# Patient Record
Sex: MALE | Race: WHITE | NOT HISPANIC OR LATINO | URBAN - METROPOLITAN AREA
[De-identification: names, ages, dates, MRNs, and addresses within clinical notes are randomized per-mention and may not be internally consistent; named-entity substitution may affect disease eponyms.]

---

## 2017-10-17 ENCOUNTER — OUTPATIENT (OUTPATIENT)
Dept: OUTPATIENT SERVICES | Facility: HOSPITAL | Age: 57
LOS: 1 days | Discharge: HOME | End: 2017-10-17

## 2017-10-17 DIAGNOSIS — R56.9 UNSPECIFIED CONVULSIONS: ICD-10-CM

## 2017-10-17 DIAGNOSIS — Z00.8 ENCOUNTER FOR OTHER GENERAL EXAMINATION: ICD-10-CM

## 2017-10-17 DIAGNOSIS — R40.4 TRANSIENT ALTERATION OF AWARENESS: ICD-10-CM

## 2017-10-18 ENCOUNTER — OUTPATIENT (OUTPATIENT)
Dept: OUTPATIENT SERVICES | Facility: HOSPITAL | Age: 57
LOS: 1 days | Discharge: HOME | End: 2017-10-18

## 2017-10-18 DIAGNOSIS — R40.4 TRANSIENT ALTERATION OF AWARENESS: ICD-10-CM

## 2017-10-18 DIAGNOSIS — R56.9 UNSPECIFIED CONVULSIONS: ICD-10-CM

## 2017-10-18 DIAGNOSIS — I49.9 CARDIAC ARRHYTHMIA, UNSPECIFIED: ICD-10-CM

## 2017-12-29 ENCOUNTER — OUTPATIENT (OUTPATIENT)
Dept: OUTPATIENT SERVICES | Facility: HOSPITAL | Age: 57
LOS: 1 days | Discharge: HOME | End: 2017-12-29

## 2017-12-29 DIAGNOSIS — S06.890S OTHER SPECIFIED INTRACRANIAL INJURY WITHOUT LOSS OF CONSCIOUSNESS, SEQUELA: ICD-10-CM

## 2018-01-30 DIAGNOSIS — R40.4 TRANSIENT ALTERATION OF AWARENESS: ICD-10-CM

## 2018-01-30 DIAGNOSIS — R56.9 UNSPECIFIED CONVULSIONS: ICD-10-CM

## 2018-07-05 ENCOUNTER — EMERGENCY (EMERGENCY)
Facility: HOSPITAL | Age: 58
LOS: 0 days | Discharge: LEFT BEFORE DISCHARGE | End: 2018-07-05
Admitting: EMERGENCY MEDICINE

## 2018-07-05 VITALS
DIASTOLIC BLOOD PRESSURE: 70 MMHG | TEMPERATURE: 98 F | SYSTOLIC BLOOD PRESSURE: 125 MMHG | RESPIRATION RATE: 20 BRPM | HEART RATE: 60 BPM | OXYGEN SATURATION: 99 %

## 2018-07-05 DIAGNOSIS — R41.0 DISORIENTATION, UNSPECIFIED: ICD-10-CM

## 2018-07-05 DIAGNOSIS — F91.8 OTHER CONDUCT DISORDERS: ICD-10-CM

## 2018-07-05 NOTE — ED ADULT TRIAGE NOTE - CHIEF COMPLAINT QUOTE
patient mechanical fall today while visiting mother as per witnesses no loc  patient confused at base line accompanied by pd. patient not under arrest aggressive on scene

## 2018-07-05 NOTE — ED ADULT NURSE NOTE - OBJECTIVE STATEMENT
pt presents to ED with c/o fall at mothers NH, no LOC. Pt awake and alert, ambulating with steady gait. Denies any pain or distress.

## 2018-07-05 NOTE — ED ADULT NURSE REASSESSMENT NOTE - NS ED NURSE REASSESS COMMENT FT1
Pt transported to CT at this time          Joleen Moctezuma, SHARATH  10/07/17 1600 pt alert and orientated x3, ambulating with steady gait. GCS 15. Pt not in assigned area, charge RN aware.

## 2018-07-05 NOTE — ED PROVIDER NOTE - PROGRESS NOTE DETAILS
searched for pt multiple times in ED for >1 hr. pt still not found in ED. pt not evaluated by me or attending. will continue to search. RN also does not know where pt is. called pts telephone without answer. left message.

## 2018-10-19 ENCOUNTER — OUTPATIENT (OUTPATIENT)
Dept: OUTPATIENT SERVICES | Facility: HOSPITAL | Age: 58
LOS: 1 days | Discharge: HOME | End: 2018-10-19

## 2018-10-19 DIAGNOSIS — Z00.8 ENCOUNTER FOR OTHER GENERAL EXAMINATION: ICD-10-CM

## 2018-10-19 LAB
ALBUMIN SERPL ELPH-MCNC: 4.6 G/DL — SIGNIFICANT CHANGE UP (ref 3.5–5.2)
ALP SERPL-CCNC: 67 U/L — SIGNIFICANT CHANGE UP (ref 30–115)
ALT FLD-CCNC: 34 U/L — SIGNIFICANT CHANGE UP (ref 0–41)
ANION GAP SERPL CALC-SCNC: 15 MMOL/L — HIGH (ref 7–14)
APPEARANCE UR: CLEAR — SIGNIFICANT CHANGE UP
AST SERPL-CCNC: 23 U/L — SIGNIFICANT CHANGE UP (ref 0–41)
BILIRUB DIRECT SERPL-MCNC: <0.2 MG/DL — SIGNIFICANT CHANGE UP (ref 0–0.2)
BILIRUB INDIRECT FLD-MCNC: >0.1 MG/DL — LOW (ref 0.2–1.2)
BILIRUB SERPL-MCNC: 0.3 MG/DL — SIGNIFICANT CHANGE UP (ref 0.2–1.2)
BILIRUB UR-MCNC: NEGATIVE — SIGNIFICANT CHANGE UP
BUN SERPL-MCNC: 13 MG/DL — SIGNIFICANT CHANGE UP (ref 10–20)
CALCIUM SERPL-MCNC: 9.4 MG/DL — SIGNIFICANT CHANGE UP (ref 8.5–10.1)
CHLORIDE SERPL-SCNC: 102 MMOL/L — SIGNIFICANT CHANGE UP (ref 98–110)
CHOLEST SERPL-MCNC: 268 MG/DL — HIGH (ref 100–200)
CO2 SERPL-SCNC: 25 MMOL/L — SIGNIFICANT CHANGE UP (ref 17–32)
COLOR SPEC: YELLOW — SIGNIFICANT CHANGE UP
CREAT SERPL-MCNC: 0.7 MG/DL — SIGNIFICANT CHANGE UP (ref 0.7–1.5)
DIFF PNL FLD: NEGATIVE — SIGNIFICANT CHANGE UP
ESTIMATED AVERAGE GLUCOSE: 148 MG/DL — HIGH (ref 68–114)
GLUCOSE SERPL-MCNC: 150 MG/DL — HIGH (ref 70–99)
GLUCOSE UR QL: NEGATIVE MG/DL — SIGNIFICANT CHANGE UP
HAV IGM SER-ACNC: SIGNIFICANT CHANGE UP
HBA1C BLD-MCNC: 6.8 % — HIGH (ref 4–5.6)
HBV CORE IGM SER-ACNC: SIGNIFICANT CHANGE UP
HBV SURFACE AG SER-ACNC: SIGNIFICANT CHANGE UP
HCT VFR BLD CALC: 41.7 % — LOW (ref 42–52)
HCV AB S/CO SERPL IA: 13.11 S/CO — SIGNIFICANT CHANGE UP
HCV AB SERPL-IMP: REACTIVE
HDLC SERPL-MCNC: 58 MG/DL — SIGNIFICANT CHANGE UP
HGB BLD-MCNC: 13.7 G/DL — LOW (ref 14–18)
KETONES UR-MCNC: NEGATIVE — SIGNIFICANT CHANGE UP
LEUKOCYTE ESTERASE UR-ACNC: NEGATIVE — SIGNIFICANT CHANGE UP
LIPID PNL WITH DIRECT LDL SERPL: 205 MG/DL — HIGH (ref 4–129)
MAGNESIUM SERPL-MCNC: 2 MG/DL — SIGNIFICANT CHANGE UP (ref 1.8–2.4)
MCHC RBC-ENTMCNC: 29.1 PG — SIGNIFICANT CHANGE UP (ref 27–31)
MCHC RBC-ENTMCNC: 32.9 G/DL — SIGNIFICANT CHANGE UP (ref 32–37)
MCV RBC AUTO: 88.5 FL — SIGNIFICANT CHANGE UP (ref 80–94)
NITRITE UR-MCNC: NEGATIVE — SIGNIFICANT CHANGE UP
NRBC # BLD: 0 /100 WBCS — SIGNIFICANT CHANGE UP (ref 0–0)
PH UR: 6 — SIGNIFICANT CHANGE UP (ref 5–8)
PLATELET # BLD AUTO: 155 K/UL — SIGNIFICANT CHANGE UP (ref 130–400)
POTASSIUM SERPL-MCNC: 4.3 MMOL/L — SIGNIFICANT CHANGE UP (ref 3.5–5)
POTASSIUM SERPL-SCNC: 4.3 MMOL/L — SIGNIFICANT CHANGE UP (ref 3.5–5)
PROT SERPL-MCNC: 7.4 G/DL — SIGNIFICANT CHANGE UP (ref 6–8)
PROT UR-MCNC: NEGATIVE MG/DL — SIGNIFICANT CHANGE UP
RBC # BLD: 4.71 M/UL — SIGNIFICANT CHANGE UP (ref 4.7–6.1)
RBC # FLD: 12.8 % — SIGNIFICANT CHANGE UP (ref 11.5–14.5)
SODIUM SERPL-SCNC: 142 MMOL/L — SIGNIFICANT CHANGE UP (ref 135–146)
SP GR SPEC: 1.01 — SIGNIFICANT CHANGE UP (ref 1.01–1.03)
TOTAL CHOLESTEROL/HDL RATIO MEASUREMENT: 4.6 RATIO — SIGNIFICANT CHANGE UP (ref 4–5.5)
TRIGL SERPL-MCNC: 93 MG/DL — SIGNIFICANT CHANGE UP (ref 10–149)
UROBILINOGEN FLD QL: 0.2 MG/DL — SIGNIFICANT CHANGE UP (ref 0.2–0.2)
WBC # BLD: 4.57 K/UL — LOW (ref 4.8–10.8)
WBC # FLD AUTO: 4.57 K/UL — LOW (ref 4.8–10.8)

## 2018-10-20 LAB
HCV RNA FLD QL NAA+PROBE: SIGNIFICANT CHANGE UP
HCV RNA SPEC QL PROBE+SIG AMP: DETECTED

## 2018-10-23 ENCOUNTER — OUTPATIENT (OUTPATIENT)
Dept: OUTPATIENT SERVICES | Facility: HOSPITAL | Age: 58
LOS: 1 days | Discharge: HOME | End: 2018-10-23

## 2018-10-23 DIAGNOSIS — I49.9 CARDIAC ARRHYTHMIA, UNSPECIFIED: ICD-10-CM

## 2018-10-23 LAB
RPR SERPL-ACNC: SIGNIFICANT CHANGE UP
T PALLIDUM AB TITR SER: POSITIVE
T PALLIDUM IGG SER QL IF: REACTIVE

## 2018-12-28 ENCOUNTER — OUTPATIENT (OUTPATIENT)
Dept: OUTPATIENT SERVICES | Facility: HOSPITAL | Age: 58
LOS: 1 days | Discharge: HOME | End: 2018-12-28

## 2018-12-28 DIAGNOSIS — S06.890S OTHER SPECIFIED INTRACRANIAL INJURY WITHOUT LOSS OF CONSCIOUSNESS, SEQUELA: ICD-10-CM

## 2019-03-19 ENCOUNTER — EMERGENCY (EMERGENCY)
Facility: HOSPITAL | Age: 59
LOS: 0 days | Discharge: HOME | End: 2019-03-20
Attending: EMERGENCY MEDICINE | Admitting: EMERGENCY MEDICINE

## 2019-03-19 VITALS — HEART RATE: 57 BPM | OXYGEN SATURATION: 98 % | TEMPERATURE: 100 F | RESPIRATION RATE: 18 BRPM

## 2019-03-19 VITALS
RESPIRATION RATE: 17 BRPM | DIASTOLIC BLOOD PRESSURE: 65 MMHG | OXYGEN SATURATION: 92 % | WEIGHT: 175.05 LBS | HEART RATE: 84 BPM | TEMPERATURE: 100 F | SYSTOLIC BLOOD PRESSURE: 113 MMHG
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
## 2019-03-19 DIAGNOSIS — G40.909 EPILEPSY, UNSPECIFIED, NOT INTRACTABLE, WITHOUT STATUS EPILEPTICUS: ICD-10-CM

## 2019-03-19 DIAGNOSIS — R56.9 UNSPECIFIED CONVULSIONS: ICD-10-CM

## 2019-03-19 DIAGNOSIS — L53.9 ERYTHEMATOUS CONDITION, UNSPECIFIED: ICD-10-CM
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
## 2019-03-19 DIAGNOSIS — R22.41 LOCALIZED SWELLING, MASS AND LUMP, RIGHT LOWER LIMB: ICD-10-CM
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
## 2019-03-19 DIAGNOSIS — F17.200 NICOTINE DEPENDENCE, UNSPECIFIED, UNCOMPLICATED: ICD-10-CM
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
## 2019-03-19 LAB
ALBUMIN SERPL ELPH-MCNC: 4.2 G/DL — SIGNIFICANT CHANGE UP (ref 3.5–5.2)
ALP SERPL-CCNC: 69 U/L — SIGNIFICANT CHANGE UP (ref 30–115)
ALT FLD-CCNC: 34 U/L — SIGNIFICANT CHANGE UP (ref 0–41)
ANION GAP SERPL CALC-SCNC: 10 MMOL/L — SIGNIFICANT CHANGE UP (ref 7–14)
APPEARANCE UR: ABNORMAL
AST SERPL-CCNC: 25 U/L — SIGNIFICANT CHANGE UP (ref 0–41)
BACTERIA # UR AUTO: ABNORMAL /HPF
BASOPHILS # BLD AUTO: 0.01 K/UL — SIGNIFICANT CHANGE UP (ref 0–0.2)
BASOPHILS NFR BLD AUTO: 0.2 % — SIGNIFICANT CHANGE UP (ref 0–1)
BILIRUB SERPL-MCNC: 0.2 MG/DL — SIGNIFICANT CHANGE UP (ref 0.2–1.2)
BILIRUB UR-MCNC: NEGATIVE — SIGNIFICANT CHANGE UP
BUN SERPL-MCNC: 14 MG/DL — SIGNIFICANT CHANGE UP (ref 10–20)
CALCIUM SERPL-MCNC: 8.9 MG/DL — SIGNIFICANT CHANGE UP (ref 8.5–10.1)
CHLORIDE SERPL-SCNC: 97 MMOL/L — LOW (ref 98–110)
CO2 SERPL-SCNC: 27 MMOL/L — SIGNIFICANT CHANGE UP (ref 17–32)
COLOR SPEC: YELLOW — SIGNIFICANT CHANGE UP
CREAT SERPL-MCNC: 0.8 MG/DL — SIGNIFICANT CHANGE UP (ref 0.7–1.5)
DIFF PNL FLD: NEGATIVE — SIGNIFICANT CHANGE UP
EOSINOPHIL # BLD AUTO: 0 K/UL — SIGNIFICANT CHANGE UP (ref 0–0.7)
EOSINOPHIL NFR BLD AUTO: 0 % — SIGNIFICANT CHANGE UP (ref 0–8)
EPI CELLS # UR: ABNORMAL /HPF
GLUCOSE SERPL-MCNC: 228 MG/DL — HIGH (ref 70–99)
GLUCOSE UR QL: 100 MG/DL
HCT VFR BLD CALC: 35.7 % — LOW (ref 42–52)
HGB BLD-MCNC: 11.9 G/DL — LOW (ref 14–18)
IMM GRANULOCYTES NFR BLD AUTO: 0.4 % — HIGH (ref 0.1–0.3)
KETONES UR-MCNC: NEGATIVE — SIGNIFICANT CHANGE UP
LEUKOCYTE ESTERASE UR-ACNC: NEGATIVE — SIGNIFICANT CHANGE UP
LYMPHOCYTES # BLD AUTO: 0.74 K/UL — LOW (ref 1.2–3.4)
LYMPHOCYTES # BLD AUTO: 14.4 % — LOW (ref 20.5–51.1)
MAGNESIUM SERPL-MCNC: 2 MG/DL — SIGNIFICANT CHANGE UP (ref 1.8–2.4)
MCHC RBC-ENTMCNC: 29 PG — SIGNIFICANT CHANGE UP (ref 27–31)
MCHC RBC-ENTMCNC: 33.3 G/DL — SIGNIFICANT CHANGE UP (ref 32–37)
MCV RBC AUTO: 87.1 FL — SIGNIFICANT CHANGE UP (ref 80–94)
MONOCYTES # BLD AUTO: 0.27 K/UL — SIGNIFICANT CHANGE UP (ref 0.1–0.6)
MONOCYTES NFR BLD AUTO: 5.3 % — SIGNIFICANT CHANGE UP (ref 1.7–9.3)
NEUTROPHILS # BLD AUTO: 4.1 K/UL — SIGNIFICANT CHANGE UP (ref 1.4–6.5)
NEUTROPHILS NFR BLD AUTO: 79.7 % — HIGH (ref 42.2–75.2)
NITRITE UR-MCNC: NEGATIVE — SIGNIFICANT CHANGE UP
NRBC # BLD: 0 /100 WBCS — SIGNIFICANT CHANGE UP (ref 0–0)
PH UR: 7 — SIGNIFICANT CHANGE UP (ref 5–8)
PHOSPHATE SERPL-MCNC: 2.4 MG/DL — SIGNIFICANT CHANGE UP (ref 2.1–4.9)
PLATELET # BLD AUTO: 127 K/UL — LOW (ref 130–400)
POTASSIUM SERPL-MCNC: 4.5 MMOL/L — SIGNIFICANT CHANGE UP (ref 3.5–5)
POTASSIUM SERPL-SCNC: 4.5 MMOL/L — SIGNIFICANT CHANGE UP (ref 3.5–5)
PROT SERPL-MCNC: 6.7 G/DL — SIGNIFICANT CHANGE UP (ref 6–8)
PROT UR-MCNC: ABNORMAL MG/DL
RBC # BLD: 4.1 M/UL — LOW (ref 4.7–6.1)
RBC # FLD: SIGNIFICANT CHANGE UP % (ref 11.5–14.5)
SODIUM SERPL-SCNC: 134 MMOL/L — LOW (ref 135–146)
SP GR SPEC: 1.01 — SIGNIFICANT CHANGE UP (ref 1.01–1.03)
TROPONIN T SERPL-MCNC: <0.01 NG/ML — SIGNIFICANT CHANGE UP
UROBILINOGEN FLD QL: 0.2 MG/DL — SIGNIFICANT CHANGE UP (ref 0.2–0.2)
WBC # BLD: 5.14 K/UL — SIGNIFICANT CHANGE UP (ref 4.8–10.8)
WBC # FLD AUTO: 5.14 K/UL — SIGNIFICANT CHANGE UP (ref 4.8–10.8)

## 2019-03-19 RX ORDER — SODIUM CHLORIDE 9 MG/ML
1000 INJECTION INTRAMUSCULAR; INTRAVENOUS; SUBCUTANEOUS ONCE
Qty: 0 | Refills: 0 | Status: COMPLETED | OUTPATIENT
Start: 2019-03-19 | End: 2019-03-19

## 2019-03-19 RX ADMIN — SODIUM CHLORIDE 1000 MILLILITER(S): 9 INJECTION INTRAMUSCULAR; INTRAVENOUS; SUBCUTANEOUS at 21:36

## 2019-03-19 RX ADMIN — SODIUM CHLORIDE 2000 MILLILITER(S): 9 INJECTION INTRAMUSCULAR; INTRAVENOUS; SUBCUTANEOUS at 19:56

## 2019-03-19 NOTE — ED ADULT NURSE NOTE - OBJECTIVE STATEMENT
Pt BIBA c/o x3 episodes of seizure activity at nursing home while visiting mother. As per EMS, 2 witnessed by the mother and 1 by the EMS. Pt administered Versed 10 mg IM in field, pt was agitated when EMS arrived, was refusing to go to the hospital. A&O, hesitant on answering questions. Pt w/ multiple belongings, given to security. Pt noted w/ b/l knee abrasions, no s/s of head trauma. Denies n/v/d, denies chills. Pt calm now in stretcher, denies SI/HI.

## 2019-03-19 NOTE — ED PROVIDER NOTE - NSFOLLOWUPINSTRUCTIONS_ED_ALL_ED_FT
Return to the ER for worsening or concerning symptoms.    See printed discharge information sheets for further instructions on care for your condition.     Seizure, Adult  ImageA seizure is a sudden burst of abnormal electrical activity in the brain. The abnormal activity temporarily interrupts normal brain function, causing a person to experience any of the following:    Involuntary movements.  Changes in awareness or consciousness.  Uncontrollable shaking (convulsions).    Seizures usually last from 30 seconds to 2 minutes. They usually do not cause permanent brain damage unless they are prolonged.    What can cause a seizure to happen?     Seizures can happen for many reasons including:    A fever.  Low blood sugar.  A medicine.  An illnesses.  A brain injury.    Some people who have a seizure never have another one. People who have repeated seizures have a condition called epilepsy.    What are the symptoms of a seizure?     Symptoms of a seizure vary greatly from person to person. They include:    Convulsions.  Stiffening of the body.  Involuntary movements of the arms or legs.  Loss of consciousness.  Breathing problems.  Falling suddenly.  Confusion.  Head nodding.  Eye blinking or fluttering.  Lip smacking.  Drooling.  Rapid eye movements.  Grunting.  Loss of bladder control and bowel control.  Staring.  Unresponsiveness.    Some people have symptoms right before a seizure happens (aura) and right after a seizure happens. Symptoms of an aura include:    Fear or anxiety.  Nausea.  Feeling like the room is spinning (vertigo).  A feeling of having seen or heard something before (vonda vu).  Odd tastes or smells.  Changes in vision, such as seeing flashing lights or spots.    Symptoms that may follow a seizure include:    Confusion.  Sleepiness.  Headache.  Weakness of one side of the body.    Follow these instructions at home:  Medicines     Image   Take over-the-counter and prescription medicines only as told by your health care provider.  Avoid any substances that may prevent your medicine from working properly, such as alcohol.  Activity     Do not drive, swim, or do any other activities that would be dangerous if you had another seizure. Wait until your health care provider approves.  If you live in the [place] check with your local [place]department of [place] to find out about the local driving laws. Each state has specific rules about when you can legally return to driving.  Get enough rest. Lack of sleep can make seizures more likely to occur.  Educating others     Teach friends and family what to do if you have a seizure. They should:    Lay you on the ground to prevent a fall.  Cushion your head and body.  Loosen any tight clothing around your neck.  Turn you on your side. If vomiting occurs, this helps keep your airway clear.  Stay with you until you recover.  Not hold you down. Holding you down will not stop the seizure.  Not put anything in your mouth.  Know whether or not you need emergency care.    General instructions     Contact your health care provider each time you have a seizure.  Avoid anything that has ever triggered a seizure for you.  Keep a seizure diary. Record what you remember about each seizure, especially anything that might have triggered the seizure.  Keep all follow-up visits as told by your health care provider. This is important.  Contact a health care provider if:  You have another seizure.  You have seizures more often.  Your seizure symptoms change.  You continue to have seizures with treatment.  You have symptoms of an infection or illness. They might increase your risk of having a seizure.  Get help right away if:  You have a seizure:    That lasts longer than 5 minutes.  That is different than previous seizures.  That leaves you unable to speak or use a part of your body.  That makes it harder to breathe.  After a head injury.    You have:    Multiple seizures in a row.  Confusion or a severe headache right after a seizure.    You are having seizures more often.  You do not wake up immediately after a seizure.  You injure yourself during a seizure.  These symptoms may represent a serious problem that is an emergency. Do not wait to see if the symptoms will go away. Get medical help right away. Call your local emergency services (911 in the [place] Do not drive yourself to the hospital.     This information is not intended to replace advice given to you by your health care provider. Make sure you discuss any questions you have with your health care provider.    Epilepsy  Epilepsy is a condition in which a person has repeated seizures over time. A seizure is a sudden burst of abnormal electrical and chemical activity in the brain. Seizures can cause a change in attention, behavior, or the ability to remain awake and alert (altered mental status).    Epilepsy increases a person's risk of falls, accidents, and injury. It can also lead to complications, including:    Depression.  Poor memory.  Sudden unexplained death in epilepsy (SUDEP). This complication is rare, and its cause is not known.    Most people with epilepsy lead normal lives.    What are the causes?  This condition may be caused by:    A head injury.  An injury that happens at birth.  A high fever during childhood.  A stroke.  Bleeding that goes into or around the brain.  Certain medicines and drugs.  Having too little oxygen for a long period of time.  Abnormal brain development.  Certain infections, such as meningitis and encephalitis.  Brain tumors.  Conditions that are passed along from parent to child (are hereditary).    What are the signs or symptoms?  Symptoms of a seizure vary greatly from person to person. They include:    Convulsions.  Stiffening of the body.  Involuntary movements of the arms or legs.  Loss of consciousness.  Breathing problems.  Falling suddenly.  Confusion.  Head nodding.  Eye blinking or fluttering.  Lip smacking.  Drooling.  Rapid eye movements.  Grunting.  Loss of bladder control and bowel control.  Staring.  Unresponsiveness.    Some people have symptoms right before a seizure happens (aura) and right after a seizure happens. Symptoms of an aura include:    Fear or anxiety.  Nausea.  Feeling like the room is spinning (vertigo).  A feeling of having seen or heard something before (vonda vu).  Odd tastes or smells.  Changes in vision, such as seeing flashing lights or spots.    Symptoms that follow a seizure include:    Confusion.  Sleepiness.  Headache.    How is this diagnosed?  This condition is diagnosed based on:    Your symptoms.  Your medical history.  A physical exam.  A neurological exam. A neurological exam is similar to a physical exam. It involves checking your strength, reflexes, coordination, and sensations.  Tests, such as:    An electroencephalogram (EEG). This is a painless test that creates a diagram of your brain waves.  An MRI of the brain.  A CT scan of the brain.  A lumbar puncture, also called a spinal tap.  Blood tests to check for signs of infection or abnormal blood chemistry.      How is this treated?  There is no cure for this condition, but treatment can help control seizures. Treatment may involve:    Taking medicines to control seizures. These include medicines to prevent seizures and medicines to stop seizures as they occur.  Having a device called a vagus nerve stimulator implanted in the chest. The device sends electrical impulses to the vagus nerve and to the brain to prevent seizures. This treatment may be recommended if medicines do not help.  Brain surgery. There are several kinds of surgeries that may be done to stop seizures from happening or to reduce how often seizures happen.  Having regular blood tests. You may need to have blood tests regularly to check that you are getting the right amount of medicine.    Once this condition has been diagnosed, it is important to begin treatment as soon as possible. For some people, epilepsy eventually goes away.    Follow these instructions at home:  Medicines     Image   Take over-the-counter and prescription medicines only as told by your health care provider.  Avoid any substances that may prevent your medicine from working properly, such as alcohol.  Activity     Get enough rest. Lack of sleep can make seizures more likely to occur.  Follow instructions from your health care provider about driving, swimming, and doing any other activities that would be dangerous if you had a seizure.  Educating others     Teach friends and family what to do if you have a seizure. They should:    Lay you on the ground to prevent a fall.  Cushion your head and body.  Loosen any tight clothing around your neck.  Turn you on your side. If vomiting occurs, this helps keep your airway clear.  Stay with you until you recover.  Not hold you down. Holding you down will not stop the seizure.  Not put anything in your mouth.  Know whether or not you need emergency care.    General instructions     Avoid anything that has ever triggered a seizure for you.  Keep a seizure diary. Record what you remember about each seizure, especially anything that might have triggered the seizure.  Keep all follow-up visits as told by your health care provider. This is important.  Contact a health care provider if:  Your seizure pattern changes.  You have symptoms of infection or another illness. This might increase your risk of having a seizure.  Get help right away if:  You have a seizure that does not stop after 5 minutes.  You have several seizures in a row without a complete recovery in between seizures.  You have a seizure that makes it harder to breathe.  You have a seizure that is different from previous seizures.  You have a seizure that leaves you unable to speak or use a part of your body.  You did not wake up immediately after a seizure.  This information is not intended to replace advice given to you by your health care provider. Make sure you discuss any questions you have with your health care provider.    Contusion  ImageA contusion is a deep bruise. Contusions are the result of a blunt injury to tissues and muscle fibers under the skin. The injury causes bleeding under the skin. The skin overlying the contusion may turn blue, purple, or yellow. Minor injuries will give you a painless contusion, but more severe contusions may stay painful and swollen for a few weeks.    What are the causes?  This condition is usually caused by a blow, trauma, or direct force to an area of the body.    What are the signs or symptoms?  Symptoms of this condition include:    Swelling of the injured area.  Pain and tenderness in the injured area.  Discoloration. The area may have redness and then turn blue, purple, or yellow.    How is this diagnosed?  This condition is diagnosed based on a physical exam and medical history. An X-ray, CT scan, or MRI may be needed to determine if there are any associated injuries, such as broken bones (fractures).    How is this treated?  Specific treatment for this condition depends on what area of the body was injured. In general, the best treatment for a contusion is resting, icing, applying pressure to (compression), and elevating the injured area. This is often called the RICE strategy. Over-the-counter anti-inflammatory medicines may also be recommended for pain control.    Follow these instructions at home:  Rest the injured area.  If directed, apply ice to the injured area:    Put ice in a plastic bag.  Place a towel between your skin and the bag.  Leave the ice on for 20 minutes, 2–3 times per day.    If directed, apply light compression to the injured area using an elastic bandage. Make sure the bandage is not wrapped too tightly. Remove and reapply the bandage as directed by your health care provider.  If possible, raise (elevate) the injured area above the level of your heart while you are sitting or lying down.  Take over-the-counter and prescription medicines only as told by your health care provider.  Contact a health care provider if:  Your symptoms do not improve after several days of treatment.  Your symptoms get worse.  You have difficulty moving the injured area.  Get help right away if:  You have severe pain.  You have numbness in a hand or foot.  Your hand or foot turns pale or cold.  This information is not intended to replace advice given to you by your health care provider. Make sure you discuss any questions you have with your health care provider.

## 2019-03-19 NOTE — CONSULT NOTE ADULT - SUBJECTIVE AND OBJECTIVE BOX
Neurology Consult    Patient is a 59y old  Male who presents with a chief complaint of SIEZURES.    HPI:· 58 yo hx of seizure BIBA 2/2 seizure. as per EMS, patient had a seizure while visiting his mother at [facility]. Patient had a seizure and woke up confused. as per EMS, patient's mother told them patient usually got aggressive and agitated after seizure and had prolong post-ictal. Patient had another seizure and witnessed by EMS and EMS gave him 10mg IM versed.    	      PAST MEDICAL & SURGICAL HISTORY:  Seizure  No significant past surgical history      FAMILY HISTORY:      Social History: (-) x 3    Allergies    No Known Allergies    Intolerances        MEDICATIONS  (STANDING):    MEDICATIONS  (PRN):      Review of systems:    Constitutional: No fever, weight loss or fatigue    Eyes: No eye pain or discharge  ENMT:  No difficulty hearing; No sinus or throat pain  Neck: No pain or stiffness  Respiratory: No cough, wheezing, chills or hemoptysis  Cardiovascular: No chest pain, palpitations, shortness of breath, dyspnea on exertion  Gastrointestinal: No abdominal pain, nausea, vomiting or hematemesis; No diarrhea or constipation.   Genitourinary: No dysuria, frequency, hematuria or incontinence  Neurological: As per HPI  Skin: No rashes or lesions   Endocrine: No heat or cold intolerance; No hair loss  Musculoskeletal: No joint pain or swelling  Psychiatric: No depression, anxiety, mood swings  Heme/Lymph: No easy bruising or bleeding gums    Vital Signs Last 24 Hrs  T(C): 37.8 (19 Mar 2019 20:15), Max: 37.9 (19 Mar 2019 19:15)  T(F): 100 (19 Mar 2019 20:15), Max: 100.2 (19 Mar 2019 19:15)  HR: 57 (19 Mar 2019 20:15) (57 - 84)  BP: 113/65 (19 Mar 2019 19:15) (113/65 - 113/65)  BP(mean): --  RR: 18 (19 Mar 2019 20:15) (17 - 18)  SpO2: 98% (19 Mar 2019 20:15) (92% - 98%)    Neurologic Examination:                    Labs:   CBC Full  -  ( 19 Mar 2019 19:40 )  WBC Count : 5.14 K/uL  Hemoglobin : 11.9 g/dL  Hematocrit : 35.7 %  Platelet Count - Automated : 127 K/uL  Mean Cell Volume : 87.1 fL  Mean Cell Hemoglobin : 29.0 pg  Mean Cell Hemoglobin Concentration : 33.3 g/dL  Auto Neutrophil # : 4.10 K/uL  Auto Lymphocyte # : 0.74 K/uL  Auto Monocyte # : 0.27 K/uL  Auto Eosinophil # : 0.00 K/uL  Auto Basophil # : 0.01 K/uL  Auto Neutrophil % : 79.7 %  Auto Lymphocyte % : 14.4 %  Auto Monocyte % : 5.3 %  Auto Eosinophil % : 0.0 %  Auto Basophil % : 0.2 %        134<L>  |  97<L>  |  14  ----------------------------<  228<H>  4.5   |  27  |  0.8    Ca    8.9      19 Mar 2019 19:40  Phos  2.4       Mg     2.0         TPro  6.7  /  Alb  4.2  /  TBili  0.2  /  DBili  x   /  AST  25  /  ALT  34  /  AlkPhos  69      LIVER FUNCTIONS - ( 19 Mar 2019 19:40 )  Alb: 4.2 g/dL / Pro: 6.7 g/dL / ALK PHOS: 69 U/L / ALT: 34 U/L / AST: 25 U/L / GGT: x             Urinalysis Basic - ( 19 Mar 2019 22:30 )    Color: Yellow / Appearance: Cloudy / S.015 / pH: x  Gluc: x / Ketone: Negative  / Bili: Negative / Urobili: 0.2 mg/dL   Blood: x / Protein: Trace mg/dL / Nitrite: Negative   Leuk Esterase: Negative / RBC: x / WBC x   Sq Epi: x / Non Sq Epi: Few /HPF / Bacteria: Few /HPF          Neuroimaging:  NCHCT: CT Head No Cont:   EXAM:  CT BRAIN            PROCEDURE DATE:  2019            INTERPRETATION:  Clinical History / Reason for exam: Seizure, fall    Technique: Multiple contiguous axial CT images of the head were obtained   from the base of the skull to the vertex without administration of   intravenous contrast.  Coronal and sagittal images were submitted.    Comparison: Noncontrast CT head dated 2017      Findings: The ventricles, basal cisterns and sulcal pattern are unchanged   when compared to previous study and again noted to be prominent   consistent with parenchymal volume loss predominantly in the frontal   lobes.     There are scattered patchy and punctate foci of hypodensities in the   bilateral frontal and parietal periventricular and subcortical white   matter which are nonspecific and without mass effect most likely   consistent with chronic small vessel ischemic changes in a patient of   this stated age.     Linear area of hypodensity is again noted in the left subinsular region   which may represent an area of encephalomalacia/gliosis from either an   old infarct or resolved hemorrhage. Left temporal encephalomalacia is   unchanged as well.     There is no acute mass effect, midline shift or hemorrhage. No   extra-axial fluid collections are identified.    Vascular calcifications are again noted in the bilateral carotid arteries   consistent with atherosclerotic changes.    The bones of the calvarium are intact. The paranasal sinuses, bilateral   mastoid complexes and globes and orbits are grossly unremarkable.    Beam hardening artifact is noted overlying the brain stem and posterior   fossa which is inherent to CT in this location.      Impression:       When compared to previous study dated 2017 there is no significant   change.     No evidence of acute intracranial hemorrhage or midline shift.                  AMY ROCHA M.D., ATTENDING RADIOLOGIST  This document has been electronically signed. Mar 19 2019 10:10PM             (19 @ 21:56)    CT Angiography/Perfusion:  MRI Brain NC:  MRA Head/Neck:  EE-19-19 @ 23:35 Neurology Consult    Patient is a 59y old  Male who presents with a chief complaint of SIEZURES.    HPI:· 58 yo hx of seizure BIBA 2/2 seizure. as per EMS, patient had a seizure while visiting his mother at [facility]. Patient had a seizure and woke up confused. as per EMS, patient's mother told them patient usually got aggressive and agitated after seizure and had prolong post-ictal. Patient had another seizure and witnessed by EMS and EMS gave him 10mg IM versed. Pt has been sleeping ever since.    Interval Hx: upon my arrival. Pt is now awake but still sleepy. He states he has had siezures in the past. He is non compliant with meds; ans pt can not recall name of his AED. Pt denies headache , nauseea/Vomiting.  	      PAST MEDICAL & SURGICAL HISTORY:  Seizure  No significant past surgical history      FAMILY HISTORY:      Social History: (-) x 3    Allergies    No Known Allergies    Intolerances        MEDICATIONS  (STANDING):    MEDICATIONS  (PRN):      Review of systems:    Constitutional: No fever, weight loss or fatigue    Eyes: No eye pain or discharge  ENMT:  No difficulty hearing; No sinus or throat pain  Neck: No pain or stiffness  Respiratory: No cough, wheezing, chills or hemoptysis  Cardiovascular: No chest pain, palpitations, shortness of breath, dyspnea on exertion  Gastrointestinal: No abdominal pain, nausea, vomiting or hematemesis; No diarrhea or constipation.   Genitourinary: No dysuria, frequency, hematuria or incontinence  Neurological: As per HPI  Skin: No rashes or lesions   Endocrine: No heat or cold intolerance; No hair loss  Musculoskeletal: No joint pain or swelling  Psychiatric: No depression, anxiety, mood swings  Heme/Lymph: No easy bruising or bleeding gums    Vital Signs Last 24 Hrs  T(C): 37.8 (19 Mar 2019 20:15), Max: 37.9 (19 Mar 2019 19:15)  T(F): 100 (19 Mar 2019 20:15), Max: 100.2 (19 Mar 2019 19:15)  HR: 57 (19 Mar 2019 20:15) (57 - 84)  BP: 113/65 (19 Mar 2019 19:15) (113/65 - 113/65)  BP(mean): --  RR: 18 (19 Mar 2019 20:15) (17 - 18)  SpO2: 98% (19 Mar 2019 20:15) (92% - 98%)    Neurologic Examination:   Drowsy but awakens to voice Or[place] to self, place, and date  PEERLA EOMI Speech intact  Now Following Commands  CUEVAS X 4 5/5 No drift  Sensory; Grossly intact to light touch                    Labs:   CBC Full  -  ( 19 Mar 2019 19:40 )  WBC Count : 5.14 K/uL  Hemoglobin : 11.9 g/dL  Hematocrit : 35.7 %  Platelet Count - Automated : 127 K/uL  Mean Cell Volume : 87.1 fL  Mean Cell Hemoglobin : 29.0 pg  Mean Cell Hemoglobin Concentration : 33.3 g/dL  Auto Neutrophil # : 4.10 K/uL  Auto Lymphocyte # : 0.74 K/uL  Auto Monocyte # : 0.27 K/uL  Auto Eosinophil # : 0.00 K/uL  Auto Basophil # : 0.01 K/uL  Auto Neutrophil % : 79.7 %  Auto Lymphocyte % : 14.4 %  Auto Monocyte % : 5.3 %  Auto Eosinophil % : 0.0 %  Auto Basophil % : 0.2 %        134<L>  |  97<L>  |  14  ----------------------------<  228<H>  4.5   |  27  |  0.8    Ca    8.9      19 Mar 2019 19:40  Phos  2.4       Mg     2.0         TPro  6.7  /  Alb  4.2  /  TBili  0.2  /  DBili  x   /  AST  25  /  ALT  34  /  AlkPhos  69      LIVER FUNCTIONS - ( 19 Mar 2019 19:40 )  Alb: 4.2 g/dL / Pro: 6.7 g/dL / ALK PHOS: 69 U/L / ALT: 34 U/L / AST: 25 U/L / GGT: x             Urinalysis Basic - ( 19 Mar 2019 22:30 )    Color: Yellow / Appearance: Cloudy / S.015 / pH: x  Gluc: x / Ketone: Negative  / Bili: Negative / Urobili: 0.2 mg/dL   Blood: x / Protein: Trace mg/dL / Nitrite: Negative   Leuk Esterase: Negative / RBC: x / WBC x   Sq Epi: x / Non Sq Epi: Few /HPF / Bacteria: Few /HPF          Neuroimaging:  NCHCT: CT Head No Cont:   EXAM:  CT BRAIN            PROCEDURE DATE:  2019            INTERPRETATION:  Clinical History / Reason for exam: Seizure, fall    Technique: Multiple contiguous axial CT images of the head were obtained   from the base of the skull to the vertex without administration of   intravenous contrast.  Coronal and sagittal images were submitted.    Comparison: Noncontrast CT head dated 2017      Findings: The ventricles, basal cisterns and sulcal pattern are unchanged   when compared to previous study and again noted to be prominent   consistent with parenchymal volume loss predominantly in the frontal   lobes.     There are scattered patchy and punctate foci of hypodensities in the   bilateral frontal and parietal periventricular and subcortical white   matter which are nonspecific and without mass effect most likely   consistent with chronic small vessel ischemic changes in a patient of   this stated age.     Linear area of hypodensity is again noted in the left subinsular region   which may represent an area of encephalomalacia/gliosis from either an   old infarct or resolved hemorrhage. Left temporal encephalomalacia is   unchanged as well.     There is no acute mass effect, midline shift or hemorrhage. No   extra-axial fluid collections are identified.    Vascular calcifications are again noted in the bilateral carotid arteries   consistent with atherosclerotic changes.    The bones of the calvarium are intact. The paranasal sinuses, bilateral   mastoid complexes and globes and orbits are grossly unremarkable.    Beam hardening artifact is noted overlying the brain stem and posterior   fossa which is inherent to CT in this location.      Impression:       When compared to previous study dated 2017 there is no significant   change.     No evidence of acute intracranial hemorrhage or midline shift.                  AMY ROCHA M.D., ATTENDING RADIOLOGIST  This document has been electronically signed. Mar 19 2019 10:10PM             (19 @ 21:56)    CT Angiography/Perfusion:  MRI Brain NC:  MRA Head/Neck:  EE-19-19 @ 23:35

## 2019-03-19 NOTE — ED PROVIDER NOTE - ATTENDING CONTRIBUTION TO CARE
I am unable to obtain a comprehensive history, review of systems, past medical history, and/or physical exam due to constraints imposed by the urgency of the patient's clinical condition and/or mental status.     59[facility] after having witnessed seizure while visiting family in [facility]. After seizure patient was acting agitated (which family reported was usual for his post-ictal). Pt had second seizure in front of EMS and received 10mg midazolam IM to break seizure. Pt unable to provide any hx due to sedation/post-ictal state.    Review of Systems  Unable to assess due to AMS/sedation/post-ictal    Physical Exam  General: Sedated but arouseable, NAD, WDWN, non-toxic appearing, NCAT, no skull/facial tender, no step-offs, no raccoon/henning, non-toxic appearing  Eyes: PERRL 2mm, EOMI, no icterus, lids and conjunctivae are normal  ENT: External inspection normal, pink/moist membranes, pharynx normal  CV: S1S2, regular rate and rhythm, no murmur/gallops/rubs, no JVD, 2+ pulses b/l, no edema/cords/homans, warm/well-perfused  Respiratory: Normal respiratory rate/effort, no respiratory distress, lungs clear to auscultation b/l, no wheezing/rales/rhonchi, no retractions, no stridor  Abdomen: Soft abdomen, no tender/distended/guarding/rebound, no CVA tender  Musculoskeletal: FROM all 4 extremities, N/V intact, pelvis stable, no TLS spinal tender/deform/step-offs, no kalpesh tender/deform  Neck: FROM neck, supple, no meningismus, trachea midline, no JVD, no cspine tender/step-offs  Integumentary: Color normal for race, warm and dry, no rash. R dorsal hand shallow abrasion (no bleeding or evidence of infection), R knee ecchymosis.  Neuro: Sedated but arouseable, CN 2-12 grossly intact, moving all 4 ext in response to command/touch, no tremors, no clonus/rigidity/hyper-reflexia.  Psych: Unable to assess    59m w seizures x2 today, now sedated/post-ictal after receiving midazolam by EMS, mild hypoxia, no resp distress. +bruising to R-hand/knee. --Labs, EKG, CT head/cspine r/o traumatic injury, observe/re-assess, d/w Neuro as needed.

## 2019-03-19 NOTE — CONSULT NOTE ADULT - ASSESSMENT
Assessment:  This is a 59y Male with h/o     Plan:   - Assessment:  This is a 59y Male with h/o epilepsy p/ w 2 siezures     Plan: No evidence of any structural abnormalities on HCT   please load with keppra 1500 mg now then 1000 q 12H        May transfer to EMU at [facility]  -

## 2019-03-19 NOTE — ED ADULT NURSE NOTE - NSIMPLEMENTINTERV_GEN_ALL_ED
Implemented All Fall Risk Interventions:  [place] to call system. Call bell, personal items and telephone within reach. Instruct patient to call for assistance. Room bathroom lighting operational. Non-slip footwear when patient is off stretcher. Physically safe environment: no spills, clutter or unnecessary equipment. Stretcher in lowest position, wheels locked, appropriate side rails in place. Provide visual cue, wrist band, yellow gown, etc. Monitor gait and stability. Monitor for mental status changes and reorient to person, place, and time. Review medications for side effects contributing to fall risk. Reinforce activity limits and safety measures with patient and family.

## 2019-03-19 NOTE — ED ADULT TRIAGE NOTE - CHIEF COMPLAINT QUOTE
Pt came c/o three episodes of seizures at nursing home while he was visiting his mother, 2 witnessed by the mother and 1 by the EMS who administered Versed 10 mg IM, pt was agitated when EMS arrived, was refusing to go to the hospital.

## 2019-03-19 NOTE — ED PROVIDER NOTE - PHYSICAL EXAMINATION
CONSTITUTIONAL: Well-appearing; well-nourished; in no apparent distress.   EYES: PERRL; EOM intact. pupils 1mm b/l  ENT: normal nose; no rhinorrhea; normal pharynx with no tonsillar hypertrophy.   CARDIOVASCULAR: Normal S1, S2; no murmurs, rubs, or gallops.   RESPIRATORY: Normal chest excursion with respiration; breath sounds clear and equal bilaterally; no wheezes, rhonchi, or rales.  GI/: Normal bowel sounds; non-distended; non-tender; no palpable organomegaly.   MS: + mild erythema to right knee with mild swelling. no deformity noted extremities. no step off the spin  SKIN: Normal for age and race; warm; dry; good turgor; no apparent lesions or exudate.   NEURO/PSYCH: alert, poor responsive. responds to painful and voice stimuli

## 2019-03-19 NOTE — ED PROVIDER NOTE - OBJECTIVE STATEMENT
60 yo hx of seizure BIBA 2/2 seizure. as per EMS, patient had a seizure while visiting his mother at [facility]. Patient had a seizure and woke up confused. as per EMS, patient's mother told them patient usually got aggressive and agitated after seizure and had prolong post-ictal. patient had another seizure and witnessed by EMS and EMS gave him 10mg IM versed.

## 2019-03-19 NOTE — ED PROVIDER NOTE - CARE PLAN
Principal Discharge DX:	Seizure  Secondary Diagnosis:	Epilepsy Principal Discharge DX:	Seizure  Secondary Diagnosis:	Epilepsy  Secondary Diagnosis:	Fall, initial encounter

## 2019-03-19 NOTE — ED PROVIDER NOTE - NSFOLLOWUPCLINICS_GEN_ALL_ED_FT
A Neurologist  Neurology  .  [place]   Phone:   Fax:   Follow Up Time: 1-3 Days    [facility]  Neurology  [address] [place] [address], [facility] [address]  [place] [address]  Phone: [phone]  Fax:   Follow Up Time: 1-3 Days

## 2019-03-19 NOTE — ED PROVIDER NOTE - CLINICAL SUMMARY MEDICAL DECISION MAKING FREE TEXT BOX
59m w seizures x2 today, now sedated/post-ictal after receiving midazolam by EMS, mild hypoxia, no resp distress. +bruising to R-hand/knee. Labs, EKG, & imaging reviewed. Pt observed in ED w frequent reassessments while on monitor. After repositioning, no further episodes of hypoxia. Pt w prolonged sedation and Neuro consulted. During Neuro consultation patient returned to baseline mental status and shortly after wishes to go home. Pt able to ambulate in ED and nonfocal neuro. Pt advised regarding symptomatic/supportive care, importance of Neuro/PMD f/u, importance of anticonvulsant compliance, and symptoms to prompt ED return. Copy of results given to patient.

## 2019-03-26 PROBLEM — R56.9 UNSPECIFIED CONVULSIONS: Chronic | Status: ACTIVE | Noted: 2019-03-19

## 2019-10-22 ENCOUNTER — OUTPATIENT (OUTPATIENT)
Dept: OUTPATIENT SERVICES | Facility: HOSPITAL | Age: 59
LOS: 1 days | Discharge: HOME | End: 2019-10-22

## 2019-10-22 DIAGNOSIS — Z00.8 ENCOUNTER FOR OTHER GENERAL EXAMINATION: ICD-10-CM

## 2019-10-22 LAB
ALBUMIN SERPL ELPH-MCNC: 4.6 G/DL — SIGNIFICANT CHANGE UP (ref 3.5–5.2)
ALP SERPL-CCNC: 125 U/L — HIGH (ref 30–115)
ALT FLD-CCNC: 25 U/L — SIGNIFICANT CHANGE UP (ref 0–41)
ANION GAP SERPL CALC-SCNC: 13 MMOL/L — SIGNIFICANT CHANGE UP (ref 7–14)
APPEARANCE UR: CLEAR — SIGNIFICANT CHANGE UP
AST SERPL-CCNC: 21 U/L — SIGNIFICANT CHANGE UP (ref 0–41)
BACTERIA # UR AUTO: ABNORMAL
BILIRUB SERPL-MCNC: 0.4 MG/DL — SIGNIFICANT CHANGE UP (ref 0.2–1.2)
BILIRUB UR-MCNC: NEGATIVE — SIGNIFICANT CHANGE UP
BUN SERPL-MCNC: 18 MG/DL — SIGNIFICANT CHANGE UP (ref 10–20)
CALCIUM SERPL-MCNC: 9.4 MG/DL — SIGNIFICANT CHANGE UP (ref 8.5–10.1)
CHLORIDE SERPL-SCNC: 103 MMOL/L — SIGNIFICANT CHANGE UP (ref 98–110)
CHOLEST SERPL-MCNC: 224 MG/DL — HIGH (ref 100–200)
CO2 SERPL-SCNC: 26 MMOL/L — SIGNIFICANT CHANGE UP (ref 17–32)
COLOR SPEC: YELLOW — SIGNIFICANT CHANGE UP
CREAT SERPL-MCNC: 0.8 MG/DL — SIGNIFICANT CHANGE UP (ref 0.7–1.5)
DIFF PNL FLD: NEGATIVE — SIGNIFICANT CHANGE UP
EPI CELLS # UR: ABNORMAL /HPF
ESTIMATED AVERAGE GLUCOSE: 128 MG/DL — HIGH (ref 68–114)
GLUCOSE SERPL-MCNC: 103 MG/DL — HIGH (ref 70–99)
GLUCOSE UR QL: NEGATIVE MG/DL — SIGNIFICANT CHANGE UP
HBA1C BLD-MCNC: 6.1 % — HIGH (ref 4–5.6)
HCT VFR BLD CALC: 42.1 % — SIGNIFICANT CHANGE UP (ref 42–52)
HDLC SERPL-MCNC: 65 MG/DL — SIGNIFICANT CHANGE UP
HGB BLD-MCNC: 13.4 G/DL — LOW (ref 14–18)
KETONES UR-MCNC: NEGATIVE — SIGNIFICANT CHANGE UP
LEUKOCYTE ESTERASE UR-ACNC: NEGATIVE — SIGNIFICANT CHANGE UP
LIPID PNL WITH DIRECT LDL SERPL: 167 MG/DL — HIGH (ref 4–129)
MAGNESIUM SERPL-MCNC: 2 MG/DL — SIGNIFICANT CHANGE UP (ref 1.8–2.4)
MCHC RBC-ENTMCNC: 28.7 PG — SIGNIFICANT CHANGE UP (ref 27–31)
MCHC RBC-ENTMCNC: 31.8 G/DL — LOW (ref 32–37)
MCV RBC AUTO: 90.1 FL — SIGNIFICANT CHANGE UP (ref 80–94)
NITRITE UR-MCNC: NEGATIVE — SIGNIFICANT CHANGE UP
NRBC # BLD: 0 /100 WBCS — SIGNIFICANT CHANGE UP (ref 0–0)
PH UR: 5.5 — SIGNIFICANT CHANGE UP (ref 5–8)
PLATELET # BLD AUTO: 187 K/UL — SIGNIFICANT CHANGE UP (ref 130–400)
POTASSIUM SERPL-MCNC: 4.9 MMOL/L — SIGNIFICANT CHANGE UP (ref 3.5–5)
POTASSIUM SERPL-SCNC: 4.9 MMOL/L — SIGNIFICANT CHANGE UP (ref 3.5–5)
PROT SERPL-MCNC: 7.7 G/DL — SIGNIFICANT CHANGE UP (ref 6–8)
PROT UR-MCNC: ABNORMAL MG/DL
RBC # BLD: 4.67 M/UL — LOW (ref 4.7–6.1)
RBC # FLD: 13 % — SIGNIFICANT CHANGE UP (ref 11.5–14.5)
SODIUM SERPL-SCNC: 142 MMOL/L — SIGNIFICANT CHANGE UP (ref 135–146)
SP GR SPEC: >=1.03 (ref 1.01–1.03)
TOTAL CHOLESTEROL/HDL RATIO MEASUREMENT: 3.4 RATIO — LOW (ref 4–5.5)
TRIGL SERPL-MCNC: 55 MG/DL — SIGNIFICANT CHANGE UP (ref 10–149)
UROBILINOGEN FLD QL: 0.2 MG/DL — SIGNIFICANT CHANGE UP (ref 0.2–0.2)
WBC # BLD: 7.18 K/UL — SIGNIFICANT CHANGE UP (ref 4.8–10.8)
WBC # FLD AUTO: 7.18 K/UL — SIGNIFICANT CHANGE UP (ref 4.8–10.8)
WBC UR QL: SIGNIFICANT CHANGE UP /HPF

## 2019-10-23 LAB
HAV IGM SER-ACNC: SIGNIFICANT CHANGE UP
HBV CORE IGM SER-ACNC: SIGNIFICANT CHANGE UP
HBV SURFACE AG SER-ACNC: SIGNIFICANT CHANGE UP
HCV AB S/CO SERPL IA: 15.32 S/CO — HIGH (ref 0–0.99)
HCV AB SERPL-IMP: REACTIVE
HCV RNA FLD QL NAA+PROBE: SIGNIFICANT CHANGE UP
HCV RNA SPEC QL PROBE+SIG AMP: DETECTED

## 2019-10-28 LAB
RPR SER-TITR: SIGNIFICANT CHANGE UP
RPR SERPL-ACNC: SIGNIFICANT CHANGE UP
T PALLIDUM AB TITR SER: POSITIVE
T PALLIDUM IGG SER QL IF: REACTIVE

## 2019-11-04 ENCOUNTER — OUTPATIENT (OUTPATIENT)
Dept: OUTPATIENT SERVICES | Facility: HOSPITAL | Age: 59
LOS: 1 days | Discharge: HOME | End: 2019-11-04

## 2019-11-04 DIAGNOSIS — I49.9 CARDIAC ARRHYTHMIA, UNSPECIFIED: ICD-10-CM

## 2019-11-13 ENCOUNTER — OUTPATIENT (OUTPATIENT)
Dept: OUTPATIENT SERVICES | Facility: HOSPITAL | Age: 59
LOS: 1 days | Discharge: HOME | End: 2019-11-13

## 2019-11-13 LAB
ALBUMIN SERPL ELPH-MCNC: 4.2 G/DL — SIGNIFICANT CHANGE UP (ref 3.5–5.2)
ALP SERPL-CCNC: 120 U/L — HIGH (ref 30–115)
ALT FLD-CCNC: 29 U/L — SIGNIFICANT CHANGE UP (ref 0–41)
ANION GAP SERPL CALC-SCNC: 11 MMOL/L — SIGNIFICANT CHANGE UP (ref 7–14)
APTT BLD: 36.1 SEC — SIGNIFICANT CHANGE UP (ref 27–39.2)
AST SERPL-CCNC: 24 U/L — SIGNIFICANT CHANGE UP (ref 0–41)
BASOPHILS # BLD AUTO: 0.02 K/UL — SIGNIFICANT CHANGE UP (ref 0–0.2)
BASOPHILS NFR BLD AUTO: 0.4 % — SIGNIFICANT CHANGE UP (ref 0–1)
BILIRUB SERPL-MCNC: 0.3 MG/DL — SIGNIFICANT CHANGE UP (ref 0.2–1.2)
BUN SERPL-MCNC: 15 MG/DL — SIGNIFICANT CHANGE UP (ref 10–20)
CALCIUM SERPL-MCNC: 9.1 MG/DL — SIGNIFICANT CHANGE UP (ref 8.5–10.1)
CHLORIDE SERPL-SCNC: 101 MMOL/L — SIGNIFICANT CHANGE UP (ref 98–110)
CO2 SERPL-SCNC: 28 MMOL/L — SIGNIFICANT CHANGE UP (ref 17–32)
CREAT SERPL-MCNC: 0.7 MG/DL — SIGNIFICANT CHANGE UP (ref 0.7–1.5)
EOSINOPHIL # BLD AUTO: 0.08 K/UL — SIGNIFICANT CHANGE UP (ref 0–0.7)
EOSINOPHIL NFR BLD AUTO: 1.5 % — SIGNIFICANT CHANGE UP (ref 0–8)
GGT SERPL-CCNC: 14 U/L — SIGNIFICANT CHANGE UP (ref 1–40)
GLUCOSE SERPL-MCNC: 128 MG/DL — HIGH (ref 70–99)
HAV IGG SER QL IA: REACTIVE
HBV SURFACE AB SER-ACNC: REACTIVE
HCT VFR BLD CALC: 40.7 % — LOW (ref 42–52)
HGB BLD-MCNC: 13.2 G/DL — LOW (ref 14–18)
HIV 1+2 AB+HIV1 P24 AG SERPL QL IA: SIGNIFICANT CHANGE UP
IMM GRANULOCYTES NFR BLD AUTO: 0.2 % — SIGNIFICANT CHANGE UP (ref 0.1–0.3)
INR BLD: 1.09 RATIO — SIGNIFICANT CHANGE UP (ref 0.65–1.3)
LDH SERPL L TO P-CCNC: 186 U/L — SIGNIFICANT CHANGE UP (ref 50–242)
LYMPHOCYTES # BLD AUTO: 1.87 K/UL — SIGNIFICANT CHANGE UP (ref 1.2–3.4)
LYMPHOCYTES # BLD AUTO: 35.6 % — SIGNIFICANT CHANGE UP (ref 20.5–51.1)
MCHC RBC-ENTMCNC: 28.9 PG — SIGNIFICANT CHANGE UP (ref 27–31)
MCHC RBC-ENTMCNC: 32.4 G/DL — SIGNIFICANT CHANGE UP (ref 32–37)
MCV RBC AUTO: 89.1 FL — SIGNIFICANT CHANGE UP (ref 80–94)
MONOCYTES # BLD AUTO: 0.36 K/UL — SIGNIFICANT CHANGE UP (ref 0.1–0.6)
MONOCYTES NFR BLD AUTO: 6.9 % — SIGNIFICANT CHANGE UP (ref 1.7–9.3)
NEUTROPHILS # BLD AUTO: 2.91 K/UL — SIGNIFICANT CHANGE UP (ref 1.4–6.5)
NEUTROPHILS NFR BLD AUTO: 55.4 % — SIGNIFICANT CHANGE UP (ref 42.2–75.2)
NRBC # BLD: 0 /100 WBCS — SIGNIFICANT CHANGE UP (ref 0–0)
PLATELET # BLD AUTO: 178 K/UL — SIGNIFICANT CHANGE UP (ref 130–400)
POTASSIUM SERPL-MCNC: 4.3 MMOL/L — SIGNIFICANT CHANGE UP (ref 3.5–5)
POTASSIUM SERPL-SCNC: 4.3 MMOL/L — SIGNIFICANT CHANGE UP (ref 3.5–5)
PROT SERPL-MCNC: 7.1 G/DL — SIGNIFICANT CHANGE UP (ref 6–8)
PROTHROM AB SERPL-ACNC: 12.5 SEC — SIGNIFICANT CHANGE UP (ref 9.95–12.87)
RBC # BLD: 4.57 M/UL — LOW (ref 4.7–6.1)
RBC # FLD: 12.9 % — SIGNIFICANT CHANGE UP (ref 11.5–14.5)
SODIUM SERPL-SCNC: 140 MMOL/L — SIGNIFICANT CHANGE UP (ref 135–146)
WBC # BLD: 5.25 K/UL — SIGNIFICANT CHANGE UP (ref 4.8–10.8)
WBC # FLD AUTO: 5.25 K/UL — SIGNIFICANT CHANGE UP (ref 4.8–10.8)

## 2019-11-14 LAB
AMPHET UR-MCNC: NEGATIVE — SIGNIFICANT CHANGE UP
BARBITURATES UR SCN-MCNC: NEGATIVE — SIGNIFICANT CHANGE UP
BENZODIAZ UR-MCNC: NEGATIVE — SIGNIFICANT CHANGE UP
COCAINE METAB.OTHER UR-MCNC: NEGATIVE — SIGNIFICANT CHANGE UP
DRUG SCREEN 1, URINE RESULT: SIGNIFICANT CHANGE UP
HCV RNA SERPL NAA DL=5-ACNC: SIGNIFICANT CHANGE UP IU/ML
HCV RNA SPEC NAA+PROBE-LOG IU: 4.88 LOGIU/ML — SIGNIFICANT CHANGE UP
METHADONE UR-MCNC: POSITIVE
OPIATES UR-MCNC: NEGATIVE — SIGNIFICANT CHANGE UP
PCP UR-MCNC: NEGATIVE — SIGNIFICANT CHANGE UP
PROPOXYPHENE QUALITATIVE URINE RESULT: NEGATIVE — SIGNIFICANT CHANGE UP
THC UR QL: NEGATIVE — SIGNIFICANT CHANGE UP

## 2019-11-15 LAB
EDDP UR QL CFM: SIGNIFICANT CHANGE UP NG/ML
EDDP, UR RESULT: SIGNIFICANT CHANGE UP NG/ML
HCV GENTYP BLD NAA+PROBE: SIGNIFICANT CHANGE UP
METHADONE IN-HOUSE INTERPRETATION: POSITIVE
METHADONE UR CFM-MCNC: POSITIVE

## 2019-11-16 LAB
A2 MACROGLOB SERPL-MCNC: 348 MG/DL — HIGH (ref 110–276)
ALT SERPL W P-5'-P-CCNC: 34 IU/L — SIGNIFICANT CHANGE UP (ref 0–55)
APO A-I SERPL-MCNC: 126 MG/DL — SIGNIFICANT CHANGE UP (ref 101–178)
BILIRUB SERPL-MCNC: 0.2 MG/DL — SIGNIFICANT CHANGE UP (ref 0–1.2)
COMMENT 2:: SIGNIFICANT CHANGE UP
FIBROSIS SCORE: 0.27 — HIGH (ref 0–0.21)
FIBROSIS SCORING:: SIGNIFICANT CHANGE UP
FIBROSIS STAGE: SIGNIFICANT CHANGE UP
GGT SERPL-CCNC: 11 IU/L — SIGNIFICANT CHANGE UP (ref 0–65)
HAPTOGLOB SERPL-MCNC: 165 MG/DL — SIGNIFICANT CHANGE UP (ref 34–200)
INTERPRETATIONS:: SIGNIFICANT CHANGE UP
LIMITATIONS:: SIGNIFICANT CHANGE UP
NECROINFLAMM ACTIVITY SCORING:: SIGNIFICANT CHANGE UP
NECROINFLAMMAT ACTIVITY GRADE: SIGNIFICANT CHANGE UP
NECROINFLAMMAT ACTIVITY SCORE: 0.17 — SIGNIFICANT CHANGE UP (ref 0–0.17)

## 2019-12-20 ENCOUNTER — OUTPATIENT (OUTPATIENT)
Dept: OUTPATIENT SERVICES | Facility: HOSPITAL | Age: 59
LOS: 1 days | Discharge: HOME | End: 2019-12-20

## 2019-12-20 DIAGNOSIS — S06.890S OTHER SPECIFIED INTRACRANIAL INJURY WITHOUT LOSS OF CONSCIOUSNESS, SEQUELA: ICD-10-CM

## 2020-01-18 ENCOUNTER — EMERGENCY (EMERGENCY)
Facility: HOSPITAL | Age: 60
LOS: 0 days | Discharge: HOME | End: 2020-01-18
Attending: EMERGENCY MEDICINE | Admitting: EMERGENCY MEDICINE
Payer: MEDICARE

## 2020-01-18 VITALS
HEART RATE: 71 BPM | HEIGHT: 66 IN | OXYGEN SATURATION: 99 % | DIASTOLIC BLOOD PRESSURE: 86 MMHG | TEMPERATURE: 98 F | SYSTOLIC BLOOD PRESSURE: 178 MMHG | RESPIRATION RATE: 18 BRPM | WEIGHT: 132.94 LBS

## 2020-01-18 VITALS
RESPIRATION RATE: 16 BRPM | TEMPERATURE: 98 F | OXYGEN SATURATION: 98 % | HEART RATE: 80 BPM | SYSTOLIC BLOOD PRESSURE: 206 MMHG | DIASTOLIC BLOOD PRESSURE: 98 MMHG | HEIGHT: 66 IN

## 2020-01-18 DIAGNOSIS — M25.511 PAIN IN RIGHT SHOULDER: ICD-10-CM

## 2020-01-18 DIAGNOSIS — M25.519 PAIN IN UNSPECIFIED SHOULDER: ICD-10-CM

## 2020-01-18 PROCEDURE — 99284 EMERGENCY DEPT VISIT MOD MDM: CPT

## 2020-01-18 PROCEDURE — 73030 X-RAY EXAM OF SHOULDER: CPT | Mod: 26,RT

## 2020-01-18 PROCEDURE — 99283 EMERGENCY DEPT VISIT LOW MDM: CPT

## 2020-01-18 RX ORDER — KETOROLAC TROMETHAMINE 30 MG/ML
30 SYRINGE (ML) INJECTION ONCE
Refills: 0 | Status: DISCONTINUED | OUTPATIENT
Start: 2020-01-18 | End: 2020-01-18

## 2020-01-18 RX ORDER — METHOCARBAMOL 500 MG/1
1000 TABLET, FILM COATED ORAL ONCE
Refills: 0 | Status: COMPLETED | OUTPATIENT
Start: 2020-01-18 | End: 2020-01-18

## 2020-01-18 RX ORDER — LIDOCAINE 4 G/100G
1 CREAM TOPICAL
Qty: 1 | Refills: 0
Start: 2020-01-18 | End: 2020-01-22

## 2020-01-18 RX ORDER — IBUPROFEN 200 MG
600 TABLET ORAL ONCE
Refills: 0 | Status: COMPLETED | OUTPATIENT
Start: 2020-01-18 | End: 2020-01-18

## 2020-01-18 RX ORDER — METHOCARBAMOL 500 MG/1
2 TABLET, FILM COATED ORAL
Qty: 20 | Refills: 0
Start: 2020-01-18 | End: 2020-01-22

## 2020-01-18 RX ADMIN — Medication 600 MILLIGRAM(S): at 13:39

## 2020-01-18 RX ADMIN — METHOCARBAMOL 1000 MILLIGRAM(S): 500 TABLET, FILM COATED ORAL at 18:23

## 2020-01-18 RX ADMIN — Medication 30 MILLIGRAM(S): at 18:23

## 2020-01-18 NOTE — ED ADULT NURSE NOTE - OBJECTIVE STATEMENT
Pt presented with c/o right shoulder pain. Denies trauma to area. [facility] for same complaints, left because "he was agitated." Alert and oriented. No obvious trauma or deformities noted.

## 2020-01-18 NOTE — ED PROVIDER NOTE - PHYSICAL EXAMINATION
Physical Exam    Vital Signs: I have reviewed the initial vital signs  Constitutional: well-nourished, appears stated age, in mild distress  EENT: Conjunctiva pink, Sclera clear, PERRLA, EOMI. Mucous membranes moist, no exudates or lesions noted, uvula midline.  Cardiovascular: S1 and S2 present, regular rate, regular rhythm.  Respiratory: unlabored respiratory effort, clear to auscultation bilaterally no wheezing, rales or rhonchi  Musculoskeletal: supple nontender neck, no midline tenderness. Right Shoulder: radial pulse 2 +, sensation intact throughout rue. from in elbow, nontender. TTP in anterior shoulder with 3 degrees of abduction, patient guarded otherwise. No clavicular deformity, pain or scapular pain.  Integumentary: warm, dry, no rash  Psychiatric: appropriate mood, appropriate affect

## 2020-01-18 NOTE — ED PROVIDER NOTE - NSFOLLOWUPINSTRUCTIONS_ED_ALL_ED_FT
Shoulder Pain  Many things can cause shoulder pain, including:  An injury.Moving the shoulder in the same way again and again (overuse).Joint pain (arthritis).Pain can come from:  Swelling and irritation (inflammation) of any part of the shoulder.An injury to the shoulder joint.An injury to:  Tissues that connect muscle to bone (tendons).Tissues that connect bones to each other (ligaments).Bones.Follow these instructions at home:  Watch for changes in your symptoms. Let your doctor know about them. Follow these instructions to help with your pain.  If you have a sling:     Wear the sling as told by your doctor. Remove it only as told by your doctor.Loosen the sling if your fingers:  Tingle. Become numb. Turn cold and blue. Keep the sling clean.If the sling is not waterproof:  Do not let it get wet.Take the sling off when you shower or bathe.Managing pain, stiffness, and swelling        If told, put ice on the painful area:  Put ice in a plastic bag. Place a towel between your skin and the bag. Leave the ice on for 20 minutes, 2–3 times a day. Stop putting ice on if it does not help with the pain.Squeeze a soft ball or a foam pad as much as possible. This prevents swelling in the shoulder. It also helps to strengthen the arm.General instructions     Take over-the-counter and prescription medicines only as told by your doctor.Keep all follow-up visits as told by your doctor. This is important.Contact a doctor if:  Your pain gets worse.Medicine does not help your pain.You have new pain in your arm, hand, or fingers.Get help right away if:  Your arm, hand, or fingers:  Tingle.Are numb.Are swollen.Are painful.Turn white or blue.Summary  Shoulder pain can be caused by many things. These include injury, moving the shoulder in the same away again and again, and joint pain.Watch for changes in your symptoms. Let your doctor know about them.This condition may be treated with a sling, ice, and pain medicine.Contact your doctor if the pain gets worse or you have new pain. Get help right away if your arm, hand, or fingers tingle or get numb, swollen, or painful.Keep all follow-up visits as told by your doctor. This is important.This information is not intended to replace advice given to you by your health care provider. Make sure you discuss any questions you have with your health care provider.

## 2020-01-18 NOTE — ED PROVIDER NOTE - ATTENDING CONTRIBUTION TO CARE
58 yo M with pmh of tbi, seizures, on methadone, presents with rt shoulder pain.  pt was at [facility] ed earlier today with same symtpoms, but as per aide became very agitated due to the wait, so decided to go to [facility] ed.  aide denies trauma.  pt is able to range shoulder.  pt is otherwise poor historian.  exam: nad, ncat, perrl, eomi, mmm, rrr, ctab, abd soft, nt,nd aox3, ttp rt shoulder, from, radial pulse normal, no arm swelling, no skin changes imp: pt with left shoulder pain, will xray, symtpomatic treatment.

## 2020-01-18 NOTE — ED PROVIDER NOTE - PATIENT PORTAL LINK FT
You can access the FollowMyHealth Patient Portal offered by [facility] by registering at the following website: [email or web address]. By joining [employer]’s FollowMyHealth portal, you will also be able to view your health information using other applications (apps) compatible with our system.

## 2020-01-18 NOTE — ED PROVIDER NOTE - OBJECTIVE STATEMENT
60 yo M with hx of TBI, seizures, chronic pain on methadone, anxiety, presents for evaluation of right shoulder pain. No fevers, chills, skin changes, shortness of breath, chest pain, numbness, paresthesias Pain anterior shoulder, moderate, worse with palpation/movement, worsening. Pain began this morning and was atraumatic. [facility] but left because he was getting agitated

## 2020-01-18 NOTE — ED PROVIDER NOTE - CLINICAL SUMMARY MEDICAL DECISION MAKING FREE TEXT BOX
Pt with atraumatic R shoulder pain, tender, but FROM, xray unremarkable, will place in sling and symptomatic treatment.  f/u ortho outpt

## 2020-01-18 NOTE — ED PROVIDER NOTE - NSFOLLOWUPCLINICS_GEN_ALL_ED_FT
[facility]  Medicine  [address], [place]   Phone: [phone]  Fax:   Follow Up Time: 1-3 Days    [facility]  Orthpedic  [address], [place]   Phone: [phone]  Fax:   Follow Up Time: 4-6 Days

## 2020-01-18 NOTE — ED PROVIDER NOTE - PHYSICAL EXAMINATION
Vital Signs: I have reviewed the initial vital signs  Constitutional: well-nourished, appears stated age, in mild distress  EENT: Conjunctiva pink, Sclera clear, PERRLA, EOMI. Mucous membranes moist, no exudates or lesions noted, uvula midline.  Cardiovascular: S1 and S2 present, regular rate, regular rhythm.  Respiratory: unlabored respiratory effort, clear to auscultation bilaterally no wheezing, rales or rhonchi  Musculoskeletal: supple nontender neck, no midline tenderness. Right Shoulder: radial pulse 2 +, sensation intact throughout rue. from in elbow, nontender. TTP in anterior shoulder with 3 degrees of abduction, patient guarded otherwise. No clavicular deformity, pain or scapular pain.  Integumentary: warm, dry, no rash  Psychiatric: appropriate mood, appropriate affect Vital Signs: I have reviewed the initial vital signs  Constitutional: well-nourished, appears stated age, in mild distress  EENT: Conjunctiva pink, Sclera clear, PERRLA, EOMI. Mucous membranes moist, no exudates or lesions noted, uvula midline.  Cardiovascular: S1 and S2 present, regular rate, regular rhythm.  Respiratory: unlabored respiratory effort, clear to auscultation bilaterally no wheezing, rales or rhonchi  Musculoskeletal: supple nontender neck, no midline tenderness. Right Shoulder: radial pulse 2 +, sensation intact throughout rue. from in elbow, nontender. TTP in anterior shoulder with 3 degrees of abduction, patient guarded otherwise, pt able to touch opposite left shoulder with right hand. No clavicular deformity, pain or scapular pain.  Integumentary: warm, dry, no rash  Psychiatric: appropriate mood, appropriate affect

## 2020-01-18 NOTE — ED PROVIDER NOTE - NS ED ROS FT
Review of Systems         Constitutional: (-) fever (-) chills (-) weakness       Head: (-) trauma       EENT: (-) sore throat (-) congestion       Cardiovascular: (-) chest pain (-) syncope       Respiratory: (-) cough, (-) shortness of breath       Gastrointestinal: (-) abdominal pain (-) vomiting (-) diarrhea (-) nausea (-) constipation       Musculoskeletal: (-) neck pain (-) back pain (+) joint pain       Integumentary: (-) rash       Neurological: (-) paresthesias       Psych: (-) psych history

## 2020-01-18 NOTE — ED PROVIDER NOTE - PROGRESS NOTE DETAILS
pt given a sling, feeling better after medication Discussed labs and imaging. Discussed need to follow up PMD. Discussed signs and symptoms to return to the ED for. Pt understands and agrees with discharge plan

## 2020-01-18 NOTE — ED PROVIDER NOTE - OBJECTIVE STATEMENT
59 year old male hx TBI, chronic pain on methadone, anxiety presenting with right shoulder pain. Pain ant shoulder, moderate, worse with palpation/movement, no palliation, worsening. Pain began this morning and was atraumatic. No fevers, chills, skin changes, shortness of breath, chest pain, numbness, paresthesias.

## 2020-01-19 PROBLEM — F41.9 ANXIETY DISORDER, UNSPECIFIED: Chronic | Status: ACTIVE | Noted: 2020-01-18

## 2020-01-19 PROBLEM — I67.1 CEREBRAL ANEURYSM, NONRUPTURED: Chronic | Status: ACTIVE | Noted: 2020-01-18

## 2020-03-25 ENCOUNTER — APPOINTMENT (OUTPATIENT)
Dept: NEUROLOGY | Facility: CLINIC | Age: 60
End: 2020-03-25

## 2020-05-01 ENCOUNTER — OUTPATIENT (OUTPATIENT)
Dept: OUTPATIENT SERVICES | Facility: HOSPITAL | Age: 60
LOS: 1 days | Discharge: HOME | End: 2020-05-01

## 2020-05-01 DIAGNOSIS — S06.890S OTHER SPECIFIED INTRACRANIAL INJURY WITHOUT LOSS OF CONSCIOUSNESS, SEQUELA: ICD-10-CM

## 2020-05-08 ENCOUNTER — APPOINTMENT (OUTPATIENT)
Dept: NEUROLOGY | Facility: CLINIC | Age: 60
End: 2020-05-08
Payer: MEDICARE

## 2020-05-08 PROCEDURE — 99204 OFFICE O/P NEW MOD 45 MIN: CPT | Mod: 95

## 2020-05-08 NOTE — ASSESSMENT
[FreeTextEntry1] : 61 Y/O Rt. handed with history of left sided aneurysm rupture and SAH and left partial epilepsy\par transferring his care from [place] [facility]\par No levels recently \par Will do EEG \par Will do levels\par F/U\par will consider prolong EEG

## 2020-05-08 NOTE — REASON FOR VISIT
[Home] : at home, [unfilled] , at the time of the visit. [Medical Office: ([facility])___] : at the medical office located in

## 2020-05-08 NOTE — PHYSICAL EXAM
[FreeTextEntry1] : A/A/Oxperson  and place\par good attention and concentration\par humerus\par + aphasia\par + paraphasic errors\par +anomia \par + verbal perseveration\par + right sided fixation and hemiparesis and slight right elbow contracture\par + mild right paretic gait
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
## 2020-05-14 ENCOUNTER — APPOINTMENT (OUTPATIENT)
Dept: INTERNAL MEDICINE | Facility: CLINIC | Age: 60
End: 2020-05-14

## 2020-07-06 ENCOUNTER — INPATIENT (INPATIENT)
Facility: HOSPITAL | Age: 60
LOS: 6 days | Discharge: NURSING, REHAB OR LONG-TERM CARE | End: 2020-07-13
Attending: HOSPITALIST | Admitting: HOSPITALIST
Payer: MEDICARE

## 2020-07-06 VITALS
DIASTOLIC BLOOD PRESSURE: 85 MMHG | OXYGEN SATURATION: 100 % | HEART RATE: 88 BPM | RESPIRATION RATE: 18 BRPM | SYSTOLIC BLOOD PRESSURE: 144 MMHG | WEIGHT: 179.9 LBS | TEMPERATURE: 97 F

## 2020-07-06 DIAGNOSIS — R40.2341 COMA SCALE, BEST MOTOR RESPONSE, FLEXION WITHDRAWAL, IN THE FIELD [EMT OR AMBULANCE]: ICD-10-CM

## 2020-07-06 DIAGNOSIS — R40.2241 COMA SCALE, BEST VERBAL RESPONSE, CONFUSED CONVERSATION, IN THE FIELD [EMT OR AMBULANCE]: ICD-10-CM

## 2020-07-06 DIAGNOSIS — R40.2143 COMA SCALE, EYES OPEN, SPONTANEOUS, AT HOSPITAL ADMISSION: ICD-10-CM

## 2020-07-06 DIAGNOSIS — R40.2131: ICD-10-CM

## 2020-07-06 LAB
ALBUMIN SERPL ELPH-MCNC: 4.6 G/DL — SIGNIFICANT CHANGE UP (ref 3.5–5.2)
ALP SERPL-CCNC: 65 U/L — SIGNIFICANT CHANGE UP (ref 30–115)
ALT FLD-CCNC: 109 U/L — HIGH (ref 0–41)
ANION GAP SERPL CALC-SCNC: 13 MMOL/L — SIGNIFICANT CHANGE UP (ref 7–14)
ANION GAP SERPL CALC-SCNC: 18 MMOL/L — HIGH (ref 7–14)
APTT BLD: 26.2 SEC — LOW (ref 27–39.2)
AST SERPL-CCNC: 92 U/L — HIGH (ref 0–41)
BASE EXCESS BLDV CALC-SCNC: 0.4 MMOL/L — SIGNIFICANT CHANGE UP (ref -2–2)
BASOPHILS # BLD AUTO: 0.01 K/UL — SIGNIFICANT CHANGE UP (ref 0–0.2)
BASOPHILS # BLD AUTO: 0.02 K/UL — SIGNIFICANT CHANGE UP (ref 0–0.2)
BASOPHILS NFR BLD AUTO: 0.2 % — SIGNIFICANT CHANGE UP (ref 0–1)
BASOPHILS NFR BLD AUTO: 0.2 % — SIGNIFICANT CHANGE UP (ref 0–1)
BILIRUB SERPL-MCNC: 0.6 MG/DL — SIGNIFICANT CHANGE UP (ref 0.2–1.2)
BLD GP AB SCN SERPL QL: SIGNIFICANT CHANGE UP
BUN SERPL-MCNC: 12 MG/DL — SIGNIFICANT CHANGE UP (ref 10–20)
BUN SERPL-MCNC: 24 MG/DL — HIGH (ref 10–20)
CA-I SERPL-SCNC: 1.15 MMOL/L — SIGNIFICANT CHANGE UP (ref 1.12–1.3)
CALCIUM SERPL-MCNC: 8.8 MG/DL — SIGNIFICANT CHANGE UP (ref 8.5–10.1)
CALCIUM SERPL-MCNC: 9.5 MG/DL — SIGNIFICANT CHANGE UP (ref 8.5–10.1)
CHLORIDE SERPL-SCNC: 102 MMOL/L — SIGNIFICANT CHANGE UP (ref 98–110)
CHLORIDE SERPL-SCNC: 97 MMOL/L — LOW (ref 98–110)
CK SERPL-CCNC: 2335 U/L — HIGH (ref 0–225)
CK SERPL-CCNC: 7032 U/L — HIGH (ref 0–225)
CO2 SERPL-SCNC: 22 MMOL/L — SIGNIFICANT CHANGE UP (ref 17–32)
CO2 SERPL-SCNC: 26 MMOL/L — SIGNIFICANT CHANGE UP (ref 17–32)
CREAT SERPL-MCNC: 0.7 MG/DL — SIGNIFICANT CHANGE UP (ref 0.7–1.5)
CREAT SERPL-MCNC: 1 MG/DL — SIGNIFICANT CHANGE UP (ref 0.7–1.5)
EOSINOPHIL # BLD AUTO: 0 K/UL — SIGNIFICANT CHANGE UP (ref 0–0.7)
EOSINOPHIL # BLD AUTO: 0 K/UL — SIGNIFICANT CHANGE UP (ref 0–0.7)
EOSINOPHIL NFR BLD AUTO: 0 % — SIGNIFICANT CHANGE UP (ref 0–8)
EOSINOPHIL NFR BLD AUTO: 0 % — SIGNIFICANT CHANGE UP (ref 0–8)
ETHANOL SERPL-MCNC: <10 MG/DL — SIGNIFICANT CHANGE UP
GAS PNL BLDV: 141 MMOL/L — SIGNIFICANT CHANGE UP (ref 136–145)
GAS PNL BLDV: SIGNIFICANT CHANGE UP
GAS PNL BLDV: SIGNIFICANT CHANGE UP
GLUCOSE SERPL-MCNC: 129 MG/DL — HIGH (ref 70–99)
GLUCOSE SERPL-MCNC: 224 MG/DL — HIGH (ref 70–99)
HCO3 BLDV-SCNC: 24 MMOL/L — SIGNIFICANT CHANGE UP (ref 22–29)
HCT VFR BLD CALC: 40.4 % — LOW (ref 42–52)
HCT VFR BLD CALC: 41.2 % — LOW (ref 42–52)
HCT VFR BLDA CALC: 44 % — SIGNIFICANT CHANGE UP (ref 34–44)
HGB BLD CALC-MCNC: 14.4 G/DL — SIGNIFICANT CHANGE UP (ref 14–18)
HGB BLD-MCNC: 13.5 G/DL — LOW (ref 14–18)
HGB BLD-MCNC: 13.9 G/DL — LOW (ref 14–18)
IMM GRANULOCYTES NFR BLD AUTO: 0.3 % — SIGNIFICANT CHANGE UP (ref 0.1–0.3)
IMM GRANULOCYTES NFR BLD AUTO: 0.7 % — HIGH (ref 0.1–0.3)
INR BLD: 1.16 RATIO — SIGNIFICANT CHANGE UP (ref 0.65–1.3)
LACTATE BLDV-MCNC: 4 MMOL/L — HIGH (ref 0.5–1.6)
LACTATE SERPL-SCNC: 4.5 MMOL/L — CRITICAL HIGH (ref 0.7–2)
LYMPHOCYTES # BLD AUTO: 0.72 K/UL — LOW (ref 1.2–3.4)
LYMPHOCYTES # BLD AUTO: 1.8 K/UL — SIGNIFICANT CHANGE UP (ref 1.2–3.4)
LYMPHOCYTES # BLD AUTO: 27.7 % — SIGNIFICANT CHANGE UP (ref 20.5–51.1)
LYMPHOCYTES # BLD AUTO: 5.9 % — LOW (ref 20.5–51.1)
MAGNESIUM SERPL-MCNC: 2.1 MG/DL — SIGNIFICANT CHANGE UP (ref 1.8–2.4)
MCHC RBC-ENTMCNC: 29.8 PG — SIGNIFICANT CHANGE UP (ref 27–31)
MCHC RBC-ENTMCNC: 30.5 PG — SIGNIFICANT CHANGE UP (ref 27–31)
MCHC RBC-ENTMCNC: 33.4 G/DL — SIGNIFICANT CHANGE UP (ref 32–37)
MCHC RBC-ENTMCNC: 33.7 G/DL — SIGNIFICANT CHANGE UP (ref 32–37)
MCV RBC AUTO: 89.2 FL — SIGNIFICANT CHANGE UP (ref 80–94)
MCV RBC AUTO: 90.5 FL — SIGNIFICANT CHANGE UP (ref 80–94)
MONOCYTES # BLD AUTO: 0.61 K/UL — HIGH (ref 0.1–0.6)
MONOCYTES # BLD AUTO: 0.68 K/UL — HIGH (ref 0.1–0.6)
MONOCYTES NFR BLD AUTO: 10.5 % — HIGH (ref 1.7–9.3)
MONOCYTES NFR BLD AUTO: 5 % — SIGNIFICANT CHANGE UP (ref 1.7–9.3)
MYOGLOBIN SERPL-MCNC: 1686 NG/ML — HIGH (ref 0–70)
MYOGLOBIN SERPL-MCNC: 1779 NG/ML — HIGH (ref 0–70)
MYOGLOBIN SERPL-MCNC: 8762 NG/ML — HIGH (ref 0–70)
NEUTROPHILS # BLD AUTO: 10.79 K/UL — HIGH (ref 1.4–6.5)
NEUTROPHILS # BLD AUTO: 3.98 K/UL — SIGNIFICANT CHANGE UP (ref 1.4–6.5)
NEUTROPHILS NFR BLD AUTO: 61.3 % — SIGNIFICANT CHANGE UP (ref 42.2–75.2)
NEUTROPHILS NFR BLD AUTO: 88.2 % — HIGH (ref 42.2–75.2)
NRBC # BLD: 0 /100 WBCS — SIGNIFICANT CHANGE UP (ref 0–0)
NRBC # BLD: 0 /100 WBCS — SIGNIFICANT CHANGE UP (ref 0–0)
PCO2 BLDV: 35 MMHG — LOW (ref 41–51)
PH BLDV: 7.44 — HIGH (ref 7.26–7.43)
PHOSPHATE SERPL-MCNC: 2.8 MG/DL — SIGNIFICANT CHANGE UP (ref 2.1–4.9)
PLATELET # BLD AUTO: 101 K/UL — LOW (ref 130–400)
PLATELET # BLD AUTO: 188 K/UL — SIGNIFICANT CHANGE UP (ref 130–400)
PO2 BLDV: 45 MMHG — HIGH (ref 20–40)
POTASSIUM BLDV-SCNC: 4.2 MMOL/L — SIGNIFICANT CHANGE UP (ref 3.3–5.6)
POTASSIUM SERPL-MCNC: 3.9 MMOL/L — SIGNIFICANT CHANGE UP (ref 3.5–5)
POTASSIUM SERPL-MCNC: 4.5 MMOL/L — SIGNIFICANT CHANGE UP (ref 3.5–5)
POTASSIUM SERPL-SCNC: 3.9 MMOL/L — SIGNIFICANT CHANGE UP (ref 3.5–5)
POTASSIUM SERPL-SCNC: 4.5 MMOL/L — SIGNIFICANT CHANGE UP (ref 3.5–5)
PROT SERPL-MCNC: 7.7 G/DL — SIGNIFICANT CHANGE UP (ref 6–8)
PROTHROM AB SERPL-ACNC: 13.3 SEC — HIGH (ref 9.95–12.87)
RBC # BLD: 4.53 M/UL — LOW (ref 4.7–6.1)
RBC # BLD: 4.55 M/UL — LOW (ref 4.7–6.1)
RBC # FLD: 13 % — SIGNIFICANT CHANGE UP (ref 11.5–14.5)
RBC # FLD: 13 % — SIGNIFICANT CHANGE UP (ref 11.5–14.5)
SAO2 % BLDV: 83 % — SIGNIFICANT CHANGE UP
SODIUM SERPL-SCNC: 137 MMOL/L — SIGNIFICANT CHANGE UP (ref 135–146)
SODIUM SERPL-SCNC: 141 MMOL/L — SIGNIFICANT CHANGE UP (ref 135–146)
TROPONIN T SERPL-MCNC: <0.01 NG/ML — SIGNIFICANT CHANGE UP
WBC # BLD: 12.23 K/UL — HIGH (ref 4.8–10.8)
WBC # BLD: 6.49 K/UL — SIGNIFICANT CHANGE UP (ref 4.8–10.8)
WBC # FLD AUTO: 12.23 K/UL — HIGH (ref 4.8–10.8)
WBC # FLD AUTO: 6.49 K/UL — SIGNIFICANT CHANGE UP (ref 4.8–10.8)

## 2020-07-06 PROCEDURE — 70496 CT ANGIOGRAPHY HEAD: CPT | Mod: 26

## 2020-07-06 PROCEDURE — 93010 ELECTROCARDIOGRAM REPORT: CPT

## 2020-07-06 PROCEDURE — 70498 CT ANGIOGRAPHY NECK: CPT | Mod: 26

## 2020-07-06 PROCEDURE — 74177 CT ABD & PELVIS W/CONTRAST: CPT | Mod: 26

## 2020-07-06 PROCEDURE — 71260 CT THORAX DX C+: CPT | Mod: 26

## 2020-07-06 PROCEDURE — 99291 CRITICAL CARE FIRST HOUR: CPT | Mod: 25,GC

## 2020-07-06 PROCEDURE — 71045 X-RAY EXAM CHEST 1 VIEW: CPT | Mod: 26

## 2020-07-06 PROCEDURE — 12011 RPR F/E/E/N/L/M 2.5 CM/<: CPT | Mod: GC

## 2020-07-06 PROCEDURE — 72125 CT NECK SPINE W/O DYE: CPT | Mod: 26

## 2020-07-06 PROCEDURE — 99223 1ST HOSP IP/OBS HIGH 75: CPT

## 2020-07-06 PROCEDURE — 99283 EMERGENCY DEPT VISIT LOW MDM: CPT

## 2020-07-06 PROCEDURE — 70450 CT HEAD/BRAIN W/O DYE: CPT | Mod: 26,59

## 2020-07-06 PROCEDURE — 73130 X-RAY EXAM OF HAND: CPT | Mod: 26,RT

## 2020-07-06 PROCEDURE — 72170 X-RAY EXAM OF PELVIS: CPT | Mod: 26

## 2020-07-06 PROCEDURE — 70486 CT MAXILLOFACIAL W/O DYE: CPT | Mod: 26

## 2020-07-06 RX ORDER — MORPHINE SULFATE 50 MG/1
2 CAPSULE, EXTENDED RELEASE ORAL EVERY 6 HOURS
Refills: 0 | Status: DISCONTINUED | OUTPATIENT
Start: 2020-07-06 | End: 2020-07-10

## 2020-07-06 RX ORDER — PANTOPRAZOLE SODIUM 20 MG/1
40 TABLET, DELAYED RELEASE ORAL
Refills: 0 | Status: DISCONTINUED | OUTPATIENT
Start: 2020-07-06 | End: 2020-07-13

## 2020-07-06 RX ORDER — MORPHINE SULFATE 50 MG/1
4 CAPSULE, EXTENDED RELEASE ORAL ONCE
Refills: 0 | Status: DISCONTINUED | OUTPATIENT
Start: 2020-07-06 | End: 2020-07-06

## 2020-07-06 RX ORDER — ACETAMINOPHEN 500 MG
650 TABLET ORAL ONCE
Refills: 0 | Status: COMPLETED | OUTPATIENT
Start: 2020-07-06 | End: 2020-07-06

## 2020-07-06 RX ORDER — LEVETIRACETAM 250 MG/1
1000 TABLET, FILM COATED ORAL ONCE
Refills: 0 | Status: COMPLETED | OUTPATIENT
Start: 2020-07-06 | End: 2020-07-06

## 2020-07-06 RX ORDER — SENNA PLUS 8.6 MG/1
2 TABLET ORAL AT BEDTIME
Refills: 0 | Status: DISCONTINUED | OUTPATIENT
Start: 2020-07-06 | End: 2020-07-13

## 2020-07-06 RX ORDER — LEVETIRACETAM 250 MG/1
500 TABLET, FILM COATED ORAL
Refills: 0 | Status: DISCONTINUED | OUTPATIENT
Start: 2020-07-06 | End: 2020-07-13

## 2020-07-06 RX ORDER — SODIUM CHLORIDE 9 MG/ML
1000 INJECTION INTRAMUSCULAR; INTRAVENOUS; SUBCUTANEOUS
Refills: 0 | Status: DISCONTINUED | OUTPATIENT
Start: 2020-07-06 | End: 2020-07-07

## 2020-07-06 RX ORDER — CEFEPIME 1 G/1
2000 INJECTION, POWDER, FOR SOLUTION INTRAMUSCULAR; INTRAVENOUS ONCE
Refills: 0 | Status: COMPLETED | OUTPATIENT
Start: 2020-07-06 | End: 2020-07-06

## 2020-07-06 RX ORDER — RISPERIDONE 4 MG/1
0.5 TABLET ORAL
Refills: 0 | Status: DISCONTINUED | OUTPATIENT
Start: 2020-07-06 | End: 2020-07-13

## 2020-07-06 RX ORDER — SODIUM CHLORIDE 9 MG/ML
2000 INJECTION, SOLUTION INTRAVENOUS ONCE
Refills: 0 | Status: COMPLETED | OUTPATIENT
Start: 2020-07-06 | End: 2020-07-06

## 2020-07-06 RX ORDER — SODIUM CHLORIDE 9 MG/ML
1000 INJECTION, SOLUTION INTRAVENOUS ONCE
Refills: 0 | Status: COMPLETED | OUTPATIENT
Start: 2020-07-06 | End: 2020-07-06

## 2020-07-06 RX ORDER — LEVETIRACETAM 250 MG/1
500 TABLET, FILM COATED ORAL ONCE
Refills: 0 | Status: COMPLETED | OUTPATIENT
Start: 2020-07-06 | End: 2020-07-06

## 2020-07-06 RX ORDER — ONDANSETRON 8 MG/1
4 TABLET, FILM COATED ORAL EVERY 6 HOURS
Refills: 0 | Status: DISCONTINUED | OUTPATIENT
Start: 2020-07-06 | End: 2020-07-13

## 2020-07-06 RX ORDER — HEPARIN SODIUM 5000 [USP'U]/ML
5000 INJECTION INTRAVENOUS; SUBCUTANEOUS EVERY 8 HOURS
Refills: 0 | Status: DISCONTINUED | OUTPATIENT
Start: 2020-07-06 | End: 2020-07-13

## 2020-07-06 RX ORDER — ACETAMINOPHEN 500 MG
650 TABLET ORAL EVERY 6 HOURS
Refills: 0 | Status: DISCONTINUED | OUTPATIENT
Start: 2020-07-06 | End: 2020-07-13

## 2020-07-06 RX ORDER — TETANUS TOXOID, REDUCED DIPHTHERIA TOXOID AND ACELLULAR PERTUSSIS VACCINE, ADSORBED 5; 2.5; 8; 8; 2.5 [IU]/.5ML; [IU]/.5ML; UG/.5ML; UG/.5ML; UG/.5ML
0.5 SUSPENSION INTRAMUSCULAR ONCE
Refills: 0 | Status: COMPLETED | OUTPATIENT
Start: 2020-07-06 | End: 2020-07-06

## 2020-07-06 RX ADMIN — HEPARIN SODIUM 5000 UNIT(S): 5000 INJECTION INTRAVENOUS; SUBCUTANEOUS at 23:31

## 2020-07-06 RX ADMIN — LEVETIRACETAM 400 MILLIGRAM(S): 250 TABLET, FILM COATED ORAL at 04:18

## 2020-07-06 RX ADMIN — CEFEPIME 100 MILLIGRAM(S): 1 INJECTION, POWDER, FOR SOLUTION INTRAMUSCULAR; INTRAVENOUS at 05:35

## 2020-07-06 RX ADMIN — SODIUM CHLORIDE 2000 MILLILITER(S): 9 INJECTION, SOLUTION INTRAVENOUS at 08:20

## 2020-07-06 RX ADMIN — Medication 2 MILLIGRAM(S): at 04:10

## 2020-07-06 RX ADMIN — SODIUM CHLORIDE 100 MILLILITER(S): 9 INJECTION INTRAMUSCULAR; INTRAVENOUS; SUBCUTANEOUS at 17:29

## 2020-07-06 RX ADMIN — HEPARIN SODIUM 5000 UNIT(S): 5000 INJECTION INTRAVENOUS; SUBCUTANEOUS at 14:53

## 2020-07-06 RX ADMIN — LEVETIRACETAM 420 MILLIGRAM(S): 250 TABLET, FILM COATED ORAL at 05:07

## 2020-07-06 RX ADMIN — SODIUM CHLORIDE 1000 MILLILITER(S): 9 INJECTION, SOLUTION INTRAVENOUS at 05:20

## 2020-07-06 RX ADMIN — TETANUS TOXOID, REDUCED DIPHTHERIA TOXOID AND ACELLULAR PERTUSSIS VACCINE, ADSORBED 0.5 MILLILITER(S): 5; 2.5; 8; 8; 2.5 SUSPENSION INTRAMUSCULAR at 06:10

## 2020-07-06 RX ADMIN — Medication 650 MILLIGRAM(S): at 05:27

## 2020-07-06 RX ADMIN — LEVETIRACETAM 500 MILLIGRAM(S): 250 TABLET, FILM COATED ORAL at 17:27

## 2020-07-06 RX ADMIN — MORPHINE SULFATE 4 MILLIGRAM(S): 50 CAPSULE, EXTENDED RELEASE ORAL at 08:20

## 2020-07-06 RX ADMIN — RISPERIDONE 0.5 MILLIGRAM(S): 4 TABLET ORAL at 17:27

## 2020-07-06 RX ADMIN — SODIUM CHLORIDE 1000 MILLILITER(S): 9 INJECTION, SOLUTION INTRAVENOUS at 05:04

## 2020-07-06 RX ADMIN — MORPHINE SULFATE 2 MILLIGRAM(S): 50 CAPSULE, EXTENDED RELEASE ORAL at 16:16

## 2020-07-06 RX ADMIN — Medication 650 MILLIGRAM(S): at 17:28
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
## 2020-07-06 NOTE — H&P ADULT - NSHPPHYSICALEXAM_GEN_ALL_CORE
Secondary Survey:   General: NAD  HEENT: Normocephalic, EOMI, PEERLA.  3cm laceration overlying left eyebrow   Neck: Soft, midline trachea. no cspine tenderness  Chest: No chest wall tenderness. or subq  emphysema   Cardiac: S1, S2, RRR  Respiratory: Bilateral breath sounds, clear and equal bilaterally  Abdomen: Soft, non-distended, non-tender, no rebound,   Groin: Normal appearing, pelvis stable   Ext: palp radial b/l UE, b/l DP palp in Lower Extrem.   Back: no TTP, no palpable runoff/stepoff/deformity

## 2020-07-06 NOTE — ED PROCEDURE NOTE - CPROC ED WOUND CLOSURE1
Vitals:  Patient Vitals for the past 12 hrs:   Temp Pulse Resp BP SpO2   01/16/20 1905  71 14 158/76 98 %   01/16/20 1158 97.3 °F (36.3 °C) 86 18 166/68 96 %   01/16/20 0835 99.3 °F (37.4 °C)       01/16/20 0835  88  169/76          Medications ordered:   Medications   amLODIPine (NORVASC) tablet 10 mg (10 mg Oral Given 1/16/20 0835)   hydroCHLOROthiazide (HYDRODIURIL) tablet 25 mg (25 mg Oral Given 1/16/20 0835)   QUEtiapine (SEROquel) tablet 100 mg (0 mg Oral Held 1/16/20 0217)         Lab findings:  No results found for this or any previous visit (from the past 12 hour(s)). EKG interpretation by ED Physician:      X-Ray, CT or other radiology findings or impressions:  CT HEAD WO CONT   Final Result   IMPRESSION:         1. No acute intracranial abnormality. 2. Subcortical and periventricular white matter low-attenuation favored to   reflect sequela of chronic ischemic microvascular change. 3. Frontal sinus osteoma and minor, nonspecific paranasal mucosal thickening. Note: Preliminary report sent to the Emergency Department by the radiology   resident at the time of the study. Progress notes, Consult notes or additional Procedure notes:   Patient turned over to me. Patient has bed at another facility    Reevaluation of patient:   Stable for transfer    Disposition:  Diagnosis:   1. Psychosis, unspecified psychosis type ([facility] [address]    2. Secondary hypertension        Disposition: Transfer    Follow-up Information    None           Patient's Medications   Start Taking    No medications on file   Continue Taking    AMLODIPINE BESYLATE (AMLODIPINE PO)    Take  by mouth. HYDROCHLOROTHIAZIDE (HYDRODIURIL) 25 MG TABLET    Take 25 mg by mouth daily.    These Medications have changed    No medications on file   Stop Taking    No medications on file skin suture
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
## 2020-07-06 NOTE — ED PROVIDER NOTE - CARE PLAN
Principal Discharge DX:	Fracture of transverse process of lumbar vertebra  Secondary Diagnosis:	Rib fractures  Secondary Diagnosis:	Closed compression fracture of lumbar vertebra  Secondary Diagnosis:	Seizure  Secondary Diagnosis:	Laceration of forehead Principal Discharge DX:	Fracture of transverse process of lumbar vertebra  Secondary Diagnosis:	Rib fractures  Secondary Diagnosis:	Seizure  Secondary Diagnosis:	Laceration of forehead  Secondary Diagnosis:	Closed compression fracture of thoracic vertebra Principal Discharge DX:	Fracture of transverse process of lumbar vertebra  Secondary Diagnosis:	Rib fractures  Secondary Diagnosis:	Seizure  Secondary Diagnosis:	Laceration of forehead  Secondary Diagnosis:	Closed compression fracture of thoracic vertebra  Secondary Diagnosis:	Rhabdomyolysis
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
## 2020-07-06 NOTE — ED ADULT TRIAGE NOTE - CCCP TRG CHIEF CMPLNT
Patient seen examined  Came back to see the patient because she was tachycardic  Heart rate is irregularly irregular  Possibly new onset atrial fibrillation  Did give 5 mg of IV Lopressor  Will give Cardizem  Likely start Cardizem 30 mg every 6 hours but I did cardiology is consulted  Echocardiogram has also been ordered  I will put the patient on a heparin drip after discussion with Cardiology  The patient is going to get a biopsy tomorrow    Patient will need some kind of anticoagulation but will get the workup in regards to her probable malignancy 1st and then decide we need to do in regards to the anticoagulation
Was approached by nursing staff regarding patient's uncontrolled heart rates  HR currently in the 140s on review  Pt seen and evaluated at bedside, currently asymptomatic, resting comfortably accompanied by a family member  Denies any chest pain, shortness of breath or heart palpitations  Will give another dose of IV Cardizem 15 mg x 1  If no improvement, will consider placing on cardizem gtt  Monitor for improvement      Carolyne Dean PA-C
fall
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
## 2020-07-06 NOTE — CONSULT NOTE ADULT - ATTENDING COMMENTS
no indication for neurosurgical intervention at this time.    TLSO brace    May ambulate with brace    pain control    follow-up in office
Please see my H&P
I have personally seen and examined this patient.  I have fully participated in the care of this patient.  I have reviewed all pertinent clinical information, including history, physical exam, plan and note.   I have reviewed all pertinent clinical information and reviewed all relevant imaging and diagnostic studies personally.  59 yo w/ prior TBI, anxiety, epilepsy currently w/ unwitnessed fall/LOC with multiple fractures likely traumatic with exam findings of aphasia and spastic RHP possibly secondary to old TBI but pt unclear regarding duration of symptoms.  Symptoms disproportionate to HCT findings therefore recommend MRI Brain NC, REEG, empiric IV thiamine repletion and PT evaluation.  Continue keppra 1000 mg BID.
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
## 2020-07-06 NOTE — ED PROVIDER NOTE - PHYSICAL EXAMINATION
CONSTITUTIONAL: well developed, well nourished, no acute distress  TRAUMA: ABC intact, GCS 15  HEAD: normocephalic, 3cm lac to L forehead  EYES: PERRL at 3 mm, EOMI, no raccoon eyes  ENT: no nasal discharge, no septal hematoma, no hemotympanum, no henning sign, moist mucous membranes, no mandibular instability, no mandibular malalignment  NECK: cervical collar in place, no midline tenderness, no stepoffs, no deformity  CV: regular rate, regular rhythm, equal distal pulses  RESP: lungs clear to auscultation bilaterally, normal work of breathing, symmetric rise and fall of chest   CHEST: no chest wall tenderness, no crepitus, no clavicular deformity/tenting  ABD: soft, nondistended, nontender, no rebound, no guarding, no rigidity, no pelvic instability  BACK: no midline T/L/S-spine tenderness, no stepoffs, no deformity  EXT: no obvious deformity, no tenderness of extremities, full ROM, cap refill < 2 seconds  SKIN: no rashes, no lacerations, no abrasions  NEURO: A&Ox3, motor strength 5/5 in all extremities, sensation grossly intact throughout, no focal neurological deficits, GCS 15  PSYCH: normal mood, appropriate affect CONSTITUTIONAL: well developed, well nourished, no acute distress  TRAUMA: ABC intact, GCS 15  HEAD: normocephalic, 3cm lac to L forehead  EYES: PERRL at 3 mm, EOMI, no raccoon eyes  ENT: no nasal discharge, no septal hematoma, no hemotympanum, no henning sign, moist mucous membranes, no mandibular instability, no mandibular malalignment  NECK: cervical collar in place, no midline tenderness, no stepoffs, no deformity  CV: regular rate, regular rhythm, equal distal pulses  RESP: lungs clear to auscultation bilaterally, normal work of breathing, symmetric rise and fall of chest   CHEST: no chest wall tenderness, no crepitus, no clavicular deformity/tenting  ABD: soft, nondistended, nontender, no rebound, no guarding, no rigidity, no pelvic instability  BACK: no midline T/L/S-spine tenderness, no stepoffs, no deformity  EXT: no obvious deformity, no tenderness of extremities, full ROM, cap refill < 2 seconds  SKIN: no rashes, 3 cm lac to L forehead, no abrasions  NEURO: moves all extremities, intermittently yells "come on" CONSTITUTIONAL: well developed, well nourished, no acute distress  TRAUMA: ABC intact, GCS 15  HEAD: normocephalic, 3cm lac to L forehead, abrasions to forehead/face/cheek  EYES: PERRL at 3 mm, EOMI, no raccoon eyes  ENT: no nasal discharge, no septal hematoma, no hemotympanum, no henning sign, moist mucous membranes, no mandibular instability, no mandibular malalignment  NECK: cervical collar in place, no midline tenderness, no stepoffs, no deformity  CV: regular rate, regular rhythm, equal distal pulses  RESP: lungs clear to auscultation bilaterally, normal work of breathing, symmetric rise and fall of chest   CHEST: no chest wall tenderness, no crepitus, no clavicular deformity/tenting  ABD: soft, nondistended, nontender, no rebound, no guarding, no rigidity, no pelvic instability  BACK: no midline T/L/S-spine tenderness, no stepoffs, no deformity  EXT: no obvious deformity, no tenderness of extremities, full ROM, cap refill < 2 seconds  SKIN: 3 cm lac to L forehead, abrasions to forehead/face/cheek, abrasions to fingers  NEURO: moves all extremities, intermittently yells "come on"

## 2020-07-06 NOTE — H&P ADULT - NSHPLABSRESULTS_GEN_ALL_CORE
13.9   12.23 )-----------( 188      ( 06 Jul 2020 04:04 )             41.2       07-06    137  |  97<L>  |  24<H>  ----------------------------<  224<H>  4.5   |  22  |  1.0    Ca    9.5      06 Jul 2020 04:04    TPro  7.7  /  Alb  4.6  /  TBili  0.6  /  DBili  x   /  AST  92<H>  /  ALT  109<H>  /  AlkPhos  65  07-06        PT/INR - ( 06 Jul 2020 04:04 )   PT: 13.30 sec;   INR: 1.16 ratio       PTT - ( 06 Jul 2020 04:04 )  PTT:26.2 sec    Lactate Trend  07-06 @ 04:04 Lactate:4.5       CARDIAC MARKERS ( 06 Jul 2020 04:04 )  x     / <0.01 ng/mL / 2335 U/L / x     / x            CAPILLARY BLOOD GLUCOSE    POCT Blood Glucose.: 228 mg/dL (06 Jul 2020 04:08)        RADIOLOGY  < from: Xray Chest 1 View AP/PA (07.06.20 @ 04:44) >    Impression:      No radiographic evidence of acute cardiopulmonary disease.    < end of copied text >    < from: Xray Pelvis AP only (07.06.20 @ 04:44) >    Impression:    Left L3 transverse process fracture better seen on CT.   No pelvic bone fractures.    < end of copied text >    < from: CT Maxillofacial No Cont (07.06.20 @ 05:05) >    IMPRESSION:    Soft tissue swelling/hematoma noted over the left orbital bone and zygomatic arch without evidence of underlying fracture.    Left globe intact. No retrobulbar hematoma.    < end of copied text >    < from: CT Chest w/ IV Cont (07.06.20 @ 05:08) >    IMPRESSION:  Images degraded by motion artifact.    1. Acute left-sided transverse process fractures of L1, L2, and L3. No retroperitoneal hematoma.    2. Acute mildly displaced fractures of the left posterior 12th rib, which appears to be fractured in two places.     3. Acute wedge fracture of the L1 anterosuperior vertebral body. Age indeterminate, mild T3-T5 vertebral body compression fractures.    4. Questionable age-indeterminate fractures of the right anterior 3rd - 5th ribs. The evaluation of the ribs is limited secondary to motion artifact.    < end of copied text >

## 2020-07-06 NOTE — CONSULT NOTE ADULT - ASSESSMENT
IMPRESSION: Rehab of multitrauma    PRECAUTIONS: [  ] Cardiac  [  ] Respiratory  [  ] Seizures [  ] Contact Isolation  [  ] Droplet Isolation  [  ] Other    Weight Bearing Status:     RECOMMENDATION:    Out of Bed to Chair     DVT/Decubiti Prophylaxis    REHAB PLAN:     [x   ] Bedside P/T 3-5 times a week   [ x  ]   Bedside O/T  2-3 times a week             [   ] No Rehab Therapy Indicated                   [   ]  Speech Therapy   Conditioning/ROM                                    ADL  Bed Mobility                                               Conditioning/ROM  Transfers                                                     Bed Mobility  Sitting /Standing Balance                         Transfers                                        Gait Training                                               Sitting/Standing Balance  Stair Training [   ]Applicable                    Home equipment Eval                                                                        Splinting  [   ] Only      GOALS:   ADL   [ x  ]   Independent                    Transfers  [ x  ] Independent                          Ambulation  [  x ] Independent     [  x  ] With device                            [   ]  CG                                                         [   ]  CG                                                                  [   ] CG                            [    ] Min A                                                   [   ] Min A                                                              [   ] Min  A          DISCHARGE PLAN:   [   ]  Good candidate for Intensive Rehabilitation/Hospital based                                             Will tolerate 3hrs Intensive Rehab Daily                                       [ x   ]  Short Term Rehab in Skilled Nursing Facility                            vs           [ x   ]  Home with Outpatient or VN services                                         [    ]  Possible Candidate for Intensive Hospital based Rehab
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
## 2020-07-06 NOTE — ED PROVIDER NOTE - SECONDARY DIAGNOSIS.
Closed compression fracture of lumbar vertebra Seizure Laceration of forehead Rib fractures Closed compression fracture of thoracic vertebra Rhabdomyolysis
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
## 2020-07-07 ENCOUNTER — TRANSCRIPTION ENCOUNTER (OUTPATIENT)
Age: 60
End: 2020-07-07

## 2020-07-07 LAB
ANION GAP SERPL CALC-SCNC: 10 MMOL/L — SIGNIFICANT CHANGE UP (ref 7–14)
BASOPHILS # BLD AUTO: 0.02 K/UL — SIGNIFICANT CHANGE UP (ref 0–0.2)
BASOPHILS NFR BLD AUTO: 0.3 % — SIGNIFICANT CHANGE UP (ref 0–1)
BUN SERPL-MCNC: 13 MG/DL — SIGNIFICANT CHANGE UP (ref 10–20)
CALCIUM SERPL-MCNC: 8.7 MG/DL — SIGNIFICANT CHANGE UP (ref 8.5–10.1)
CHLORIDE SERPL-SCNC: 104 MMOL/L — SIGNIFICANT CHANGE UP (ref 98–110)
CK SERPL-CCNC: 3770 U/L — HIGH (ref 0–225)
CO2 SERPL-SCNC: 25 MMOL/L — SIGNIFICANT CHANGE UP (ref 17–32)
CREAT SERPL-MCNC: 0.7 MG/DL — SIGNIFICANT CHANGE UP (ref 0.7–1.5)
EOSINOPHIL # BLD AUTO: 0 K/UL — SIGNIFICANT CHANGE UP (ref 0–0.7)
EOSINOPHIL NFR BLD AUTO: 0 % — SIGNIFICANT CHANGE UP (ref 0–8)
GLUCOSE SERPL-MCNC: 266 MG/DL — HIGH (ref 70–99)
HCT VFR BLD CALC: 42.7 % — SIGNIFICANT CHANGE UP (ref 42–52)
HCV AB S/CO SERPL IA: 30.37 COI — HIGH
HCV AB SERPL-IMP: REACTIVE
HGB BLD-MCNC: 14.6 G/DL — SIGNIFICANT CHANGE UP (ref 14–18)
IMM GRANULOCYTES NFR BLD AUTO: 0.8 % — HIGH (ref 0.1–0.3)
LYMPHOCYTES # BLD AUTO: 1.13 K/UL — LOW (ref 1.2–3.4)
LYMPHOCYTES # BLD AUTO: 15.4 % — LOW (ref 20.5–51.1)
MAGNESIUM SERPL-MCNC: 2.2 MG/DL — SIGNIFICANT CHANGE UP (ref 1.8–2.4)
MCHC RBC-ENTMCNC: 30.7 PG — SIGNIFICANT CHANGE UP (ref 27–31)
MCHC RBC-ENTMCNC: 34.2 G/DL — SIGNIFICANT CHANGE UP (ref 32–37)
MCV RBC AUTO: 89.7 FL — SIGNIFICANT CHANGE UP (ref 80–94)
MONOCYTES # BLD AUTO: 0.79 K/UL — HIGH (ref 0.1–0.6)
MONOCYTES NFR BLD AUTO: 10.7 % — HIGH (ref 1.7–9.3)
NEUTROPHILS # BLD AUTO: 5.35 K/UL — SIGNIFICANT CHANGE UP (ref 1.4–6.5)
NEUTROPHILS NFR BLD AUTO: 72.8 % — SIGNIFICANT CHANGE UP (ref 42.2–75.2)
NRBC # BLD: 0 /100 WBCS — SIGNIFICANT CHANGE UP (ref 0–0)
PHOSPHATE SERPL-MCNC: 1.8 MG/DL — LOW (ref 2.1–4.9)
PLATELET # BLD AUTO: 138 K/UL — SIGNIFICANT CHANGE UP (ref 130–400)
POTASSIUM SERPL-MCNC: 4.1 MMOL/L — SIGNIFICANT CHANGE UP (ref 3.5–5)
POTASSIUM SERPL-SCNC: 4.1 MMOL/L — SIGNIFICANT CHANGE UP (ref 3.5–5)
RBC # BLD: 4.76 M/UL — SIGNIFICANT CHANGE UP (ref 4.7–6.1)
RBC # FLD: 12.9 % — SIGNIFICANT CHANGE UP (ref 11.5–14.5)
SARS-COV-2 RNA SPEC QL NAA+PROBE: SIGNIFICANT CHANGE UP
SODIUM SERPL-SCNC: 139 MMOL/L — SIGNIFICANT CHANGE UP (ref 135–146)
WBC # BLD: 7.35 K/UL — SIGNIFICANT CHANGE UP (ref 4.8–10.8)
WBC # FLD AUTO: 7.35 K/UL — SIGNIFICANT CHANGE UP (ref 4.8–10.8)

## 2020-07-07 PROCEDURE — 99232 SBSQ HOSP IP/OBS MODERATE 35: CPT

## 2020-07-07 PROCEDURE — 71045 X-RAY EXAM CHEST 1 VIEW: CPT | Mod: 26

## 2020-07-07 PROCEDURE — 99233 SBSQ HOSP IP/OBS HIGH 50: CPT

## 2020-07-07 RX ORDER — POTASSIUM CHLORIDE 20 MEQ
10 PACKET (EA) ORAL ONCE
Refills: 0 | Status: COMPLETED | OUTPATIENT
Start: 2020-07-07 | End: 2020-07-07

## 2020-07-07 RX ORDER — NICOTINE POLACRILEX 2 MG
1 GUM BUCCAL DAILY
Refills: 0 | Status: DISCONTINUED | OUTPATIENT
Start: 2020-07-07 | End: 2020-07-13

## 2020-07-07 RX ADMIN — HEPARIN SODIUM 5000 UNIT(S): 5000 INJECTION INTRAVENOUS; SUBCUTANEOUS at 21:02

## 2020-07-07 RX ADMIN — SENNA PLUS 2 TABLET(S): 8.6 TABLET ORAL at 21:02

## 2020-07-07 RX ADMIN — Medication 325 MILLIGRAM(S): at 11:04

## 2020-07-07 RX ADMIN — Medication 100 MILLIEQUIVALENT(S): at 03:20

## 2020-07-07 RX ADMIN — HEPARIN SODIUM 5000 UNIT(S): 5000 INJECTION INTRAVENOUS; SUBCUTANEOUS at 06:25

## 2020-07-07 RX ADMIN — RISPERIDONE 0.5 MILLIGRAM(S): 4 TABLET ORAL at 17:15

## 2020-07-07 RX ADMIN — RISPERIDONE 0.5 MILLIGRAM(S): 4 TABLET ORAL at 06:27

## 2020-07-07 RX ADMIN — LEVETIRACETAM 500 MILLIGRAM(S): 250 TABLET, FILM COATED ORAL at 17:13

## 2020-07-07 RX ADMIN — SENNA PLUS 2 TABLET(S): 8.6 TABLET ORAL at 01:32

## 2020-07-07 RX ADMIN — Medication 650 MILLIGRAM(S): at 17:12

## 2020-07-07 RX ADMIN — LEVETIRACETAM 500 MILLIGRAM(S): 250 TABLET, FILM COATED ORAL at 06:27

## 2020-07-07 RX ADMIN — HEPARIN SODIUM 5000 UNIT(S): 5000 INJECTION INTRAVENOUS; SUBCUTANEOUS at 13:14

## 2020-07-07 RX ADMIN — Medication 1 PATCH: at 20:21

## 2020-07-07 RX ADMIN — Medication 650 MILLIGRAM(S): at 06:27

## 2020-07-07 RX ADMIN — PANTOPRAZOLE SODIUM 40 MILLIGRAM(S): 20 TABLET, DELAYED RELEASE ORAL at 06:27

## 2020-07-07 RX ADMIN — Medication 1 PATCH: at 17:14

## 2020-07-07 NOTE — PROGRESS NOTE ADULT - SUBJECTIVE AND OBJECTIVE BOX
GENERAL SURGERY PROGRESS NOTE     SUNSHINEJORDENJERONIMO FABIOLA  [address] [place] [address] day :2d    Surgical Attending: Daryl Cui      Overnight events:  Patient seen and examined at bedside. no acute events overnight       T(F): 100 (07-07-20 @ 06:26), Max: 100 (07-07-20 @ 06:26)  HR: 87 (07-07-20 @ 06:26) (72 - 87)  BP: 157/87 (07-07-20 @ 06:26) (140/96 - 166/87)  ABP: --  ABP(mean): --  RR: 20 (07-07-20 @ 06:26) (14 - 20)  SpO2: 97% (07-06-20 @ 23:18) (97% - 98%)      07-06-20 @ 07:01  -  07-07-20 @ 07:00  --------------------------------------------------------  IN:  Total IN: 0 mL    OUT:    Voided: 1050 mL  Total OUT: 1050 mL    Total NET: -1050 mL        DIET/FLUIDS: sodium chloride 0.9%. 1000 milliLiter(s) IV Continuous <Continuous>    GI proph:  pantoprazole    Tablet 40 milliGRAM(s) Oral before breakfast    AC/ proph: heparin   Injectable 5000 Unit(s) SubCutaneous every 8 hours    ABx:     PHYSICAL EXAM:  GENERAL: NAD, well-appearing  CHEST/LUNG: Clear to auscultation bilaterally  HEART: Regular rate and rhythm  ABDOMEN: Soft, Nontender, Nondistended;   EXTREMITIES:  No clubbing, cyanosis, or edema. rightsided weakness       LABS               13.5   6.49  )-----------( 101      ( 06 Jul 2020 20:43 )             40.4       Auto Immature Granulocyte %: 0.3 % (07-06-20 @ 20:43)  Auto Neutrophil %: 61.3 % (07-06-20 @ 20:43)    07-06    141  |  102  |  12  ----------------------------<  129<H>  3.9   |  26  |  0.7      Calcium, Total Serum: 8.8 mg/dL (07-06-20 @ 20:43)      LFTs:             7.7  | 0.6  | 92       ------------------[65      ( 06 Jul 2020 04:04 )  4.6  | x    | 109         Lipase:x      Amylase:x         Blood Gas Venous - Lactate: 4.0 mmoL/L (07-06-20 @ 04:18)  Lactate, Blood: 4.5 mmol/L (07-06-20 @ 04:04)      Coags:     13.30  ----< 1.16    ( 06 Jul 2020 04:04 )     26.2        CARDIAC MARKERS ( 06 Jul 2020 16:48 )  x     / x     / 7032 U/L / x     / x      CARDIAC MARKERS ( 06 Jul 2020 04:04 )  x     / <0.01 ng/mL / 2335 U/L / x     / x

## 2020-07-07 NOTE — PROGRESS NOTE ADULT - ASSESSMENT
A/P:  FABIOLA PORTILLO is a 60yMale HD/POD___ from.  Patient seen & examined at bedside. In NAD & has no complaints. NPO, -ambulation. TLSO brace recommended by ortho. To start RD. F/U with neuro.    Plan:   - TSLO brace  - progress to regular diet  - IVL  - IS  - f/u vitals  - f/u labs & replete if necessary  - DVT PPX  - GI PPX  - f/u with neurology  - Speak w/ SW/CM  - PT/OT   - Begin dispo planning for D/C A/P:  FABIOLA PORTILLO is a 60yMale HD#2 from [place].  Patient seen & examined at bedside. In NAD & has no complaints. NPO, -ambulation. TLSO brace recommended by ortho. To start RD. F/U with neuro.    Plan:   - TSLO brace  - progress to regular diet  - IVL  - IS  - f/u vitals  - f/u labs & replete if necessary  - DVT PPX  - GI PPX  - f/u with neurology  - Speak w/ SW/CM  - PT/OT   - Begin dispo planning for D/C

## 2020-07-07 NOTE — PROGRESS NOTE ADULT - ATTENDING COMMENTS
Refill requested for:     Loratadine 10 mg  Last filled on:     08/15/2017 # 90 +2  Last office visit:     03/21/2018 for illness,       Please advise on refills.     Patient had no seizures overnight.  CTA neck and brain negative.  Patient has baseline right side weakness - right motor 4/5 at baseline.    Assessment/Plan:  - Neurology evaluation pending  - Neurosurgery f/u - recommended TLSO brace.  - get CXR today  - Incentive spirometer  - DVT prophylaxis  -  for discharge planning .

## 2020-07-07 NOTE — PROGRESS NOTE ADULT - ATTENDING COMMENTS
Patient had no seizures overnight.  CTA neck and brain negative.  Patient has baseline right side weakness - right motor 4/5 at baseline.    Assessment/Plan:  - Neurology evaluation pending  - Neurosurgery f/u - recommended TLSO brace.  - get CXR today  - Incentive spirometer  - DVT prophylaxis  -  for discharge planning

## 2020-07-07 NOTE — OCCUPATIONAL THERAPY INITIAL EVALUATION ADULT - SPECIFY REASON(S)
OT unable to complete IE 2* to pt not having TLSO. OT will attempt to complete eval at a later time.

## 2020-07-07 NOTE — PROGRESS NOTE ADULT - SUBJECTIVE AND OBJECTIVE BOX
GENERAL SURGERY PROGRESS NOTE     FABIOLA PORTILLO  [address] [place] [address] day :2d  Surgical Attending: Daryl Cui  Overnight events:    T(F): 100 (07-07-20 @ 06:26), Max: 100 (07-07-20 @ 06:26)  HR: 87 (07-07-20 @ 06:26) (72 - 87)  BP: 157/87 (07-07-20 @ 06:26) (140/96 - 166/87)  RR: 20 (07-07-20 @ 06:26) (18 - 20)  SpO2: 97% (07-06-20 @ 23:18) (97% - 98%)      07-06-20 @ 07:01  -  07-07-20 @ 07:00  --------------------------------------------------------  IN:  Total IN: 0 mL    OUT:    Voided: 1050 mL  Total OUT: 1050 mL    Total NET: -1050 mL        DIET/FLUIDS: sodium chloride 0.9%. 1000 milliLiter(s) IV Continuous <Continuous>      URINE:  1050 on condom catheter     GI proph:  pantoprazole    Tablet 40 milliGRAM(s) Oral before breakfast    AC/ proph: heparin   Injectable 5000 Unit(s) SubCutaneous every 8 hours    ABx:     PHYSICAL EXAM:  GENERAL: NAD  HEENT: normocephalic.   CHEST/LUNG: symmetric chest expansion  HEART: Regular rate  ABDOMEN: Soft, Nontender, Nondistended;   EXTREMITIES:  No clubbing, cyanosis, or edema. Decreased strength noted on R side of UE and LE.      LABS  Labs:  CAPILLARY BLOOD GLUCOSE                              13.5   6.49  )-----------( 101      ( 06 Jul 2020 20:43 )             40.4       Auto Immature Granulocyte %: 0.3 % (07-06-20 @ 20:43)  Auto Neutrophil %: 61.3 % (07-06-20 @ 20:43)    07-06    141  |  102  |  12  ----------------------------<  129<H>  3.9   |  26  |  0.7      Calcium, Total Serum: 8.8 mg/dL (07-06-20 @ 20:43)      LFTs:             7.7  | 0.6  | 92       ------------------[65      ( 06 Jul 2020 04:04 )  4.6  | x    | 109         Lipase:x      Amylase:x         Blood Gas Venous - Lactate: 4.0 mmoL/L (07-06-20 @ 04:18)  Lactate, Blood: 4.5 mmol/L (07-06-20 @ 04:04)      Coags:     13.30  ----< 1.16    ( 06 Jul 2020 04:04 )     26.2        CARDIAC MARKERS ( 06 Jul 2020 16:48 )  x     / x     / 7032 U/L / x     / x      CARDIAC MARKERS ( 06 Jul 2020 04:04 )  x     / <0.01 ng/mL / 2335 U/L / x     / x          RADIOLOGY & ADDITIONAL TESTS:  < from: CT Angio Head w/ IV Cont (07.06.20 @ 21:39) >  No CT evidence of acute arterial occlusion in the head and neck.    Moderate stenosis of the cavernous segments of the bilateral internal carotid arteries (Segments C4, C5 and C6 bilaterally).    < end of copied text >

## 2020-07-07 NOTE — PROGRESS NOTE ADULT - ASSESSMENT
A/P:  FABIOLA PORTILLO is a 60yMale HD2 s/p found down. pt poor historian, hx of tbi      Plan:   - CTA head and neck were negative for acute finding   - Routine EEG pending. Seizure precautions. Keep magnesium >2  - Follow up neurosurgery.   - pain control  - encourage ambulation with PT, TLSO brace script ordered.   - encourage patient to take deep breaths and/or use incentive spirometer  - f/u labs and make appropriate repletions if necessary  - discussed with patient   - dispo planning

## 2020-07-08 LAB
A1C WITH ESTIMATED AVERAGE GLUCOSE RESULT: 6.3 % — HIGH (ref 4–5.6)
ANION GAP SERPL CALC-SCNC: 16 MMOL/L — HIGH (ref 7–14)
BASOPHILS # BLD AUTO: 0.01 K/UL — SIGNIFICANT CHANGE UP (ref 0–0.2)
BASOPHILS NFR BLD AUTO: 0.1 % — SIGNIFICANT CHANGE UP (ref 0–1)
BUN SERPL-MCNC: 23 MG/DL — HIGH (ref 10–20)
CALCIUM SERPL-MCNC: 8.9 MG/DL — SIGNIFICANT CHANGE UP (ref 8.5–10.1)
CHLORIDE SERPL-SCNC: 106 MMOL/L — SIGNIFICANT CHANGE UP (ref 98–110)
CO2 SERPL-SCNC: 20 MMOL/L — SIGNIFICANT CHANGE UP (ref 17–32)
CREAT SERPL-MCNC: 1.1 MG/DL — SIGNIFICANT CHANGE UP (ref 0.7–1.5)
EOSINOPHIL # BLD AUTO: 0.01 K/UL — SIGNIFICANT CHANGE UP (ref 0–0.7)
EOSINOPHIL NFR BLD AUTO: 0.1 % — SIGNIFICANT CHANGE UP (ref 0–8)
ESTIMATED AVERAGE GLUCOSE: 134 MG/DL — HIGH (ref 68–114)
GLUCOSE BLDC GLUCOMTR-MCNC: 250 MG/DL — HIGH (ref 70–99)
GLUCOSE BLDC GLUCOMTR-MCNC: 282 MG/DL — HIGH (ref 70–99)
GLUCOSE BLDC GLUCOMTR-MCNC: 346 MG/DL — HIGH (ref 70–99)
GLUCOSE BLDC GLUCOMTR-MCNC: 378 MG/DL — HIGH (ref 70–99)
GLUCOSE SERPL-MCNC: 309 MG/DL — HIGH (ref 70–99)
HCT VFR BLD CALC: 40.3 % — LOW (ref 42–52)
HGB BLD-MCNC: 13.6 G/DL — LOW (ref 14–18)
IMM GRANULOCYTES NFR BLD AUTO: 0.4 % — HIGH (ref 0.1–0.3)
LYMPHOCYTES # BLD AUTO: 1.3 K/UL — SIGNIFICANT CHANGE UP (ref 1.2–3.4)
LYMPHOCYTES # BLD AUTO: 18.3 % — LOW (ref 20.5–51.1)
MAGNESIUM SERPL-MCNC: 2.1 MG/DL — SIGNIFICANT CHANGE UP (ref 1.8–2.4)
MCHC RBC-ENTMCNC: 30.4 PG — SIGNIFICANT CHANGE UP (ref 27–31)
MCHC RBC-ENTMCNC: 33.7 G/DL — SIGNIFICANT CHANGE UP (ref 32–37)
MCV RBC AUTO: 90.2 FL — SIGNIFICANT CHANGE UP (ref 80–94)
MONOCYTES # BLD AUTO: 0.69 K/UL — HIGH (ref 0.1–0.6)
MONOCYTES NFR BLD AUTO: 9.7 % — HIGH (ref 1.7–9.3)
NEUTROPHILS # BLD AUTO: 5.08 K/UL — SIGNIFICANT CHANGE UP (ref 1.4–6.5)
NEUTROPHILS NFR BLD AUTO: 71.4 % — SIGNIFICANT CHANGE UP (ref 42.2–75.2)
NRBC # BLD: 0 /100 WBCS — SIGNIFICANT CHANGE UP (ref 0–0)
PHOSPHATE SERPL-MCNC: 2.6 MG/DL — SIGNIFICANT CHANGE UP (ref 2.1–4.9)
PLATELET # BLD AUTO: 145 K/UL — SIGNIFICANT CHANGE UP (ref 130–400)
POTASSIUM SERPL-MCNC: 3.9 MMOL/L — SIGNIFICANT CHANGE UP (ref 3.5–5)
POTASSIUM SERPL-SCNC: 3.9 MMOL/L — SIGNIFICANT CHANGE UP (ref 3.5–5)
RBC # BLD: 4.47 M/UL — LOW (ref 4.7–6.1)
RBC # FLD: 13 % — SIGNIFICANT CHANGE UP (ref 11.5–14.5)
SODIUM SERPL-SCNC: 142 MMOL/L — SIGNIFICANT CHANGE UP (ref 135–146)
WBC # BLD: 7.12 K/UL — SIGNIFICANT CHANGE UP (ref 4.8–10.8)
WBC # FLD AUTO: 7.12 K/UL — SIGNIFICANT CHANGE UP (ref 4.8–10.8)

## 2020-07-08 PROCEDURE — 99232 SBSQ HOSP IP/OBS MODERATE 35: CPT

## 2020-07-08 PROCEDURE — 95816 EEG AWAKE AND DROWSY: CPT | Mod: 26

## 2020-07-08 RX ORDER — SODIUM CHLORIDE 9 MG/ML
1000 INJECTION, SOLUTION INTRAVENOUS
Refills: 0 | Status: DISCONTINUED | OUTPATIENT
Start: 2020-07-08 | End: 2020-07-09

## 2020-07-08 RX ORDER — DEXTROSE 50 % IN WATER 50 %
15 SYRINGE (ML) INTRAVENOUS ONCE
Refills: 0 | Status: DISCONTINUED | OUTPATIENT
Start: 2020-07-08 | End: 2020-07-09

## 2020-07-08 RX ORDER — INSULIN LISPRO 100/ML
VIAL (ML) SUBCUTANEOUS
Refills: 0 | Status: DISCONTINUED | OUTPATIENT
Start: 2020-07-08 | End: 2020-07-13

## 2020-07-08 RX ORDER — DEXTROSE 50 % IN WATER 50 %
25 SYRINGE (ML) INTRAVENOUS ONCE
Refills: 0 | Status: DISCONTINUED | OUTPATIENT
Start: 2020-07-08 | End: 2020-07-09

## 2020-07-08 RX ORDER — GLUCAGON INJECTION, SOLUTION 0.5 MG/.1ML
1 INJECTION, SOLUTION SUBCUTANEOUS ONCE
Refills: 0 | Status: DISCONTINUED | OUTPATIENT
Start: 2020-07-08 | End: 2020-07-09

## 2020-07-08 RX ORDER — DEXTROSE 50 % IN WATER 50 %
12.5 SYRINGE (ML) INTRAVENOUS ONCE
Refills: 0 | Status: DISCONTINUED | OUTPATIENT
Start: 2020-07-08 | End: 2020-07-09

## 2020-07-08 RX ADMIN — RISPERIDONE 0.5 MILLIGRAM(S): 4 TABLET ORAL at 05:40

## 2020-07-08 RX ADMIN — Medication 650 MILLIGRAM(S): at 17:20

## 2020-07-08 RX ADMIN — PANTOPRAZOLE SODIUM 40 MILLIGRAM(S): 20 TABLET, DELAYED RELEASE ORAL at 05:40

## 2020-07-08 RX ADMIN — Medication 10: at 11:34

## 2020-07-08 RX ADMIN — HEPARIN SODIUM 5000 UNIT(S): 5000 INJECTION INTRAVENOUS; SUBCUTANEOUS at 13:29

## 2020-07-08 RX ADMIN — HEPARIN SODIUM 5000 UNIT(S): 5000 INJECTION INTRAVENOUS; SUBCUTANEOUS at 05:40

## 2020-07-08 RX ADMIN — RISPERIDONE 0.5 MILLIGRAM(S): 4 TABLET ORAL at 17:20

## 2020-07-08 RX ADMIN — HEPARIN SODIUM 5000 UNIT(S): 5000 INJECTION INTRAVENOUS; SUBCUTANEOUS at 21:34

## 2020-07-08 RX ADMIN — SENNA PLUS 2 TABLET(S): 8.6 TABLET ORAL at 21:34

## 2020-07-08 RX ADMIN — LEVETIRACETAM 500 MILLIGRAM(S): 250 TABLET, FILM COATED ORAL at 17:20

## 2020-07-08 RX ADMIN — LEVETIRACETAM 500 MILLIGRAM(S): 250 TABLET, FILM COATED ORAL at 05:40

## 2020-07-08 RX ADMIN — Medication 650 MILLIGRAM(S): at 05:40

## 2020-07-08 RX ADMIN — Medication 650 MILLIGRAM(S): at 23:52

## 2020-07-08 RX ADMIN — Medication 85 MILLIMOLE(S): at 04:08

## 2020-07-08 RX ADMIN — Medication 650 MILLIGRAM(S): at 00:26

## 2020-07-08 RX ADMIN — Medication 8: at 16:43

## 2020-07-08 RX ADMIN — Medication 650 MILLIGRAM(S): at 11:35

## 2020-07-08 NOTE — PHYSICAL THERAPY INITIAL EVALUATION ADULT - RANGE OF MOTION EXAMINATION, REHAB EVAL
B UE's grossly WFL AAROM with pain noted in R hand. L UE grossly WFL AAROM. R LE grossly WFL AAROM hip flex, knee flex/ext and ankle DF/PF grossly WFL. L LE grossly WFL.

## 2020-07-08 NOTE — PHYSICAL THERAPY INITIAL EVALUATION ADULT - GENERAL OBSERVATIONS, REHAB EVAL
11:00-11:30. Pt encountered sitting in chair in NAD, + IV locked by Jeannine RN of PT, +stitches to L side of forehead, + hematoma//ecchymosis on L side of face, + TLSO, +lap belt and chair alarm. Pt without complaints, agreeable to PT.

## 2020-07-08 NOTE — OCCUPATIONAL THERAPY INITIAL EVALUATION ADULT - PERSONAL SAFETY AND JUDGMENT, REHAB EVAL
impaired/Pt demonstrated severe impairment with safety. During OT evaluation pt attempted to take off TLSO.

## 2020-07-08 NOTE — OCCUPATIONAL THERAPY INITIAL EVALUATION ADULT - GENERAL OBSERVATIONS, REHAB EVAL
Pt encountered sitting in bed side chair +IV drip, +Posey belt, +TLSO in NAD. Pt agreeable to participate in OT evaluation. Pt was left seated in bedside chair +IV drip, +Posey belt, +TLSO, in NAD. RN aware.

## 2020-07-08 NOTE — PHYSICAL THERAPY INITIAL EVALUATION ADULT - CRITERIA FOR SKILLED THERAPEUTIC INTERVENTIONS
rehab potential/therapy frequency/predicted duration of therapy intervention/impairments found/risk reduction/prevention/functional limitations in following categories

## 2020-07-08 NOTE — OCCUPATIONAL THERAPY INITIAL EVALUATION ADULT - RANGE OF MOTION EXAMINATION, UPPER EXTREMITY
LUE shoulder 3/4 AROM. Rest of LUE arm distally was WFL. RUE 1/2 shoulder range. elbow was WFL, pt was unable to oppose digits 1-5.

## 2020-07-08 NOTE — OCCUPATIONAL THERAPY INITIAL EVALUATION ADULT - PLANNED THERAPY INTERVENTIONS, OT EVAL
balance training/bed mobility training/ADL retraining/cognitive, visual perceptual/transfer training/strengthening

## 2020-07-08 NOTE — PROGRESS NOTE ADULT - SUBJECTIVE AND OBJECTIVE BOX
GENERAL SURGERY PROGRESS NOTE     FABIOLA PORTILLO  60y  Male  Hospital day :2d    OVERNIGHT EVENTS: no acute events overnight, hypophosphatemia repleted    T(F): 99.4 (07-07-20 @ 20:29), Max: 100 (07-07-20 @ 06:26)  HR: 102 (07-07-20 @ 20:29) (87 - 107)  BP: 135/90 (07-07-20 @ 20:29) (135/90 - 157/87)  RR: 18 (07-07-20 @ 20:29) (18 - 20)    DIET/FLUIDS: sodium phosphate IVPB 30 milliMole(s) IV Intermittent once     GI proph:  pantoprazole    Tablet 40 milliGRAM(s) Oral before breakfast    AC/ proph: heparin   Injectable 5000 Unit(s) SubCutaneous every 8 hours    PHYSICAL EXAM:  GENERAL: NAD, well-appearing  ABDOMEN: Soft, Nontender, Nondistended;   EXTREMITIES:  No clubbing, cyanosis, or edema. Decreased strength noted on R side of UE and LE.      LABS  Labs:  CAPILLARY BLOOD GLUCOSE                              14.6   7.35  )-----------( 138      ( 07 Jul 2020 21:55 )             42.7       Auto Immature Granulocyte %: 0.8 % (07-07-20 @ 21:55)  Auto Neutrophil %: 72.8 % (07-07-20 @ 21:55)    07-07    139  |  104  |  13  ----------------------------<  266<H>  4.1   |  25  |  0.7      Calcium, Total Serum: 8.7 mg/dL (07-07-20 @ 21:55)      LFTs:             7.7  | 0.6  | 92       ------------------[65      ( 06 Jul 2020 04:04 )  4.6  | x    | 109         Lipase:x      Amylase:x         Blood Gas Venous - Lactate: 4.0 mmoL/L (07-06-20 @ 04:18)  Lactate, Blood: 4.5 mmol/L (07-06-20 @ 04:04)      Coags:     13.30  ----< 1.16    ( 06 Jul 2020 04:04 )     26.2        CARDIAC MARKERS ( 07 Jul 2020 21:55 )  x     / x     / 3770 U/L / x     / x      CARDIAC MARKERS ( 06 Jul 2020 16:48 )  x     / x     / 7032 U/L / x     / x      CARDIAC MARKERS ( 06 Jul 2020 04:04 )  x     / <0.01 ng/mL / 2335 U/L / x     / x

## 2020-07-08 NOTE — PHYSICAL THERAPY INITIAL EVALUATION ADULT - MANUAL MUSCLE TESTING RESULTS, REHAB EVAL
R UE: shoulder flex 3-/5,  3-/5. L UE: shoulder flex 3-/5,  4/5. R LE: hip flex 3-/5, knee flex/ext 3/5. ankle DF/PF 3/5. L LE:  3/5 t/o

## 2020-07-08 NOTE — OCCUPATIONAL THERAPY INITIAL EVALUATION ADULT - NS ASR FOLLOW COMMAND OT EVAL
75% of the time/able to follow single-step instructions/Pt did not answer questions appropriately despite increased time for processing .
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
## 2020-07-08 NOTE — OCCUPATIONAL THERAPY INITIAL EVALUATION ADULT - ADDITIONAL COMMENTS
According to Pt's chart pt lives alone with a HHA 9AM-6PM, as part of a TBI program. Pt was unable to provide details on his living situation. Pt had RUE weakness at baseline.
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
## 2020-07-09 ENCOUNTER — APPOINTMENT (OUTPATIENT)
Dept: INTERNAL MEDICINE | Facility: CLINIC | Age: 60
End: 2020-07-09

## 2020-07-09 LAB
A1C WITH ESTIMATED AVERAGE GLUCOSE RESULT: 6.5 % — HIGH (ref 4–5.6)
A1C WITH ESTIMATED AVERAGE GLUCOSE RESULT: 6.6 % — HIGH (ref 4–5.6)
ANION GAP SERPL CALC-SCNC: 13 MMOL/L — SIGNIFICANT CHANGE UP (ref 7–14)
ANION GAP SERPL CALC-SCNC: 16 MMOL/L — HIGH (ref 7–14)
BUN SERPL-MCNC: 22 MG/DL — HIGH (ref 10–20)
BUN SERPL-MCNC: 25 MG/DL — HIGH (ref 10–20)
CALCIUM SERPL-MCNC: 9.1 MG/DL — SIGNIFICANT CHANGE UP (ref 8.5–10.1)
CALCIUM SERPL-MCNC: 9.3 MG/DL — SIGNIFICANT CHANGE UP (ref 8.5–10.1)
CHLORIDE SERPL-SCNC: 102 MMOL/L — SIGNIFICANT CHANGE UP (ref 98–110)
CHLORIDE SERPL-SCNC: 105 MMOL/L — SIGNIFICANT CHANGE UP (ref 98–110)
CO2 SERPL-SCNC: 20 MMOL/L — SIGNIFICANT CHANGE UP (ref 17–32)
CO2 SERPL-SCNC: 26 MMOL/L — SIGNIFICANT CHANGE UP (ref 17–32)
CREAT SERPL-MCNC: 0.8 MG/DL — SIGNIFICANT CHANGE UP (ref 0.7–1.5)
CREAT SERPL-MCNC: 0.8 MG/DL — SIGNIFICANT CHANGE UP (ref 0.7–1.5)
ESTIMATED AVERAGE GLUCOSE: 140 MG/DL — HIGH (ref 68–114)
ESTIMATED AVERAGE GLUCOSE: 143 MG/DL — HIGH (ref 68–114)
GLUCOSE BLDC GLUCOMTR-MCNC: 107 MG/DL — HIGH (ref 70–99)
GLUCOSE BLDC GLUCOMTR-MCNC: 118 MG/DL — HIGH (ref 70–99)
GLUCOSE BLDC GLUCOMTR-MCNC: 297 MG/DL — HIGH (ref 70–99)
GLUCOSE BLDC GLUCOMTR-MCNC: 302 MG/DL — HIGH (ref 70–99)
GLUCOSE BLDC GLUCOMTR-MCNC: 331 MG/DL — HIGH (ref 70–99)
GLUCOSE SERPL-MCNC: 119 MG/DL — HIGH (ref 70–99)
GLUCOSE SERPL-MCNC: 386 MG/DL — HIGH (ref 70–99)
HCV RNA FLD QL NAA+PROBE: SIGNIFICANT CHANGE UP
HCV RNA SPEC QL PROBE+SIG AMP: DETECTED
MAGNESIUM SERPL-MCNC: 2.1 MG/DL — SIGNIFICANT CHANGE UP (ref 1.8–2.4)
PHOSPHATE SERPL-MCNC: 3.2 MG/DL — SIGNIFICANT CHANGE UP (ref 2.1–4.9)
POTASSIUM SERPL-MCNC: 3.8 MMOL/L — SIGNIFICANT CHANGE UP (ref 3.5–5)
POTASSIUM SERPL-MCNC: 3.9 MMOL/L — SIGNIFICANT CHANGE UP (ref 3.5–5)
POTASSIUM SERPL-SCNC: 3.8 MMOL/L — SIGNIFICANT CHANGE UP (ref 3.5–5)
POTASSIUM SERPL-SCNC: 3.9 MMOL/L — SIGNIFICANT CHANGE UP (ref 3.5–5)
SARS-COV-2 RNA SPEC QL NAA+PROBE: SIGNIFICANT CHANGE UP
SODIUM SERPL-SCNC: 141 MMOL/L — SIGNIFICANT CHANGE UP (ref 135–146)
SODIUM SERPL-SCNC: 141 MMOL/L — SIGNIFICANT CHANGE UP (ref 135–146)

## 2020-07-09 PROCEDURE — 99232 SBSQ HOSP IP/OBS MODERATE 35: CPT

## 2020-07-09 RX ORDER — SODIUM CHLORIDE 9 MG/ML
500 INJECTION, SOLUTION INTRAVENOUS ONCE
Refills: 0 | Status: COMPLETED | OUTPATIENT
Start: 2020-07-09 | End: 2020-07-09

## 2020-07-09 RX ORDER — INSULIN LISPRO 100/ML
5 VIAL (ML) SUBCUTANEOUS
Refills: 0 | Status: DISCONTINUED | OUTPATIENT
Start: 2020-07-09 | End: 2020-07-09

## 2020-07-09 RX ORDER — INSULIN LISPRO 100/ML
6 VIAL (ML) SUBCUTANEOUS ONCE
Refills: 0 | Status: COMPLETED | OUTPATIENT
Start: 2020-07-09 | End: 2020-07-09

## 2020-07-09 RX ORDER — INSULIN GLARGINE 100 [IU]/ML
10 INJECTION, SOLUTION SUBCUTANEOUS EVERY MORNING
Refills: 0 | Status: DISCONTINUED | OUTPATIENT
Start: 2020-07-10 | End: 2020-07-13

## 2020-07-09 RX ORDER — INSULIN LISPRO 100/ML
10 VIAL (ML) SUBCUTANEOUS
Refills: 0 | Status: DISCONTINUED | OUTPATIENT
Start: 2020-07-09 | End: 2020-07-13

## 2020-07-09 RX ORDER — INSULIN GLARGINE 100 [IU]/ML
5 INJECTION, SOLUTION SUBCUTANEOUS EVERY MORNING
Refills: 0 | Status: DISCONTINUED | OUTPATIENT
Start: 2020-07-09 | End: 2020-07-09

## 2020-07-09 RX ORDER — INSULIN LISPRO 100/ML
3 VIAL (ML) SUBCUTANEOUS
Refills: 0 | Status: DISCONTINUED | OUTPATIENT
Start: 2020-07-09 | End: 2020-07-09

## 2020-07-09 RX ADMIN — Medication 650 MILLIGRAM(S): at 17:00

## 2020-07-09 RX ADMIN — Medication 6 UNIT(S): at 12:10

## 2020-07-09 RX ADMIN — LEVETIRACETAM 500 MILLIGRAM(S): 250 TABLET, FILM COATED ORAL at 17:01

## 2020-07-09 RX ADMIN — SENNA PLUS 2 TABLET(S): 8.6 TABLET ORAL at 21:38

## 2020-07-09 RX ADMIN — SODIUM CHLORIDE 500 MILLILITER(S): 9 INJECTION, SOLUTION INTRAVENOUS at 06:07

## 2020-07-09 RX ADMIN — Medication 650 MILLIGRAM(S): at 23:18

## 2020-07-09 RX ADMIN — Medication 6: at 07:47

## 2020-07-09 RX ADMIN — Medication 5 UNIT(S): at 12:12

## 2020-07-09 RX ADMIN — RISPERIDONE 0.5 MILLIGRAM(S): 4 TABLET ORAL at 05:11

## 2020-07-09 RX ADMIN — LEVETIRACETAM 500 MILLIGRAM(S): 250 TABLET, FILM COATED ORAL at 05:11

## 2020-07-09 RX ADMIN — Medication 8: at 12:09

## 2020-07-09 RX ADMIN — HEPARIN SODIUM 5000 UNIT(S): 5000 INJECTION INTRAVENOUS; SUBCUTANEOUS at 05:10

## 2020-07-09 RX ADMIN — INSULIN GLARGINE 5 UNIT(S): 100 INJECTION, SOLUTION SUBCUTANEOUS at 12:09

## 2020-07-09 RX ADMIN — PANTOPRAZOLE SODIUM 40 MILLIGRAM(S): 20 TABLET, DELAYED RELEASE ORAL at 05:11

## 2020-07-09 RX ADMIN — HEPARIN SODIUM 5000 UNIT(S): 5000 INJECTION INTRAVENOUS; SUBCUTANEOUS at 21:38

## 2020-07-09 RX ADMIN — Medication 10 UNIT(S): at 17:01

## 2020-07-09 RX ADMIN — Medication 650 MILLIGRAM(S): at 11:52

## 2020-07-09 RX ADMIN — RISPERIDONE 0.5 MILLIGRAM(S): 4 TABLET ORAL at 17:01

## 2020-07-09 RX ADMIN — Medication 650 MILLIGRAM(S): at 05:11
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
## 2020-07-09 NOTE — CHART NOTE - NSCHARTNOTEFT_GEN_A_CORE
Trauma service has contacted medical team to obtain recommendations regarding optimization of diabetes and hyperglycemia  CAPILLARY BLOOD GLUCOSE      POCT Blood Glucose.: 297 mg/dL (09 Jul 2020 07:10)  POCT Blood Glucose.: 282 mg/dL (08 Jul 2020 20:55)  POCT Blood Glucose.: 346 mg/dL (08 Jul 2020 16:37)  POCT Blood Glucose.: 378 mg/dL (08 Jul 2020 11:21)      - Hga1c level- 6.3    -please start patient on Lantus 5 units subc once daily in the morning, 1st dose today  - Lispro insulin 3 units subc. three times daily with meals  -continue moderate sliding scale insulin coverage as needed  -bsfs monitoring every 6 hours    Mild JENI- encourage moderate fluid po intake, monitor BMP - repeat today's BMP Trauma service has contacted medical team to obtain recommendations regarding optimization of diabetes and hyperglycemia  CAPILLARY BLOOD GLUCOSE    CAPILLARY BLOOD GLUCOSE      POCT Blood Glucose.: 302 mg/dL (09 Jul 2020 12:08)  POCT Blood Glucose.: 331 mg/dL (09 Jul 2020 10:25)  POCT Blood Glucose.: 297 mg/dL (09 Jul 2020 07:10)  POCT Blood Glucose.: 282 mg/dL (08 Jul 2020 20:55)  POCT Blood Glucose.: 346 mg/dL (08 Jul 2020 16:37)        - Hga1c level- 6.3    - patient was started on Lantus 5 units subc once daily in the morning, 1st dose today, increase to 10 units subc. once daily.  - Lispro insulin 3 units subc. three times daily with meals- due to persistent hyperglycemia- increase lispro to 10 units subc three times daily before meals  -continue moderate sliding scale insulin coverage as needed  -bsfs monitoring every 6 hours    Mild JENI- encourage moderate fluid po intake, monitor BMP - repeated BMP  07-09    141  |  102  |  22<H>  ----------------------------<  386<H>  3.8   |  26  |  0.8    Ca    9.1      09 Jul 2020 09:01  Phos  2.6     07-08  Mg     2.1     07-08    Patient will need close outpatient  PCP f/up for further Diabetic management. Please provide to patient education on diabetes during inpatient stay.

## 2020-07-09 NOTE — PROGRESS NOTE ADULT - ATTENDING COMMENTS
Patient looks comfortable, afebrile.  Has elevated Glu yesterday and this am.  Neurology f/u appreciated - will continue Keppra.    Assessment/Plan:  - use TLSO brace  - PT  - incentive spirometer  - DVT prophylaxis  - Medical evaluation for uncontrolled DM and possible transfer of service as needed.
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
## 2020-07-09 NOTE — PROGRESS NOTE ADULT - SUBJECTIVE AND OBJECTIVE BOX
GENERAL SURGERY PROGRESS NOTE     FABIOLA PORTILLO  [address] [place] [address] day :4d  Surgical Attending: Daryl Cui      Overnight events:  still having hyperglycemia without hx of DM. sugars in 300s, pt on sliding scale, medicine was consulted, dr anand. recommended repeat stat BMP s/p 500mL bolus of LR. Dr. Anand will adjust insulin dose. pt also have tachycardia to 131 and cr of 1.1     Patient seen and examined at bedside. pt in no acute distress with no acute complaints      T(F): 98.3 (07-09-20 @ 05:48), Max: 99.3 (07-08-20 @ 13:22)  HR: 130 (07-09-20 @ 05:48) (130 - 135)  BP: 131/99 (07-09-20 @ 05:48) (110/79 - 131/99)  ABP: --  ABP(mean): --  RR: 20 (07-09-20 @ 05:48) (18 - 20)  SpO2: --      07-08-20 @ 07:01  -  07-09-20 @ 07:00  --------------------------------------------------------  IN:  Total IN: 0 mL    OUT:    Voided: 753 mL  Total OUT: 753 mL    Total NET: -753 mL      DIET/FLUIDS: dextrose 5%. 1000 milliLiter(s) IV Continuous <Continuous>         GI proph:  pantoprazole    Tablet 40 milliGRAM(s) Oral before breakfast    AC/ proph: heparin   Injectable 5000 Unit(s) SubCutaneous every 8 hours      PHYSICAL EXAM:  GENERAL: NAD, well-appearing. ecchymosis over the left orbit since admission, improving. altered mental status at baseline secondary to TBI   CHEST/LUNG: Clear to auscultation bilaterally  HEART: Regular rate and rhythm  ABDOMEN: Soft, Nontender, Nondistended;   EXTREMITIES:  No clubbing, cyanosis, or edema      LABS  Labs:  CAPILLARY BLOOD GLUCOSE  POCT Blood Glucose.: 297 mg/dL (09 Jul 2020 07:10)  POCT Blood Glucose.: 282 mg/dL (08 Jul 2020 20:55)  POCT Blood Glucose.: 346 mg/dL (08 Jul 2020 16:37)  POCT Blood Glucose.: 378 mg/dL (08 Jul 2020 11:21)                          13.6   7.12  )-----------( 145      ( 08 Jul 2020 22:24 )             40.3       Auto Neutrophil %: 71.4 % (07-08-20 @ 22:24)  Auto Immature Granulocyte %: 0.4 % (07-08-20 @ 22:24)    07-09    141  |  102  |  22<H>  ----------------------------<  386<H>  3.8   |  26  |  0.8      Calcium, Total Serum: 9.1 mg/dL (07-09-20 @ 09:01)

## 2020-07-09 NOTE — PROGRESS NOTE ADULT - ASSESSMENT
A/P:  FABIOLA PORTILLO is a 60yMale HD4. [place] witnessed. borught in by EMS. pt with lumbar fx's and rib fx and forehead lac that was repaired    Plan:   - pt had elevated Cr at 1.1 from 0.7, repeat BMP 9am showed Cr 0.8  - pt having increased Glucoses in the 300s. medicine was consulted, spoke with Dr. Song. She is going to make adjustments to patient's glucose medications. can still plan for D/C pt will need close follow up with PCP upon d/c  - encourage TLSO brace   - OOB  - encourage IS us  - f/u vitals  - f/u labs & replete if necessary  - DVT PPX  - GI PPX  - Speak w/ SW/CM  - Begin dispo planning for D/C

## 2020-07-09 NOTE — CHART NOTE - NSCHARTNOTEFT_GEN_A_CORE
Consult placed to hospitalist for management of hyperglycemia & chronic pain med requirements. At this point in the patient's hospital course, the management of his traumatic injuries are resolved. He does however still require medical management of current issues. Issues discussed with Dr. Song and she agrees to accept the patient. Will transfer patient to medicine for further management.

## 2020-07-10 ENCOUNTER — APPOINTMENT (OUTPATIENT)
Dept: NEUROLOGY | Facility: CLINIC | Age: 60
End: 2020-07-10

## 2020-07-10 LAB
ALBUMIN SERPL ELPH-MCNC: 3.4 G/DL — LOW (ref 3.5–5.2)
ALP SERPL-CCNC: 52 U/L — SIGNIFICANT CHANGE UP (ref 30–115)
ALT FLD-CCNC: 79 U/L — HIGH (ref 0–41)
ANION GAP SERPL CALC-SCNC: 11 MMOL/L — SIGNIFICANT CHANGE UP (ref 7–14)
AST SERPL-CCNC: 44 U/L — HIGH (ref 0–41)
BILIRUB DIRECT SERPL-MCNC: <0.2 MG/DL — SIGNIFICANT CHANGE UP (ref 0–0.2)
BILIRUB INDIRECT FLD-MCNC: >0.3 MG/DL — SIGNIFICANT CHANGE UP (ref 0.2–1.2)
BILIRUB SERPL-MCNC: 0.5 MG/DL — SIGNIFICANT CHANGE UP (ref 0.2–1.2)
BUN SERPL-MCNC: 23 MG/DL — HIGH (ref 10–20)
CALCIUM SERPL-MCNC: 8.5 MG/DL — SIGNIFICANT CHANGE UP (ref 8.5–10.1)
CHLORIDE SERPL-SCNC: 108 MMOL/L — SIGNIFICANT CHANGE UP (ref 98–110)
CK SERPL-CCNC: 114 U/L — SIGNIFICANT CHANGE UP (ref 0–225)
CO2 SERPL-SCNC: 24 MMOL/L — SIGNIFICANT CHANGE UP (ref 17–32)
CREAT SERPL-MCNC: 0.9 MG/DL — SIGNIFICANT CHANGE UP (ref 0.7–1.5)
GLUCOSE BLDC GLUCOMTR-MCNC: 145 MG/DL — HIGH (ref 70–99)
GLUCOSE BLDC GLUCOMTR-MCNC: 165 MG/DL — HIGH (ref 70–99)
GLUCOSE BLDC GLUCOMTR-MCNC: 245 MG/DL — HIGH (ref 70–99)
GLUCOSE BLDC GLUCOMTR-MCNC: 269 MG/DL — HIGH (ref 70–99)
GLUCOSE SERPL-MCNC: 168 MG/DL — HIGH (ref 70–99)
HCT VFR BLD CALC: 35 % — LOW (ref 42–52)
HGB BLD-MCNC: 11.6 G/DL — LOW (ref 14–18)
MAGNESIUM SERPL-MCNC: 1.9 MG/DL — SIGNIFICANT CHANGE UP (ref 1.8–2.4)
MCHC RBC-ENTMCNC: 30.4 PG — SIGNIFICANT CHANGE UP (ref 27–31)
MCHC RBC-ENTMCNC: 33.1 G/DL — SIGNIFICANT CHANGE UP (ref 32–37)
MCV RBC AUTO: 91.9 FL — SIGNIFICANT CHANGE UP (ref 80–94)
NRBC # BLD: 0 /100 WBCS — SIGNIFICANT CHANGE UP (ref 0–0)
PHOSPHATE SERPL-MCNC: 3.5 MG/DL — SIGNIFICANT CHANGE UP (ref 2.1–4.9)
PLATELET # BLD AUTO: 154 K/UL — SIGNIFICANT CHANGE UP (ref 130–400)
POTASSIUM SERPL-MCNC: 4.1 MMOL/L — SIGNIFICANT CHANGE UP (ref 3.5–5)
POTASSIUM SERPL-SCNC: 4.1 MMOL/L — SIGNIFICANT CHANGE UP (ref 3.5–5)
PROT SERPL-MCNC: 5.7 G/DL — LOW (ref 6–8)
RBC # BLD: 3.81 M/UL — LOW (ref 4.7–6.1)
RBC # FLD: 13.2 % — SIGNIFICANT CHANGE UP (ref 11.5–14.5)
SODIUM SERPL-SCNC: 143 MMOL/L — SIGNIFICANT CHANGE UP (ref 135–146)
WBC # BLD: 7.15 K/UL — SIGNIFICANT CHANGE UP (ref 4.8–10.8)
WBC # FLD AUTO: 7.15 K/UL — SIGNIFICANT CHANGE UP (ref 4.8–10.8)

## 2020-07-10 PROCEDURE — 99233 SBSQ HOSP IP/OBS HIGH 50: CPT

## 2020-07-10 PROCEDURE — 93010 ELECTROCARDIOGRAM REPORT: CPT

## 2020-07-10 RX ORDER — SODIUM CHLORIDE 9 MG/ML
1000 INJECTION INTRAMUSCULAR; INTRAVENOUS; SUBCUTANEOUS
Refills: 0 | Status: DISCONTINUED | OUTPATIENT
Start: 2020-07-10 | End: 2020-07-11

## 2020-07-10 RX ORDER — OXYCODONE HYDROCHLORIDE 5 MG/1
10 TABLET ORAL EVERY 6 HOURS
Refills: 0 | Status: DISCONTINUED | OUTPATIENT
Start: 2020-07-10 | End: 2020-07-13

## 2020-07-10 RX ADMIN — Medication 650 MILLIGRAM(S): at 12:47

## 2020-07-10 RX ADMIN — Medication 10 UNIT(S): at 17:14

## 2020-07-10 RX ADMIN — SENNA PLUS 2 TABLET(S): 8.6 TABLET ORAL at 21:38

## 2020-07-10 RX ADMIN — LEVETIRACETAM 500 MILLIGRAM(S): 250 TABLET, FILM COATED ORAL at 17:55

## 2020-07-10 RX ADMIN — Medication 10 UNIT(S): at 12:15

## 2020-07-10 RX ADMIN — RISPERIDONE 0.5 MILLIGRAM(S): 4 TABLET ORAL at 06:35

## 2020-07-10 RX ADMIN — HEPARIN SODIUM 5000 UNIT(S): 5000 INJECTION INTRAVENOUS; SUBCUTANEOUS at 21:40

## 2020-07-10 RX ADMIN — Medication 10 UNIT(S): at 08:04

## 2020-07-10 RX ADMIN — LEVETIRACETAM 500 MILLIGRAM(S): 250 TABLET, FILM COATED ORAL at 06:35

## 2020-07-10 RX ADMIN — Medication 4: at 08:03

## 2020-07-10 RX ADMIN — HEPARIN SODIUM 5000 UNIT(S): 5000 INJECTION INTRAVENOUS; SUBCUTANEOUS at 06:34

## 2020-07-10 RX ADMIN — Medication 650 MILLIGRAM(S): at 06:35

## 2020-07-10 RX ADMIN — Medication 6: at 12:10

## 2020-07-10 RX ADMIN — SODIUM CHLORIDE 100 MILLILITER(S): 9 INJECTION INTRAMUSCULAR; INTRAVENOUS; SUBCUTANEOUS at 20:00

## 2020-07-10 RX ADMIN — Medication 650 MILLIGRAM(S): at 17:55

## 2020-07-10 RX ADMIN — PANTOPRAZOLE SODIUM 40 MILLIGRAM(S): 20 TABLET, DELAYED RELEASE ORAL at 06:35

## 2020-07-10 RX ADMIN — OXYCODONE HYDROCHLORIDE 10 MILLIGRAM(S): 5 TABLET ORAL at 21:39

## 2020-07-10 RX ADMIN — HEPARIN SODIUM 5000 UNIT(S): 5000 INJECTION INTRAVENOUS; SUBCUTANEOUS at 13:41

## 2020-07-10 RX ADMIN — INSULIN GLARGINE 10 UNIT(S): 100 INJECTION, SOLUTION SUBCUTANEOUS at 08:05

## 2020-07-10 RX ADMIN — Medication 650 MILLIGRAM(S): at 23:28

## 2020-07-10 RX ADMIN — RISPERIDONE 0.5 MILLIGRAM(S): 4 TABLET ORAL at 17:55

## 2020-07-10 NOTE — PROGRESS NOTE ADULT - SUBJECTIVE AND OBJECTIVE BOX
HPI:60 year old male with PMHx of TBI, anxiety, epilepsy on home keppra, presents to the ED brought in by EMS, found down with +HT, +LOC, ?AC next to train tracks. _Patient found to have +ETOH on breath and witnessed seizure by EMS en route to hospital. Patient was postictal with urinary incontinence following seizure. Patient unable to provider further information at this time due to altered mental status. On exam patient is noted to have laceration overlying left eyebrow.      No taumatic injuries except for laceration that has been repaired. Patient cleared by trauma surgery, no further traumatic workup indicated. Pt being transferred to medicine, is now pending placement (methadone user). As per social rounds, patient pneding placement for Monday, needs to be anticipated Sunday night with a COVID swab.      PAST MEDICAL & SURGICAL HISTORY:  TBI (traumatic brain injury)  Anxiety  Brain aneurysm  Seizure  No significant past surgical history    Allergies: No Known Allergies  Intolerances        Vital Signs Last 24 Hrs  T(C): 36.3 (06 Jul 2020 03:55), Max: 36.3 (06 Jul 2020 03:55)  T(F): 97.3 (06 Jul 2020 03:55), Max: 97.3 (06 Jul 2020 03:55)  HR: 88 (06 Jul 2020 03:55) (88 - 88)  BP: 144/85 (06 Jul 2020 03:55) (144/85 - 144/85)  BP(mean): --  RR: 18 (06 Jul 2020 03:55) (18 - 18)  SpO2: 100% (06 Jul 2020 03:55) (100% - 100%) (06 Jul 2020 10:58)    VITAL SIGNS: Afebrile vital signs stable  CONSTITUTIONAL: Well-developed; well-nourished; in no acute distress.  SKIN: Skin exam is warm and dry, no acute rash.  HEAD: laceration on right eye/face.   NECK: Supple; non tender. No rigidity  CARD: Regular rate and rhythm. Normal S1, S2; no murmurs, gallops, or rubs.  RESP: Lungs clear to auscultation bilaterally. No wheezes, rales or rhonchi.  ABD: Abdomen soft; non-tender; slightly distension  ROM: not assessed, patient sitting in chair.                     NEURO: Alert and oriented x 1

## 2020-07-10 NOTE — PROGRESS NOTE ADULT - ATTENDING COMMENTS
Patient seen and examined independently earlier today. Case discussed with housestaff, nursing, social work. I agree with most of the resident's note, physical exam, and plan except as below. Patient feels well. No complaints. Denies CP, SOB, AP. Remainder ROS unremarkable.    Gen: NAD, AA0x2  CV: nl S1 S2  Resp: decreased BS b/l  GI: NT/ND/S +BS  MS: no c/c/e  Neuro: Rt 3+/5, Lt 5/5    #Traumatic Lumbar transverse process, vertebral body fractures/Rib Fractures/H/o TBI, ruptured Brain aneurysm/Seizures/Polysubstance abuse and dependance/DM w/ Hyperglycemia/Rhabdo  -adjust Insulin  -need to find out Methadone dose from clinic. For now change from IV Morphine to PO.  -repeat labs  -IVFs for now  -replete lytes  -Keppra    #Progress Note Handoff  Pending (specify):  Consults____Clinical improvement and stability_x___Tests_____, PT_____x___  Pt/Family discussion: Pt informed and agrees with the current plan  Disposition: Home______SNF___x____To be determined________ Patient seen and examined independently earlier today. Case discussed with housestaff, nursing, social work. I agree with most of the resident's note, physical exam, and plan except as below. Patient feels well. No complaints. Denies CP, SOB, AP. Remainder ROS unremarkable.    Gen: NAD, AA0x2  CV: nl S1 S2  Resp: decreased BS b/l  GI: NT/ND/S +BS  MS: no c/c/e  Neuro: Rt 3+/5, Lt 5/5    #Traumatic Lumbar transverse process, vertebral body fractures/Rib Fractures/H/o TBI, ruptured Brain aneurysm/Seizures/Polysubstance abuse and dependance/DM w/ Hyperglycemia/Rhabdo  -adjust Insulin  -need to find out Methadone dose from clinic. For now change from IV Morphine to PO.  -repeat labs  -IVFs for now  -replete lytes  -Keppra  -monitor CIWA    #Progress Note Handoff  Pending (specify):  Consults____Clinical improvement and stability_x___Tests_____, PT_____x___  Pt/Family discussion: Pt informed and agrees with the current plan  Disposition: Home______SNF___x____To be determined________

## 2020-07-10 NOTE — PROGRESS NOTE ADULT - ASSESSMENT
61 y/o M methadone user no PMH was [place] [employer]. S/p lac repair eye, transferred from trauma surgery service to medical service for hyperglycemia and social issues    #Trauma in setting methadone user  -no further trauma workup indicated  -fall precautions  encourage TLSO    #Hyperglycemia  -insulin regimem  -f/u FS    #Diet-carb consistent   #DISPO-SNF on Monday, needs COVID swab Sunday and to be anticipated for d/c.

## 2020-07-10 NOTE — CHART NOTE - NSCHARTNOTEFT_GEN_A_CORE
Pt transitioned to PO meds for opioids dependence. Please refrain from IV opiates. Primary team attempting to find out methadone dose

## 2020-07-11 LAB
GLUCOSE BLDC GLUCOMTR-MCNC: 193 MG/DL — HIGH (ref 70–99)
GLUCOSE BLDC GLUCOMTR-MCNC: 208 MG/DL — HIGH (ref 70–99)
GLUCOSE BLDC GLUCOMTR-MCNC: 228 MG/DL — HIGH (ref 70–99)
GLUCOSE BLDC GLUCOMTR-MCNC: 228 MG/DL — HIGH (ref 70–99)
GLUCOSE BLDC GLUCOMTR-MCNC: 269 MG/DL — HIGH (ref 70–99)
TSH SERPL-MCNC: 1.01 UIU/ML — SIGNIFICANT CHANGE UP (ref 0.27–4.2)

## 2020-07-11 PROCEDURE — 99232 SBSQ HOSP IP/OBS MODERATE 35: CPT

## 2020-07-11 RX ADMIN — Medication 2: at 08:17

## 2020-07-11 RX ADMIN — HEPARIN SODIUM 5000 UNIT(S): 5000 INJECTION INTRAVENOUS; SUBCUTANEOUS at 05:35

## 2020-07-11 RX ADMIN — Medication 650 MILLIGRAM(S): at 05:35

## 2020-07-11 RX ADMIN — LEVETIRACETAM 500 MILLIGRAM(S): 250 TABLET, FILM COATED ORAL at 05:35

## 2020-07-11 RX ADMIN — OXYCODONE HYDROCHLORIDE 10 MILLIGRAM(S): 5 TABLET ORAL at 05:40

## 2020-07-11 RX ADMIN — RISPERIDONE 0.5 MILLIGRAM(S): 4 TABLET ORAL at 05:35

## 2020-07-11 RX ADMIN — INSULIN GLARGINE 10 UNIT(S): 100 INJECTION, SOLUTION SUBCUTANEOUS at 10:53

## 2020-07-11 RX ADMIN — PANTOPRAZOLE SODIUM 40 MILLIGRAM(S): 20 TABLET, DELAYED RELEASE ORAL at 06:03

## 2020-07-11 RX ADMIN — RISPERIDONE 0.5 MILLIGRAM(S): 4 TABLET ORAL at 17:29

## 2020-07-11 RX ADMIN — Medication 650 MILLIGRAM(S): at 23:02

## 2020-07-11 RX ADMIN — HEPARIN SODIUM 5000 UNIT(S): 5000 INJECTION INTRAVENOUS; SUBCUTANEOUS at 13:26

## 2020-07-11 RX ADMIN — LEVETIRACETAM 500 MILLIGRAM(S): 250 TABLET, FILM COATED ORAL at 17:29

## 2020-07-11 RX ADMIN — Medication 650 MILLIGRAM(S): at 17:29

## 2020-07-11 RX ADMIN — Medication 6: at 17:27

## 2020-07-11 RX ADMIN — Medication 10 UNIT(S): at 17:28

## 2020-07-11 RX ADMIN — Medication 10 UNIT(S): at 08:17

## 2020-07-11 RX ADMIN — Medication 10 UNIT(S): at 12:07

## 2020-07-11 RX ADMIN — Medication 650 MILLIGRAM(S): at 12:08

## 2020-07-11 RX ADMIN — Medication 4: at 12:07

## 2020-07-11 RX ADMIN — HEPARIN SODIUM 5000 UNIT(S): 5000 INJECTION INTRAVENOUS; SUBCUTANEOUS at 21:01

## 2020-07-11 NOTE — PROGRESS NOTE ADULT - ATTENDING COMMENTS
Patient seen and examined independently earlier today. Case discussed with housestaff, nursing, social work. I agree with most of the resident's note, physical exam, and plan except as below. Patient feels well. No complaints. Denies CP, SOB, AP. Remainder ROS unremarkable.    Gen: NAD, AA0x2  CV: nl S1 S2  Resp: decreased BS b/l  GI: NT/ND/S +BS  MS: no c/c/e  Neuro: RUE 3/5, RLE 4/5, Lt 5/5    #Traumatic Lumbar transverse process, vertebral body fractures/Rib Fractures/H/o TBI, ruptured Brain aneurysm/Seizures/Polysubstance abuse and dependance/DM w/ Hyperglycemia/Rhabdo/QTc prolongation  -adjusted Insulin  -need to find out Methadone dose from clinic. For now changed from IV Morphine to PO and pt tolerating well.  -in view of prolonged baseline QTc, pt probably not a good Methadone candidate  -Keppra  -monitor CIWA    #Progress Note Handoff  Pending (specify):  Consults____Clinical improvement and stability_x___Tests_____, PT_____x___  Pt/Family discussion: Pt informed and agrees with the current plan  Disposition: Home______SNF___x____To be determined________ .    35 minutes spent on total encounter; more than 50% of the visit was spent counseling and/or coordinating care by the attending physician.

## 2020-07-11 NOTE — PROGRESS NOTE ADULT - SUBJECTIVE AND OBJECTIVE BOX
SUBJECTIVE:    Patient is a 60y old Male who presents with a chief complaint of found down with +HT, +LOC, ?AC (10 Jul 2020 13:39)  Currently admitted to medicine with the primary diagnosis of Fracture of transverse process of lumbar vertebra  Today is hospital day 5d. This morning he is resting comfortably in bed and reports no new issues or overnight events.   Pt was not agitated at time of my exam, is tolerating PO opioids well    PAST MEDICAL & SURGICAL HISTORY  TBI (traumatic brain injury)  Anxiety  Brain aneurysm  Seizure  No significant past surgical history      ALLERGIES:  No Known Allergies    MEDICATIONS:  STANDING MEDICATIONS  acetaminophen   Tablet .. 650 milliGRAM(s) Oral every 6 hours  heparin   Injectable 5000 Unit(s) SubCutaneous every 8 hours  insulin glargine Injectable (LANTUS) 10 Unit(s) SubCutaneous every morning  insulin lispro (HumaLOG) corrective regimen sliding scale   SubCutaneous three times a day before meals  insulin lispro Injectable (HumaLOG) 10 Unit(s) SubCutaneous three times a day before meals  levETIRAcetam 500 milliGRAM(s) Oral two times a day  nicotine -   7 mG/24Hr(s) Patch 1 patch Transdermal daily  pantoprazole    Tablet 40 milliGRAM(s) Oral before breakfast  risperiDONE   Tablet 0.5 milliGRAM(s) Oral two times a day  senna 2 Tablet(s) Oral at bedtime  sodium chloride 0.9%. 1000 milliLiter(s) IV Continuous <Continuous>    PRN MEDICATIONS  ondansetron Injectable 4 milliGRAM(s) IV Push every 6 hours PRN  oxyCODONE    IR 10 milliGRAM(s) Oral every 6 hours PRN    VITALS:   T(F): 97.9  HR: 97  BP: 177/96  RR: 19    LABS:                        11.6   7.15  )-----------( 154      ( 10 Jul 2020 21:12 )             35.0     07-10    143  |  108  |  23<H>  ----------------------------<  168<H>  4.1   |  24  |  0.9    Ca    8.5      10 Jul 2020 21:12  Phos  3.5     07-10  Mg     1.9     07-10    TPro  5.7<L>  /  Alb  3.4<L>  /  TBili  0.5  /  DBili  <0.2  /  AST  44<H>  /  ALT  79<H>  /  AlkPhos  52  07-10      Creatine Kinase, Serum: 114 U/L (07-10-20 @ 21:12)      CARDIAC MARKERS ( 10 Jul 2020 21:12 )  x     / x     / 114 U/L / x     / x        PHYSICAL EXAM:  GEN: No acute distress, seen resting comfortably in bed. On 1:1 sit because patient tries to leave room  HEENT: bruising face noted, skin laceration note above eye  LUNGS: Clear to auscultation bilaterally   HEART: S1/S2 present. RRR.   ABD: Soft, non-tender, non-distended.   EXT: no edema  NEURO: AAOX1  PSYCH: calm, not agitated

## 2020-07-11 NOTE — CHART NOTE - NSCHARTNOTEFT_GEN_A_CORE
Left message with methadone clinic again today; still unaware of methadone dose at this time. QTc 498 on EKG yesterday.     Patient needs COVID swab Sunday 7/12 and needs to be anticipated for d/c 7/12

## 2020-07-11 NOTE — PROGRESS NOTE ADULT - ASSESSMENT
61 y/o M methadone user no PMH was [place] with [employer]. S/p lac repair eye, transferred from trauma surgery service to medical service for hyperglycemia and social issues    #Trauma in setting methadone user  -no further trauma workup indicated  -fall precautions  -encourage TLSO    #Hyperglycemia  -insulin regimem  -f/u FS    #Diet-carb consistent   #DISPO-SNF on Monday, needs COVID swab Sunday and to be anticipated for d/c.

## 2020-07-12 LAB
GLUCOSE BLDC GLUCOMTR-MCNC: 189 MG/DL — HIGH (ref 70–99)
GLUCOSE BLDC GLUCOMTR-MCNC: 209 MG/DL — HIGH (ref 70–99)
GLUCOSE BLDC GLUCOMTR-MCNC: 213 MG/DL — HIGH (ref 70–99)
GLUCOSE BLDC GLUCOMTR-MCNC: 314 MG/DL — HIGH (ref 70–99)
SARS-COV-2 RNA SPEC QL NAA+PROBE: SIGNIFICANT CHANGE UP

## 2020-07-12 PROCEDURE — 99232 SBSQ HOSP IP/OBS MODERATE 35: CPT

## 2020-07-12 RX ADMIN — HEPARIN SODIUM 5000 UNIT(S): 5000 INJECTION INTRAVENOUS; SUBCUTANEOUS at 05:30

## 2020-07-12 RX ADMIN — Medication 650 MILLIGRAM(S): at 12:15

## 2020-07-12 RX ADMIN — HEPARIN SODIUM 5000 UNIT(S): 5000 INJECTION INTRAVENOUS; SUBCUTANEOUS at 21:39

## 2020-07-12 RX ADMIN — RISPERIDONE 0.5 MILLIGRAM(S): 4 TABLET ORAL at 17:36

## 2020-07-12 RX ADMIN — Medication 650 MILLIGRAM(S): at 05:31

## 2020-07-12 RX ADMIN — LEVETIRACETAM 500 MILLIGRAM(S): 250 TABLET, FILM COATED ORAL at 17:36

## 2020-07-12 RX ADMIN — HEPARIN SODIUM 5000 UNIT(S): 5000 INJECTION INTRAVENOUS; SUBCUTANEOUS at 12:18

## 2020-07-12 RX ADMIN — Medication 650 MILLIGRAM(S): at 17:35

## 2020-07-12 RX ADMIN — RISPERIDONE 0.5 MILLIGRAM(S): 4 TABLET ORAL at 05:31

## 2020-07-12 RX ADMIN — SENNA PLUS 2 TABLET(S): 8.6 TABLET ORAL at 21:39

## 2020-07-12 RX ADMIN — LEVETIRACETAM 500 MILLIGRAM(S): 250 TABLET, FILM COATED ORAL at 05:31

## 2020-07-12 RX ADMIN — PANTOPRAZOLE SODIUM 40 MILLIGRAM(S): 20 TABLET, DELAYED RELEASE ORAL at 05:31

## 2020-07-12 NOTE — PROGRESS NOTE ADULT - ASSESSMENT
#Traumatic Lumbar transverse process, Vertebral body fractures/Rib Fractures/H/o TBI, ruptured Brain aneurysm/Seizures/Polysubstance abuse and dependance/DM w/ Hyperglycemia/Rhabdo/QTc prolongation  -adjusted Insulin  -in view of prolonged baseline QTc, pt probably not a good Methadone candidate  -Keppra  -monitor CIWA    #Progress Note Handoff  Pending (specify):  SNF placement  Pt/Family discussion: Pt informed and agrees with the current plan  Disposition: Home______SNF___x____To be determined________ .    35 minutes spent on total encounter; more than 50% of the visit was spent counseling and/or coordinating care by the attending physician.

## 2020-07-12 NOTE — PROGRESS NOTE ADULT - SUBJECTIVE AND OBJECTIVE BOX
Patient is a 60y old  Male who presents with a chief complaint of found down with +HT, +LOC, ?AC (11 Jul 2020 10:53)    INTERVAL HPI/OVERNIGHT EVENTS: no complaints, feels well  ROS: Denies CP, SOB, AP, new weakness  All other systems reviewed and are within normal limits.  InitialHPI:  TRAUMA ACTIVATION LEVEL: ALERT       MECHANISM OF INJURY:      [] Blunt  	[] MVC	[X] Fall	[] Pedestrian Struck	[] Motorcycle   [] Assault   [] Bicycle collision  [] Sports injury     [] Penetrating  	[] Gun Shot Wound 		[] Stab Wound    GCS: 	E: 4	V: 5	M: 6    HPI:  60 year old male with PMHx of TBI, anxiety, epilepsy on home keppra, presents to the ED brought in by EMS, found down with +HT, +LOC, ?AC next to train tracks. _Patient found to have +ETOH on breath and witnessed seizure by EMS en route to hospital. Patient was postictal with urinary incontinence following seizure. Patient unable to provider further information at this time due to altered mental status. On exam patient is noted to have laceration overlying left eyebrow.     PAST MEDICAL & SURGICAL HISTORY:  TBI (traumatic brain injury)  Anxiety  Brain aneurysm  Seizure  No significant past surgical history      Allergies: No Known Allergies  Intolerances    Home Medications:  Keppra 500 mg oral tablet: 2 tab(s) orally 2 times a day (18 Jan 2020 12:49)  methadone:  (18 Jan 2020 12:49)  risperiDONE 0.5 mg oral tablet: 1 tab(s) orally 2 times a day (18 Jan 2020 12:49)  Primary Survey:    A - airway intact  B - bilateral breath sounds and good chest rise  C - palpable pulses in all extremities  D - GCS 15 on arrival, CUEVAS  Exposure obtained    Vital Signs Last 24 Hrs  T(C): 36.3 (06 Jul 2020 03:55), Max: 36.3 (06 Jul 2020 03:55)  T(F): 97.3 (06 Jul 2020 03:55), Max: 97.3 (06 Jul 2020 03:55)  HR: 88 (06 Jul 2020 03:55) (88 - 88)  BP: 144/85 (06 Jul 2020 03:55) (144/85 - 144/85)  BP(mean): --  RR: 18 (06 Jul 2020 03:55) (18 - 18)  SpO2: 100% (06 Jul 2020 03:55) (100% - 100%) (06 Jul 2020 10:58)    FRACTURE OF TRANSVERSE PROCESS OF LUMBAR VERTEBRA;RIB FX;SEIZURE;LACERAT  ^FRACTURE OF TRANSVERSE PROCESS OF LUMBAR VERTEBRA;RIB FX;SEIZURE;LACERAT  No pertinent family history in first degree relatives  Handoff  MEWS Score  TBI (traumatic brain injury)  Anxiety  Brain aneurysm  Seizure  No pertinent past medical history  Fracture of transverse process of lumbar vertebra  No significant past surgical history  SEIZURE;FELL ON TRAIN TRACK  90+  Rhabdomyolysis  Closed compression fracture of thoracic vertebra  Laceration of forehead  Seizure  Closed compression fracture of lumbar vertebra  Rib fractures    PAST MEDICAL & SURGICAL HISTORY:  TBI (traumatic brain injury)  Anxiety  Brain aneurysm  Seizure  No significant past surgical history    Gen: NAD, AA0x2  CV: nl S1 S2  Resp: decreased BS b/l  GI: NT/ND/S +BS  MS: no c/c/e  Neuro: RUE 3/5, RLE 4/5, Lt 5/5      MEDICATIONS  (STANDING):  acetaminophen   Tablet .. 650 milliGRAM(s) Oral every 6 hours  heparin   Injectable 5000 Unit(s) SubCutaneous every 8 hours  insulin glargine Injectable (LANTUS) 10 Unit(s) SubCutaneous every morning  insulin lispro (HumaLOG) corrective regimen sliding scale   SubCutaneous three times a day before meals  insulin lispro Injectable (HumaLOG) 10 Unit(s) SubCutaneous three times a day before meals  levETIRAcetam 500 milliGRAM(s) Oral two times a day  nicotine -   7 mG/24Hr(s) Patch 1 patch Transdermal daily  pantoprazole    Tablet 40 milliGRAM(s) Oral before breakfast  risperiDONE   Tablet 0.5 milliGRAM(s) Oral two times a day  senna 2 Tablet(s) Oral at bedtime    MEDICATIONS  (PRN):  ondansetron Injectable 4 milliGRAM(s) IV Push every 6 hours PRN Nausea  oxyCODONE    IR 10 milliGRAM(s) Oral every 6 hours PRN Moderate Pain (4 - 6)    Home Medications:  Keppra 500 mg oral tablet: 2 tab(s) orally 2 times a day (18 Jan 2020 12:49)  methadone:  (18 Jan 2020 12:49)  risperiDONE 0.5 mg oral tablet: 1 tab(s) orally 2 times a day (18 Jan 2020 12:49)    Vital Signs Last 24 Hrs  T(C): 36.6 (12 Jul 2020 13:22), Max: 37.7 (12 Jul 2020 02:20)  T(F): 97.9 (12 Jul 2020 13:22), Max: 99.9 (12 Jul 2020 02:20)  HR: 115 (12 Jul 2020 13:22) (83 - 115)  BP: 156/89 (12 Jul 2020 13:22) (156/89 - 187/91)  BP(mean): --  RR: 18 (12 Jul 2020 13:22) (18 - 18)  SpO2: --  CAPILLARY BLOOD GLUCOSE      POCT Blood Glucose.: 314 mg/dL (12 Jul 2020 11:56)  POCT Blood Glucose.: 213 mg/dL (12 Jul 2020 07:37)  POCT Blood Glucose.: 208 mg/dL (11 Jul 2020 21:15)  POCT Blood Glucose.: 269 mg/dL (11 Jul 2020 16:28)    LABS:                        11.6   7.15  )-----------( 154      ( 10 Jul 2020 21:12 )             35.0     07-10    143  |  108  |  23<H>  ----------------------------<  168<H>  4.1   |  24  |  0.9    Ca    8.5      10 Jul 2020 21:12  Phos  3.5     07-10  Mg     1.9     07-10    TPro  5.7<L>  /  Alb  3.4<L>  /  TBili  0.5  /  DBili  <0.2  /  AST  44<H>  /  ALT  79<H>  /  AlkPhos  52  07-10    LIVER FUNCTIONS - ( 10 Jul 2020 21:12 )  Alb: 3.4 g/dL / Pro: 5.7 g/dL / ALK PHOS: 52 U/L / ALT: 79 U/L / AST: 44 U/L / GGT: x           CARDIAC MARKERS ( 10 Jul 2020 21:12 )  x     / x     / 114 U/L / x     / x                      Chart, Consultant(s) Notes Reviewed:  [x ] YES  [ ] NO  Care Discussed with Consultants/Other Providers/ Housestaff [ x] YES  [ ] NO  Radiology, labs, old available records personally reviewed.

## 2020-07-13 ENCOUNTER — TRANSCRIPTION ENCOUNTER (OUTPATIENT)
Age: 60
End: 2020-07-13

## 2020-07-13 VITALS — HEIGHT: 65 IN

## 2020-07-13 LAB
GLUCOSE BLDC GLUCOMTR-MCNC: 207 MG/DL — HIGH (ref 70–99)
GLUCOSE BLDC GLUCOMTR-MCNC: 214 MG/DL — HIGH (ref 70–99)
GLUCOSE BLDC GLUCOMTR-MCNC: 266 MG/DL — HIGH (ref 70–99)

## 2020-07-13 PROCEDURE — 99239 HOSP IP/OBS DSCHRG MGMT >30: CPT

## 2020-07-13 RX ORDER — LEVETIRACETAM 250 MG/1
1 TABLET, FILM COATED ORAL
Qty: 0 | Refills: 0 | DISCHARGE
Start: 2020-07-13

## 2020-07-13 RX ORDER — LEVETIRACETAM 250 MG/1
1000 TABLET, FILM COATED ORAL
Refills: 0 | Status: DISCONTINUED | OUTPATIENT
Start: 2020-07-13 | End: 2020-07-13

## 2020-07-13 RX ORDER — INSULIN GLARGINE 100 [IU]/ML
35 INJECTION, SOLUTION SUBCUTANEOUS
Qty: 0 | Refills: 0 | DISCHARGE
Start: 2020-07-13

## 2020-07-13 RX ORDER — RISPERIDONE 4 MG/1
1 TABLET ORAL
Qty: 0 | Refills: 0 | DISCHARGE
Start: 2020-07-13

## 2020-07-13 RX ORDER — INSULIN LISPRO 100/ML
5 VIAL (ML) SUBCUTANEOUS
Qty: 0 | Refills: 0 | DISCHARGE
Start: 2020-07-13

## 2020-07-13 RX ORDER — FOLIC ACID 0.8 MG
1 TABLET ORAL DAILY
Refills: 0 | Status: DISCONTINUED | OUTPATIENT
Start: 2020-07-13 | End: 2020-07-13

## 2020-07-13 RX ORDER — NICOTINE POLACRILEX 2 MG
1 GUM BUCCAL
Qty: 0 | Refills: 0 | DISCHARGE
Start: 2020-07-13

## 2020-07-13 RX ORDER — MULTIVIT-MIN/FERROUS GLUCONATE 9 MG/15 ML
1 LIQUID (ML) ORAL
Qty: 0 | Refills: 0 | DISCHARGE
Start: 2020-07-13

## 2020-07-13 RX ORDER — MULTIVIT-MIN/FERROUS GLUCONATE 9 MG/15 ML
1 LIQUID (ML) ORAL DAILY
Refills: 0 | Status: DISCONTINUED | OUTPATIENT
Start: 2020-07-13 | End: 2020-07-13

## 2020-07-13 RX ORDER — OXYCODONE HYDROCHLORIDE 5 MG/1
1 TABLET ORAL
Qty: 0 | Refills: 0 | DISCHARGE
Start: 2020-07-13

## 2020-07-13 RX ORDER — INSULIN LISPRO 100/ML
8 VIAL (ML) SUBCUTANEOUS
Qty: 0 | Refills: 0 | DISCHARGE
Start: 2020-07-13

## 2020-07-13 RX ORDER — INSULIN GLARGINE 100 [IU]/ML
0 INJECTION, SOLUTION SUBCUTANEOUS
Qty: 0 | Refills: 0 | DISCHARGE
Start: 2020-07-13

## 2020-07-13 RX ORDER — SENNA PLUS 8.6 MG/1
2 TABLET ORAL
Qty: 0 | Refills: 0 | DISCHARGE
Start: 2020-07-13

## 2020-07-13 RX ORDER — LEVETIRACETAM 250 MG/1
2 TABLET, FILM COATED ORAL
Qty: 0 | Refills: 0 | DISCHARGE

## 2020-07-13 RX ORDER — RISPERIDONE 4 MG/1
1 TABLET ORAL
Qty: 0 | Refills: 0 | DISCHARGE

## 2020-07-13 RX ORDER — INSULIN GLARGINE 100 [IU]/ML
25 INJECTION, SOLUTION SUBCUTANEOUS
Qty: 0 | Refills: 0 | DISCHARGE
Start: 2020-07-13

## 2020-07-13 RX ORDER — INSULIN LISPRO 100/ML
0 VIAL (ML) SUBCUTANEOUS
Qty: 0 | Refills: 0 | DISCHARGE
Start: 2020-07-13

## 2020-07-13 RX ORDER — METHADONE HYDROCHLORIDE 40 MG/1
0 TABLET ORAL
Qty: 0 | Refills: 0 | DISCHARGE

## 2020-07-13 RX ORDER — THIAMINE MONONITRATE (VIT B1) 100 MG
1 TABLET ORAL
Qty: 0 | Refills: 0 | DISCHARGE
Start: 2020-07-13

## 2020-07-13 RX ORDER — ACETAMINOPHEN 500 MG
2 TABLET ORAL
Qty: 0 | Refills: 0 | DISCHARGE
Start: 2020-07-13

## 2020-07-13 RX ORDER — THIAMINE MONONITRATE (VIT B1) 100 MG
100 TABLET ORAL DAILY
Refills: 0 | Status: DISCONTINUED | OUTPATIENT
Start: 2020-07-13 | End: 2020-07-13

## 2020-07-13 RX ORDER — FOLIC ACID 0.8 MG
1 TABLET ORAL
Qty: 0 | Refills: 0 | DISCHARGE
Start: 2020-07-13

## 2020-07-13 RX ADMIN — LEVETIRACETAM 500 MILLIGRAM(S): 250 TABLET, FILM COATED ORAL at 05:49

## 2020-07-13 RX ADMIN — PANTOPRAZOLE SODIUM 40 MILLIGRAM(S): 20 TABLET, DELAYED RELEASE ORAL at 07:46

## 2020-07-13 RX ADMIN — RISPERIDONE 0.5 MILLIGRAM(S): 4 TABLET ORAL at 17:53

## 2020-07-13 RX ADMIN — Medication 1 MILLIGRAM(S): at 14:25

## 2020-07-13 RX ADMIN — RISPERIDONE 0.5 MILLIGRAM(S): 4 TABLET ORAL at 05:49

## 2020-07-13 RX ADMIN — Medication 1 TABLET(S): at 14:24

## 2020-07-13 RX ADMIN — HEPARIN SODIUM 5000 UNIT(S): 5000 INJECTION INTRAVENOUS; SUBCUTANEOUS at 05:49

## 2020-07-13 RX ADMIN — Medication 650 MILLIGRAM(S): at 00:34

## 2020-07-13 RX ADMIN — Medication 650 MILLIGRAM(S): at 11:40

## 2020-07-13 RX ADMIN — Medication 650 MILLIGRAM(S): at 17:42

## 2020-07-13 RX ADMIN — HEPARIN SODIUM 5000 UNIT(S): 5000 INJECTION INTRAVENOUS; SUBCUTANEOUS at 14:25

## 2020-07-13 RX ADMIN — LEVETIRACETAM 1000 MILLIGRAM(S): 250 TABLET, FILM COATED ORAL at 17:43

## 2020-07-13 RX ADMIN — Medication 650 MILLIGRAM(S): at 05:49

## 2020-07-13 RX ADMIN — Medication 100 MILLIGRAM(S): at 14:25

## 2020-07-13 NOTE — DISCHARGE NOTE NURSING/CASE MANAGEMENT/SOCIAL WORK - PATIENT PORTAL LINK FT
You can access the FollowMyHealth Patient Portal offered by [facility] by registering at the following website: [email or web address]. By joining [employer]’s FollowMyHealth portal, you will also be able to view your health information using other applications (apps) compatible with our system.
You can access the FollowMyHealth Patient Portal offered by [facility] by registering at the following website: [email or web address]. By joining [employer]’s FollowMyHealth portal, you will also be able to view your health information using other applications (apps) compatible with our system.

## 2020-07-13 NOTE — CHART NOTE - NSCHARTNOTEFT_GEN_A_CORE
<<<RESIDENT DISCHARGE NOTE>>>     FABIOLA PORTILLO  MRN-[id number]    VITAL SIGNS:  T(F): 97.9 (07-13-20 @ 06:21), Max: 98.7 (07-12-20 @ 21:35)  HR: 89 (07-13-20 @ 06:21)  BP: 147/96 (07-13-20 @ 06:21)  SpO2: 96% (07-13-20 @ 06:21)      TEST RESULTS:  Traumatic Lumbar transverse process/Vertebral body fractures  Seizures  DM  Hyperglycemia  -adjusted Insulin  -in view of prolonged baseline QTc, pt probably not a good Methadone candidate  -Keppra  -outpt f/u w/ neuro, trauma, PMD              FINAL DISCHARGE INTERVIEW:  Resident(s) Present: (Name:Dr. Macedo), RN Present: (Name:  ___________)    DISCHARGE MEDICATION RECONCILIATION  reviewed with Attending (Name: Dr. Stovall)  DISPOSITION:   [x] Home,    [  ] Home with Visiting Nursing Services,   [    ]  SNF/ NH,    [   ] Acute Rehab (4A),   [   ] Other (Specify:_________)

## 2020-07-13 NOTE — PROGRESS NOTE ADULT - REASON FOR ADMISSION
found down with +HT, +LOC, ?AC

## 2020-07-13 NOTE — DISCHARGE NOTE PROVIDER - NSDCMRMEDTOKEN_GEN_ALL_CORE_FT
acetaminophen 325 mg oral tablet: 2 tab(s) orally every 6 hours  folic acid 1 mg oral tablet: 1 tab(s) orally once a day  insulin glargine: 25 unit(s) subcutaneous once a day (at bedtime)  insulin lispro (concentrated) 200 units/mL subcutaneous solution: 8 unit(s) subcutaneous 3 times a day (before meals)  levETIRAcetam 1000 mg oral tablet: 1 tab(s) orally 2 times a day  Multiple Vitamins with Minerals oral tablet: 1 tab(s) orally once a day  nicotine 7 mg/24 hr transdermal film, extended release: 1 patch transdermal once a day  risperiDONE 0.5 mg oral tablet: 1 tab(s) orally 2 times a day  Roxicodone 5 mg oral tablet: 1 tab(s) orally every 6 hours, As Needed  senna oral tablet: 2 tab(s) orally once a day (at bedtime)  thiamine 100 mg oral tablet: 1 tab(s) orally once a day

## 2020-07-13 NOTE — DIETITIAN INITIAL EVALUATION ADULT. - CONTINUE CURRENT NUTRITION CARE PLAN
pt to consume and tolerate >75% of all meals and snacks through LOS. meals and snacks. RD to monitor diet order, energy intake, body composition, NFPF (appetite)
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
## 2020-07-13 NOTE — PROGRESS NOTE ADULT - ASSESSMENT
#Traumatic Lumbar transverse process, Vertebral body fractures/Rib Fractures/H/o TBI, ruptured Brain aneurysm/Seizures/Polysubstance abuse and dependance/DM w/ Hyperglycemia/Rhabdo/QTc prolongation  -adjusted Insulin  -in view of prolonged baseline QTc, pt probably not a good Methadone candidate  -Keppra  -outpt f/u w/ neuro, trauma, PMD    Dispo: stable for d/c

## 2020-07-13 NOTE — DIETITIAN INITIAL EVALUATION ADULT. - REASON INDICATOR FOR ASSESSMENT
LOS - pt is AOx2, unable to answer my questions, resting at this time, Spoken with both PCA and RN, pt consumed well for breakfast and likely a good PO eater. No oral issue reported. LBM 7/11. Ecchymosis to skin. No edema. on Carbohydrate Consistent diet.

## 2020-07-13 NOTE — DISCHARGE NOTE PROVIDER - CARE PROVIDERS DIRECT ADDRESSES
,[email or web address]

## 2020-07-13 NOTE — DISCHARGE NOTE PROVIDER - PROVIDER TOKENS
PROVIDER:[TOKEN:[[id number]],FOLLOWUP:[Routine]] PROVIDER:[TOKEN:[[id number]],FOLLOWUP:[2 weeks]],PROVIDER:[TOKEN:[[id number]],FOLLOWUP:[2 weeks]],FREE:[LAST:[Primary Medical Doctor],PHONE:[(   )    -],FAX:[(   )    -],FOLLOWUP:[1 week]]

## 2020-07-13 NOTE — DISCHARGE NOTE PROVIDER - NSDCFUSCHEDAPPT_GEN_ALL_CORE_FT
FABIOLA PORTILLO ; 10/07/2020 ; [facility] [address] FABIOLA PORTILLO ; 10/07/2020 ; [facility] [address] FABIOLA PORTILLO ; 10/07/2020 ; [facility] [address] FABIOLA PORTILLO ; 10/07/2020 ; [facility] [address]

## 2020-07-13 NOTE — DISCHARGE NOTE PROVIDER - NSDCCPCAREPLAN_GEN_ALL_CORE_FT
PRINCIPAL DISCHARGE DIAGNOSIS  Diagnosis: Fracture of transverse process of lumbar vertebra  Assessment and Plan of Treatment: This was a fracture, that was managed by trauma surgery team PRINCIPAL DISCHARGE DIAGNOSIS  Diagnosis: Fracture of transverse process of lumbar vertebra  Assessment and Plan of Treatment: This was a fracture, that was managed by trauma surgery team. Please use TLSO brace and follow your rehab excercises PRINCIPAL DISCHARGE DIAGNOSIS  Diagnosis: Fracture of transverse process of lumbar vertebra  Assessment and Plan of Treatment: 1. Acute left-sided transverse process fractures of L1, L2, and L3. No retroperitoneal hematoma. 2. Acute mildly displaced fractures of the left posterior 12th rib, which appears to be fractured in two places. 3. Acute wedge fracture of the L1 anterosuperior vertebral body. Age indeterminate, mild T3-T5 vertebral body compression f1. Acute left-sided transverse process fractures of L1, L2, and L3. No retroperitoneal hematoma. 2. Acute mildly displaced fractures of the left posterior 12th rib, which appears to be fractured in two places. 3. Acute wedge fracture of the L1 anterosuperior vertebral body. Age indeterminate, mild T3-T5 vertebral body compression fractures. 4. Questionable age-indeterminate fractures of the right anterior 3rd - 5th ribs. The evaluation of the ribs is limited secondary to motion artifact.ractures. 4. Questionable age-indeterminate fractures of the right anterior 3rd - 5th ribs.   Use TLSO brace when ambulating. Please follow up with trauma surgeon as outpt.      SECONDARY DISCHARGE DIAGNOSES  Diagnosis: Diabetes mellitus, type II, insulin dependent  Assessment and Plan of Treatment: monitor FS as outpt.    Diagnosis: History of traumatic brain injury  Assessment and Plan of Treatment: cont Keppra. Follow up with neurologist    Diagnosis: Alcoholism /alcohol abuse  Assessment and Plan of Treatment: abstain from Alcohol. Take thiamine, folic acid, MVIs PRINCIPAL DISCHARGE DIAGNOSIS  Diagnosis: Fracture of transverse process of lumbar vertebra  Assessment and Plan of Treatment: 1. Acute left-sided transverse process fractures of L1, L2, and L3. No retroperitoneal hematoma. 2. Acute mildly displaced fractures of the left posterior 12th rib, which appears to be fractured in two places. 3. Acute wedge fracture of the L1 anterosuperior vertebral body. Age indeterminate, mild T3-T5 vertebral body compression f1. Acute left-sided transverse process fractures of L1, L2, and L3. No retroperitoneal hematoma. 2. Acute mildly displaced fractures of the left posterior 12th rib, which appears to be fractured in two places. 3. Acute wedge fracture of the L1 anterosuperior vertebral body. Age indeterminate, mild T3-T5 vertebral body compression fractures. 4. Questionable age-indeterminate fractures of the right anterior 3rd - 5th ribs. The evaluation of the ribs is limited secondary to motion artifact.ractures. 4. Questionable age-indeterminate fractures of the right anterior 3rd - 5th ribs.   Use TLSO brace when ambulating. Please follow up with trauma surgeon as outpt.      SECONDARY DISCHARGE DIAGNOSES  Diagnosis: Hepatitis C  Assessment and Plan of Treatment: follow up with GI or ID for treatment    Diagnosis: Diabetes mellitus, type II, insulin dependent  Assessment and Plan of Treatment: monitor FS as outpt.    Diagnosis: History of traumatic brain injury  Assessment and Plan of Treatment: cont Keppra. Follow up with neurologist    Diagnosis: Alcoholism /alcohol abuse  Assessment and Plan of Treatment: abstain from Alcohol. Take thiamine, folic acid, MVIs
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
## 2020-07-13 NOTE — DISCHARGE NOTE PROVIDER - CARE PROVIDER_API CALL
Luis Brown  NEUROMUSCULAR MEDICINE  [address] [place] [id number]  Phone: [phone]  Fax: [phone]  Follow Up Time: Routine Luis Brown  NEUROMUSCULAR MEDICINE  [address] [place] [address]  Phone: [phone]  Fax: [phone]  Follow Up Time: 2 weeks    Daryl Cui  SURGERY  [address] [place] [address]  Phone: [phone]  Fax: [phone]  Follow Up Time: 2 weeks    Primary Medical Doctor,   Phone: (   )    -  Fax: (   )    -  Follow Up Time: 1 week

## 2020-07-20 DIAGNOSIS — S32.038A OTHER FRACTURE OF THIRD LUMBAR VERTEBRA, INITIAL ENCOUNTER FOR CLOSED FRACTURE: ICD-10-CM

## 2020-07-20 DIAGNOSIS — R32 UNSPECIFIED URINARY INCONTINENCE: ICD-10-CM

## 2020-07-20 DIAGNOSIS — S32.010A WEDGE COMPRESSION FRACTURE OF FIRST LUMBAR VERTEBRA, INITIAL ENCOUNTER FOR CLOSED FRACTURE: ICD-10-CM

## 2020-07-20 DIAGNOSIS — E83.39 OTHER DISORDERS OF PHOSPHORUS METABOLISM: ICD-10-CM

## 2020-07-20 DIAGNOSIS — S22.039A UNSPECIFIED FRACTURE OF THIRD THORACIC VERTEBRA, INITIAL ENCOUNTER FOR CLOSED FRACTURE: ICD-10-CM

## 2020-07-20 DIAGNOSIS — S22.32XA FRACTURE OF ONE RIB, LEFT SIDE, INITIAL ENCOUNTER FOR CLOSED FRACTURE: ICD-10-CM
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
## 2020-07-20 DIAGNOSIS — S32.029A UNSPECIFIED FRACTURE OF SECOND LUMBAR VERTEBRA, INITIAL ENCOUNTER FOR CLOSED FRACTURE: ICD-10-CM

## 2020-07-20 DIAGNOSIS — F10.10 ALCOHOL ABUSE, UNCOMPLICATED: ICD-10-CM
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
## 2020-07-20 DIAGNOSIS — T79.6XXA TRAUMATIC ISCHEMIA OF MUSCLE, INITIAL ENCOUNTER: ICD-10-CM

## 2020-07-20 DIAGNOSIS — Z87.820 PERSONAL HISTORY OF TRAUMATIC BRAIN INJURY: ICD-10-CM

## 2020-07-20 DIAGNOSIS — S06.9X9A UNSPECIFIED INTRACRANIAL INJURY WITH LOSS OF CONSCIOUSNESS OF UNSPECIFIED DURATION, INITIAL ENCOUNTER: ICD-10-CM

## 2020-07-20 DIAGNOSIS — S22.41XA MULTIPLE FRACTURES OF RIBS, RIGHT SIDE, INITIAL ENCOUNTER FOR CLOSED FRACTURE: ICD-10-CM

## 2020-07-20 DIAGNOSIS — R94.31 ABNORMAL ELECTROCARDIOGRAM [ECG] [EKG]: ICD-10-CM

## 2020-07-20 DIAGNOSIS — S22.049A UNSPECIFIED FRACTURE OF FOURTH THORACIC VERTEBRA, INITIAL ENCOUNTER FOR CLOSED FRACTURE: ICD-10-CM

## 2020-07-20 DIAGNOSIS — E11.65 TYPE 2 DIABETES MELLITUS WITH HYPERGLYCEMIA: ICD-10-CM

## 2020-07-20 DIAGNOSIS — N17.9 ACUTE KIDNEY FAILURE, UNSPECIFIED: ICD-10-CM

## 2020-07-20 DIAGNOSIS — B19.20 UNSPECIFIED VIRAL HEPATITIS C WITHOUT HEPATIC COMA: ICD-10-CM
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
## 2020-07-20 DIAGNOSIS — F11.20 OPIOID DEPENDENCE, UNCOMPLICATED: ICD-10-CM

## 2020-07-20 DIAGNOSIS — Z79.4 LONG TERM (CURRENT) USE OF INSULIN: ICD-10-CM

## 2020-07-20 DIAGNOSIS — G40.909 EPILEPSY, UNSPECIFIED, NOT INTRACTABLE, WITHOUT STATUS EPILEPTICUS: ICD-10-CM
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
## 2020-07-20 DIAGNOSIS — S01.81XA LACERATION WITHOUT FOREIGN BODY OF OTHER PART OF HEAD, INITIAL ENCOUNTER: ICD-10-CM

## 2020-07-20 DIAGNOSIS — F41.9 ANXIETY DISORDER, UNSPECIFIED: ICD-10-CM

## 2020-08-10 ENCOUNTER — APPOINTMENT (OUTPATIENT)
Dept: SURGERY | Facility: CLINIC | Age: 60
End: 2020-08-10
Payer: MEDICARE

## 2020-08-10 VITALS
HEIGHT: 65 IN | WEIGHT: 147 LBS | SYSTOLIC BLOOD PRESSURE: 119 MMHG | BODY MASS INDEX: 24.49 KG/M2 | TEMPERATURE: 97.8 F | DIASTOLIC BLOOD PRESSURE: 80 MMHG

## 2020-08-10 PROCEDURE — 99213 OFFICE O/P EST LOW 20 MIN: CPT

## 2020-08-15 NOTE — PHYSICAL EXAM
[No Rash or Lesion] : No rash or lesion [JVD] : no jugular venous distention  [Normal Breath Sounds] : Normal breath sounds [Normal Heart Sounds] : normal heart sounds [Alert] : alert [Oriented to Person] : oriented to person [Oriented to Place] : oriented to place [Oriented to Time] : oriented to time [Calm] : calm [de-identified] : comfortable [de-identified] : SAJI [de-identified] : supple [de-identified] : +BS, soft, nontender [de-identified] : wnl

## 2020-08-15 NOTE — HISTORY OF PRESENT ILLNESS
[FreeTextEntry1] : Patient had a fall and sustained left rib fractures.\par Pain is controlled.\par Using incentive spirometer at home

## 2020-08-24 ENCOUNTER — APPOINTMENT (OUTPATIENT)
Dept: HEPATOLOGY | Facility: CLINIC | Age: 60
End: 2020-08-24

## 2020-08-24 PROBLEM — S06.9X9A UNSPECIFIED INTRACRANIAL INJURY WITH LOSS OF CONSCIOUSNESS OF UNSPECIFIED DURATION, INITIAL ENCOUNTER: Chronic | Status: ACTIVE | Noted: 2020-07-06

## 2020-10-07 ENCOUNTER — APPOINTMENT (OUTPATIENT)
Dept: NEUROLOGY | Facility: CLINIC | Age: 60
End: 2020-10-07
Payer: MEDICARE

## 2020-10-07 VITALS
OXYGEN SATURATION: 97 % | HEIGHT: 65 IN | SYSTOLIC BLOOD PRESSURE: 122 MMHG | DIASTOLIC BLOOD PRESSURE: 70 MMHG | TEMPERATURE: 97.3 F | WEIGHT: 161 LBS | HEART RATE: 90 BPM | BODY MASS INDEX: 26.82 KG/M2

## 2020-10-07 PROCEDURE — 99214 OFFICE O/P EST MOD 30 MIN: CPT

## 2020-10-07 NOTE — ASSESSMENT
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
## 2020-10-07 NOTE — PHYSICAL EXAM
[FreeTextEntry1] : A/A/O place , person day\par follows commands\par spontaneous and interactive\par Good mood\par 2/3---3/3 recall , good  executive behavior\par normal language\par right sided hemiparesis and paretic gait\par \par

## 2020-11-06 ENCOUNTER — APPOINTMENT (OUTPATIENT)
Dept: GASTROENTEROLOGY | Facility: CLINIC | Age: 60
End: 2020-11-06

## 2020-11-16 ENCOUNTER — APPOINTMENT (OUTPATIENT)
Dept: HEPATOLOGY | Facility: CLINIC | Age: 60
End: 2020-11-16

## 2020-12-21 ENCOUNTER — APPOINTMENT (OUTPATIENT)
Dept: HEPATOLOGY | Facility: CLINIC | Age: 60
End: 2020-12-21

## 2020-12-28 ENCOUNTER — APPOINTMENT (OUTPATIENT)
Dept: NEUROLOGY | Facility: CLINIC | Age: 60
End: 2020-12-28
Payer: MEDICARE

## 2020-12-28 PROCEDURE — 95816 EEG AWAKE AND DROWSY: CPT

## 2021-02-26 ENCOUNTER — APPOINTMENT (OUTPATIENT)
Dept: GASTROENTEROLOGY | Facility: CLINIC | Age: 61
End: 2021-02-26
Payer: MEDICARE

## 2021-02-26 DIAGNOSIS — D64.9 ANEMIA, UNSPECIFIED: ICD-10-CM

## 2021-02-26 DIAGNOSIS — Z86.19 PERSONAL HISTORY OF OTHER INFECTIOUS AND PARASITIC DISEASES: ICD-10-CM

## 2021-02-26 DIAGNOSIS — Z86.39 PERSONAL HISTORY OF OTHER ENDOCRINE, NUTRITIONAL AND METABOLIC DISEASE: ICD-10-CM

## 2021-02-26 DIAGNOSIS — Z86.59 PERSONAL HISTORY OF OTHER MENTAL AND BEHAVIORAL DISORDERS: ICD-10-CM

## 2021-02-26 DIAGNOSIS — Z86.2 PERSONAL HISTORY OF DISEASES OF THE BLOOD AND BLOOD-FORMING ORGANS AND CERTAIN DISORDERS INVOLVING THE IMMUNE MECHANISM: ICD-10-CM

## 2021-02-26 DIAGNOSIS — R56.9 UNSPECIFIED CONVULSIONS: ICD-10-CM

## 2021-02-26 DIAGNOSIS — Z86.79 PERSONAL HISTORY OF OTHER DISEASES OF THE CIRCULATORY SYSTEM: ICD-10-CM

## 2021-02-26 DIAGNOSIS — F10.11 ALCOHOL ABUSE, IN REMISSION: ICD-10-CM

## 2021-02-26 PROCEDURE — 99443: CPT | Mod: 95

## 2021-02-26 NOTE — HISTORY OF PRESENT ILLNESS
[Home] : at home, [unfilled] , at the time of the visit. 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 Denies any symptoms. Apetitie good, no dysphagia, abdominal pain or wt loss. Bowel habits are normal No blood in stools. Denies ever having had a colonoscopy.

## 2021-04-08 ENCOUNTER — APPOINTMENT (OUTPATIENT)
Dept: NEUROLOGY | Facility: CLINIC | Age: 61
End: 2021-04-08
Payer: MEDICARE

## 2021-04-08 VITALS
DIASTOLIC BLOOD PRESSURE: 70 MMHG | TEMPERATURE: 97.4 F | WEIGHT: 177.25 LBS | HEART RATE: 88 BPM | HEIGHT: 64 IN | OXYGEN SATURATION: 98 % | BODY MASS INDEX: 30.26 KG/M2 | SYSTOLIC BLOOD PRESSURE: 135 MMHG

## 2021-04-08 DIAGNOSIS — Z87.820 PERSONAL HISTORY OF TRAUMATIC BRAIN INJURY: ICD-10-CM

## 2021-04-08 PROCEDURE — 99213 OFFICE O/P EST LOW 20 MIN: CPT

## 2021-04-08 RX ORDER — PNEUMOCOCCAL 23-VAL P-SAC VAC 25MCG/0.5
25 VIAL (ML) INJECTION
Refills: 0 | Status: ACTIVE | COMMUNITY

## 2021-04-08 RX ORDER — FOLIC ACID 1 MG/1
1 TABLET ORAL
Refills: 0 | Status: ACTIVE | COMMUNITY

## 2021-04-08 RX ORDER — LEVETIRACETAM 1000 MG/1
1000 TABLET, FILM COATED ORAL TWICE DAILY
Refills: 0 | Status: ACTIVE | COMMUNITY

## 2021-04-08 RX ORDER — INSULIN ADMIN. SUPPLIES
30G X 8 MM INSULIN PEN (EA) SUBCUTANEOUS
Refills: 0 | Status: ACTIVE | COMMUNITY

## 2021-04-08 RX ORDER — DOCUSATE SODIUM 50 MG AND SENNOSIDES 8.6 MG 8.6; 5 MG/1; MG/1
8.6-5 TABLET, FILM COATED ORAL
Refills: 0 | Status: ACTIVE | COMMUNITY

## 2021-04-08 RX ORDER — MULTIVITAMIN
TABLET ORAL
Refills: 0 | Status: ACTIVE | COMMUNITY

## 2021-04-08 RX ORDER — ACETAMINOPHEN 325 MG/1
325 TABLET ORAL
Refills: 0 | Status: ACTIVE | COMMUNITY

## 2021-04-09 NOTE — DISCUSSION/SUMMARY
[Safety Recommendations] : The patient was advised in regards to the risk of seizures and general seizure safety recommendations including not to be bathing alone, climbing to high places and operating heavy machinery. [Compliance with Medications] : The importance of compliance with medications was reinforced. [Medication Side Effects] : High frequency and serious potential medication adverse effects were reviewed with the patient, including but not exclusive to psychiatric effects.  Information sheets on medication side effects were made available to the patient in our clinic.  The patient or advocate agrees to notify us for any concerns. [Sleep Hygiene/Sleep Disruption Risks] : Sleep hygiene and the risks of sleep disruption were discussed. [FreeTextEntry1] : Jared is a 61 year old right handed male  with PMHx of TBI, anxiety and seizure disorder with no recent events.\par \par Plan:\par - continue current Keppra dose on 100 mgs po q12hrs\par - Rx given for levels to be drawn before next visit\par - Follow up in 6 months\par \par Case discussed with Dr. Marquis\par \par Nat Harris, DNP, ACNP-BC

## 2021-04-09 NOTE — HISTORY OF PRESENT ILLNESS
[FreeTextEntry1] : Jared is a 61 year old right handed male  with PMHx of TBI, anxiety and seizure disorder. \par Last breakthrough event July 2020. Pt is a resident at [facility] and is accompanied by an aide. As per aide and pt he is doing well no events. Tolerating Keppra, no side effects. Last Keppra level from 4/2021 26.\par \par Since last visit pt had a REEG on 12/20 which showed presence of focal slowing and left sharp transients in the anterior quadrant.

## 2021-04-09 NOTE — PHYSICAL EXAM
[FreeTextEntry1] : Cognitive/Language:  The patient is oriented to self place not month, date or year.  Language w/ decreased repetition and paraphasic errors.  follows commands.  \par Eyes: intact VA, VFF.  EOMI w/o nystagmus, skew or reported double vision.  PERRL.  No ptosis/weakness of eyelid closure.  \par Face:  Facial sensation normal V1 - 3, no facial asymmetry.  \par Motor examination:   Normal tone, bulk and range of motion.  No tenderness, twitching, tremors or involuntary movements.\par Formal Muscle Strength Testing: RUE 3/5 spastic and contracted. LUE 5/5 RLE 4/5 LLE 5/5\par Reflexes: RUE hyperreflexive RLE hyperreflexive  LUE LLE 2+\par Sensory examination:   Intact to light touch and pinprick, pain, temperature and proprioception and vibration in all extremities.\par Cerebellum:   FTN/HKS intact with normal OSWALD in all limbs.  No dysmetria or dysdiadokinesia.

## 2021-06-02 ENCOUNTER — APPOINTMENT (OUTPATIENT)
Dept: ENDOCRINOLOGY | Facility: CLINIC | Age: 61
End: 2021-06-02

## 2021-06-02 ENCOUNTER — OUTPATIENT (OUTPATIENT)
Dept: OUTPATIENT SERVICES | Facility: HOSPITAL | Age: 61
LOS: 1 days | Discharge: HOME | End: 2021-06-02

## 2021-06-02 RX ORDER — ALOGLIPTIN 12.5 MG/1
12.5 TABLET, FILM COATED ORAL
Refills: 0 | Status: DISCONTINUED | COMMUNITY
End: 2021-06-02

## 2021-06-02 NOTE — REVIEW OF SYSTEMS
[Fatigue] : no fatigue [Recent Weight Gain (___ Lbs)] : no recent weight gain [Recent Weight Loss (___ Lbs)] : no recent weight loss [Blurred Vision] : no blurred vision [Dysphagia] : no dysphagia [Neck Pain] : no neck pain [Dysphonia] : no dysphonia [Chest Pain] : no chest pain [Slow Heart Rate] : heart rate is not slow [Palpitations] : no palpitations [Lower Ext Edema] : no lower extremity edema [Shortness Of Breath] : no shortness of breath [SOB on Exertion] : no shortness of breath on exertion [Nausea] : no nausea [Constipation] : no constipation [Vomiting] : no vomiting [Diarrhea] : no diarrhea [Gas/Bloating] : no gas/bloating [Nocturia] : no nocturia [Headaches] : no headaches [Tremors] : no tremors [Cold Intolerance] : no cold intolerance [Heat Intolerance] : no heat intolerance

## 2021-06-02 NOTE — HISTORY OF PRESENT ILLNESS
[FreeTextEntry1] : 61 year old male who present for initial evaluation of diabetes, per facility chart patient has type 1 DM ? and is on on basaglar 35 units at bedtime  and lispro 5 units tIdac hold  fs <110 also on atorvastatin 20 mg daily \par \par smbg : reviewed with at target am FS but spikes before lunch and dinner \par follows with podiatry and opthalmology at the facility \par \par

## 2021-06-02 NOTE — ASSESSMENT
[FreeTextEntry1] : 70 year old patient who present for initial evaluation of diabetes :\par \par #type 1 DM (per facility chart , asked for more record)\par - 4/2021 hba1c 6.6% on basaglar 35 units and Lispro 5 tidac \par - SMBG with am FS at target no hypoglycemia but spikes premeal \par - Continue basaglar 35 units and increase lispro to 7 units TIDAC \par - uptodate with opthalmology and podiatry \par - on continue statin

## 2021-06-02 NOTE — PHYSICAL EXAM
[No Acute Distress] : no acute distress [Well Developed] : well developed [No Proptosis] : no proptosis [No Lid Lag] : no lid lag [Thyroid Not Enlarged] : the thyroid was not enlarged [No Respiratory Distress] : no respiratory distress [No Accessory Muscle Use] : no accessory muscle use [Clear to Auscultation] : lungs were clear to auscultation bilaterally [Normal S1, S2] : normal S1 and S2 [No Murmurs] : no murmurs [Regular Rhythm] : with a regular rhythm [No Edema] : no peripheral edema [Not Tender] : non-tender [Soft] : abdomen soft [Abdominal Striae] : no abdominal striae [Acanthosis Nigricans] : acanthosis nigricans present [Oriented x3] : oriented to person, place, and time

## 2021-06-10 DIAGNOSIS — E66.01 MORBID (SEVERE) OBESITY DUE TO EXCESS CALORIES: ICD-10-CM

## 2021-06-10 DIAGNOSIS — E10.65 TYPE 1 DIABETES MELLITUS WITH HYPERGLYCEMIA: ICD-10-CM

## 2021-06-10 DIAGNOSIS — E78.5 HYPERLIPIDEMIA, UNSPECIFIED: ICD-10-CM

## 2021-09-15 ENCOUNTER — APPOINTMENT (OUTPATIENT)
Dept: ENDOCRINOLOGY | Facility: CLINIC | Age: 61
End: 2021-09-15

## 2021-10-06 ENCOUNTER — APPOINTMENT (OUTPATIENT)
Dept: NEUROLOGY | Facility: CLINIC | Age: 61
End: 2021-10-06
Payer: MEDICARE

## 2021-10-06 VITALS
TEMPERATURE: 97.8 F | SYSTOLIC BLOOD PRESSURE: 130 MMHG | WEIGHT: 183 LBS | BODY MASS INDEX: 31.24 KG/M2 | OXYGEN SATURATION: 98 % | HEIGHT: 64 IN | DIASTOLIC BLOOD PRESSURE: 72 MMHG | HEART RATE: 80 BPM

## 2021-10-06 PROCEDURE — 99213 OFFICE O/P EST LOW 20 MIN: CPT

## 2021-10-06 NOTE — ASSESSMENT
[FreeTextEntry1] : Left hemispheric epilepsy\par TBI\par \par \par plan\par continue current level\par F/U 6 months

## 2021-10-06 NOTE — PHYSICAL EXAM
[FreeTextEntry1] : A/A/Ox \par good humor\par Follows commands\par CN- intact\par + right hemiparesis\par Rt. paretic stable gait

## 2022-04-19 NOTE — DISCHARGE NOTE NURSING/CASE MANAGEMENT/SOCIAL WORK - NSDCPETBCESMAN_GEN_ALL_CORE
Pt c/o blood in urine with pain on urination started Friday
If you are a smoker, it is important for your health to stop smoking. Please be aware that second hand smoke is also harmful.
If you are a smoker, it is important for your health to stop smoking. Please be aware that second hand smoke is also harmful.

## 2023-02-22 ENCOUNTER — APPOINTMENT (OUTPATIENT)
Dept: ENDOCRINOLOGY | Facility: CLINIC | Age: 63
End: 2023-02-22

## 2023-02-27 ENCOUNTER — INPATIENT (INPATIENT)
Facility: HOSPITAL | Age: 63
LOS: 1 days | Discharge: NURSING, REHAB OR LONG-TERM CARE | DRG: 101 | End: 2023-03-01
Attending: STUDENT IN AN ORGANIZED HEALTH CARE EDUCATION/TRAINING PROGRAM | Admitting: INTERNAL MEDICINE
Payer: MEDICARE

## 2023-02-27 VITALS
HEART RATE: 122 BPM | RESPIRATION RATE: 20 BRPM | SYSTOLIC BLOOD PRESSURE: 155 MMHG | TEMPERATURE: 97 F | DIASTOLIC BLOOD PRESSURE: 90 MMHG | OXYGEN SATURATION: 97 %

## 2023-02-27 DIAGNOSIS — R56.9 UNSPECIFIED CONVULSIONS: ICD-10-CM

## 2023-02-27 LAB
ALBUMIN SERPL ELPH-MCNC: 4.5 G/DL — SIGNIFICANT CHANGE UP (ref 3.5–5.2)
ALP SERPL-CCNC: 66 U/L — SIGNIFICANT CHANGE UP (ref 30–115)
ALT FLD-CCNC: 69 U/L — HIGH (ref 0–41)
ANION GAP SERPL CALC-SCNC: 16 MMOL/L — HIGH (ref 7–14)
ANION GAP SERPL CALC-SCNC: 19 MMOL/L — HIGH (ref 7–14)
APTT BLD: 23.5 SEC — CRITICAL LOW (ref 27–39.2)
AST SERPL-CCNC: 44 U/L — HIGH (ref 0–41)
BASE EXCESS BLDV CALC-SCNC: -1.1 MMOL/L — SIGNIFICANT CHANGE UP (ref -2–3)
BASOPHILS # BLD AUTO: 0.03 K/UL — SIGNIFICANT CHANGE UP (ref 0–0.2)
BASOPHILS NFR BLD AUTO: 0.3 % — SIGNIFICANT CHANGE UP (ref 0–1)
BILIRUB SERPL-MCNC: 0.3 MG/DL — SIGNIFICANT CHANGE UP (ref 0.2–1.2)
BUN SERPL-MCNC: 13 MG/DL — SIGNIFICANT CHANGE UP (ref 10–20)
BUN SERPL-MCNC: 14 MG/DL — SIGNIFICANT CHANGE UP (ref 10–20)
CA-I SERPL-SCNC: 1.2 MMOL/L — SIGNIFICANT CHANGE UP (ref 1.15–1.33)
CALCIUM SERPL-MCNC: 10 MG/DL — SIGNIFICANT CHANGE UP (ref 8.4–10.5)
CALCIUM SERPL-MCNC: 9.2 MG/DL — SIGNIFICANT CHANGE UP (ref 8.4–10.5)
CHLORIDE SERPL-SCNC: 100 MMOL/L — SIGNIFICANT CHANGE UP (ref 98–110)
CHLORIDE SERPL-SCNC: 102 MMOL/L — SIGNIFICANT CHANGE UP (ref 98–110)
CHOLEST SERPL-MCNC: 204 MG/DL — HIGH
CK SERPL-CCNC: 130 U/L — SIGNIFICANT CHANGE UP (ref 0–225)
CO2 SERPL-SCNC: 22 MMOL/L — SIGNIFICANT CHANGE UP (ref 17–32)
CO2 SERPL-SCNC: 23 MMOL/L — SIGNIFICANT CHANGE UP (ref 17–32)
CREAT SERPL-MCNC: 0.7 MG/DL — SIGNIFICANT CHANGE UP (ref 0.7–1.5)
CREAT SERPL-MCNC: 0.9 MG/DL — SIGNIFICANT CHANGE UP (ref 0.7–1.5)
EGFR: 105 ML/MIN/1.73M2 — SIGNIFICANT CHANGE UP
EGFR: 97 ML/MIN/1.73M2 — SIGNIFICANT CHANGE UP
EOSINOPHIL # BLD AUTO: 0.01 K/UL — SIGNIFICANT CHANGE UP (ref 0–0.7)
EOSINOPHIL NFR BLD AUTO: 0.1 % — SIGNIFICANT CHANGE UP (ref 0–8)
GAS PNL BLDV: 135 MMOL/L — LOW (ref 136–145)
GAS PNL BLDV: SIGNIFICANT CHANGE UP
GAS PNL BLDV: SIGNIFICANT CHANGE UP
GLUCOSE BLDC GLUCOMTR-MCNC: 112 MG/DL — HIGH (ref 70–99)
GLUCOSE BLDC GLUCOMTR-MCNC: 305 MG/DL — HIGH (ref 70–99)
GLUCOSE SERPL-MCNC: 244 MG/DL — HIGH (ref 70–99)
GLUCOSE SERPL-MCNC: 323 MG/DL — HIGH (ref 70–99)
HCO3 BLDV-SCNC: 24 MMOL/L — SIGNIFICANT CHANGE UP (ref 22–29)
HCT VFR BLD CALC: 42 % — SIGNIFICANT CHANGE UP (ref 42–52)
HCT VFR BLDA CALC: 44 % — SIGNIFICANT CHANGE UP (ref 39–51)
HDLC SERPL-MCNC: 50 MG/DL — SIGNIFICANT CHANGE UP
HGB BLD CALC-MCNC: 14.6 G/DL — SIGNIFICANT CHANGE UP (ref 12.6–17.4)
HGB BLD-MCNC: 14.2 G/DL — SIGNIFICANT CHANGE UP (ref 14–18)
IMM GRANULOCYTES NFR BLD AUTO: 0.5 % — HIGH (ref 0.1–0.3)
INR BLD: 1 RATIO — SIGNIFICANT CHANGE UP (ref 0.65–1.3)
LACTATE BLDV-MCNC: 4.7 MMOL/L — CRITICAL HIGH (ref 0.5–2)
LACTATE SERPL-SCNC: 2.4 MMOL/L — HIGH (ref 0.7–2)
LIPID PNL WITH DIRECT LDL SERPL: 141 MG/DL — HIGH
LYMPHOCYTES # BLD AUTO: 0.8 K/UL — LOW (ref 1.2–3.4)
LYMPHOCYTES # BLD AUTO: 9.1 % — LOW (ref 20.5–51.1)
MCHC RBC-ENTMCNC: 30 PG — SIGNIFICANT CHANGE UP (ref 27–31)
MCHC RBC-ENTMCNC: 33.8 G/DL — SIGNIFICANT CHANGE UP (ref 32–37)
MCV RBC AUTO: 88.6 FL — SIGNIFICANT CHANGE UP (ref 80–94)
MONOCYTES # BLD AUTO: 0.32 K/UL — SIGNIFICANT CHANGE UP (ref 0.1–0.6)
MONOCYTES NFR BLD AUTO: 3.6 % — SIGNIFICANT CHANGE UP (ref 1.7–9.3)
NEUTROPHILS # BLD AUTO: 7.62 K/UL — HIGH (ref 1.4–6.5)
NEUTROPHILS NFR BLD AUTO: 86.4 % — HIGH (ref 42.2–75.2)
NON HDL CHOLESTEROL: 154 MG/DL — HIGH
NRBC # BLD: 0 /100 WBCS — SIGNIFICANT CHANGE UP (ref 0–0)
PCO2 BLDV: 42 MMHG — SIGNIFICANT CHANGE UP (ref 42–55)
PH BLDV: 7.37 — SIGNIFICANT CHANGE UP (ref 7.32–7.43)
PLATELET # BLD AUTO: 208 K/UL — SIGNIFICANT CHANGE UP (ref 130–400)
PO2 BLDV: 38 MMHG — SIGNIFICANT CHANGE UP
POTASSIUM BLDV-SCNC: 4.5 MMOL/L — SIGNIFICANT CHANGE UP (ref 3.5–5.1)
POTASSIUM SERPL-MCNC: 5.1 MMOL/L — HIGH (ref 3.5–5)
POTASSIUM SERPL-MCNC: 5.2 MMOL/L — HIGH (ref 3.5–5)
POTASSIUM SERPL-SCNC: 5.1 MMOL/L — HIGH (ref 3.5–5)
POTASSIUM SERPL-SCNC: 5.2 MMOL/L — HIGH (ref 3.5–5)
PROT SERPL-MCNC: 7.8 G/DL — SIGNIFICANT CHANGE UP (ref 6–8)
PROTHROM AB SERPL-ACNC: 11.4 SEC — SIGNIFICANT CHANGE UP (ref 9.95–12.87)
RBC # BLD: 4.74 M/UL — SIGNIFICANT CHANGE UP (ref 4.7–6.1)
RBC # FLD: 12.3 % — SIGNIFICANT CHANGE UP (ref 11.5–14.5)
SAO2 % BLDV: 69.6 % — SIGNIFICANT CHANGE UP
SARS-COV-2 RNA SPEC QL NAA+PROBE: SIGNIFICANT CHANGE UP
SODIUM SERPL-SCNC: 138 MMOL/L — SIGNIFICANT CHANGE UP (ref 135–146)
SODIUM SERPL-SCNC: 144 MMOL/L — SIGNIFICANT CHANGE UP (ref 135–146)
TRIGL SERPL-MCNC: 64 MG/DL — SIGNIFICANT CHANGE UP
TROPONIN T SERPL-MCNC: <0.01 NG/ML — SIGNIFICANT CHANGE UP
WBC # BLD: 8.82 K/UL — SIGNIFICANT CHANGE UP (ref 4.8–10.8)
WBC # FLD AUTO: 8.82 K/UL — SIGNIFICANT CHANGE UP (ref 4.8–10.8)

## 2023-02-27 PROCEDURE — 86850 RBC ANTIBODY SCREEN: CPT

## 2023-02-27 PROCEDURE — 85027 COMPLETE CBC AUTOMATED: CPT

## 2023-02-27 PROCEDURE — 70498 CT ANGIOGRAPHY NECK: CPT | Mod: 26,MA

## 2023-02-27 PROCEDURE — 82962 GLUCOSE BLOOD TEST: CPT

## 2023-02-27 PROCEDURE — 82550 ASSAY OF CK (CPK): CPT

## 2023-02-27 PROCEDURE — 93010 ELECTROCARDIOGRAM REPORT: CPT | Mod: 77

## 2023-02-27 PROCEDURE — 70450 CT HEAD/BRAIN W/O DYE: CPT

## 2023-02-27 PROCEDURE — 83605 ASSAY OF LACTIC ACID: CPT

## 2023-02-27 PROCEDURE — 80053 COMPREHEN METABOLIC PANEL: CPT

## 2023-02-27 PROCEDURE — 0042T: CPT | Mod: MA

## 2023-02-27 PROCEDURE — 80061 LIPID PANEL: CPT

## 2023-02-27 PROCEDURE — 36415 COLL VENOUS BLD VENIPUNCTURE: CPT

## 2023-02-27 PROCEDURE — 71045 X-RAY EXAM CHEST 1 VIEW: CPT | Mod: 26

## 2023-02-27 PROCEDURE — 99222 1ST HOSP IP/OBS MODERATE 55: CPT

## 2023-02-27 PROCEDURE — 83735 ASSAY OF MAGNESIUM: CPT

## 2023-02-27 PROCEDURE — 70496 CT ANGIOGRAPHY HEAD: CPT | Mod: 26,MA

## 2023-02-27 PROCEDURE — 86900 BLOOD TYPING SEROLOGIC ABO: CPT

## 2023-02-27 PROCEDURE — 93005 ELECTROCARDIOGRAM TRACING: CPT

## 2023-02-27 PROCEDURE — 86901 BLOOD TYPING SEROLOGIC RH(D): CPT

## 2023-02-27 PROCEDURE — 80048 BASIC METABOLIC PNL TOTAL CA: CPT

## 2023-02-27 PROCEDURE — 86592 SYPHILIS TEST NON-TREP QUAL: CPT

## 2023-02-27 PROCEDURE — 86780 TREPONEMA PALLIDUM: CPT

## 2023-02-27 PROCEDURE — 99221 1ST HOSP IP/OBS SF/LOW 40: CPT

## 2023-02-27 PROCEDURE — 83880 ASSAY OF NATRIURETIC PEPTIDE: CPT

## 2023-02-27 PROCEDURE — 95819 EEG AWAKE AND ASLEEP: CPT

## 2023-02-27 PROCEDURE — 99285 EMERGENCY DEPT VISIT HI MDM: CPT | Mod: GC

## 2023-02-27 PROCEDURE — 93010 ELECTROCARDIOGRAM REPORT: CPT

## 2023-02-27 RX ORDER — INSULIN LISPRO 100/ML
5 VIAL (ML) SUBCUTANEOUS
Refills: 0 | Status: DISCONTINUED | OUTPATIENT
Start: 2023-02-27 | End: 2023-03-01

## 2023-02-27 RX ORDER — ENOXAPARIN SODIUM 100 MG/ML
40 INJECTION SUBCUTANEOUS EVERY 24 HOURS
Refills: 0 | Status: DISCONTINUED | OUTPATIENT
Start: 2023-02-27 | End: 2023-03-01

## 2023-02-27 RX ORDER — FOLIC ACID 0.8 MG
1 TABLET ORAL DAILY
Refills: 0 | Status: DISCONTINUED | OUTPATIENT
Start: 2023-02-27 | End: 2023-03-01

## 2023-02-27 RX ORDER — INSULIN LISPRO 100/ML
VIAL (ML) SUBCUTANEOUS
Refills: 0 | Status: DISCONTINUED | OUTPATIENT
Start: 2023-02-27 | End: 2023-03-01

## 2023-02-27 RX ORDER — LEVETIRACETAM 250 MG/1
1000 TABLET, FILM COATED ORAL ONCE
Refills: 0 | Status: COMPLETED | OUTPATIENT
Start: 2023-02-27 | End: 2023-02-27

## 2023-02-27 RX ORDER — ATORVASTATIN CALCIUM 80 MG/1
20 TABLET, FILM COATED ORAL AT BEDTIME
Refills: 0 | Status: DISCONTINUED | OUTPATIENT
Start: 2023-02-27 | End: 2023-03-01

## 2023-02-27 RX ORDER — SODIUM CHLORIDE 9 MG/ML
1000 INJECTION, SOLUTION INTRAVENOUS
Refills: 0 | Status: DISCONTINUED | OUTPATIENT
Start: 2023-02-27 | End: 2023-03-01

## 2023-02-27 RX ORDER — SODIUM CHLORIDE 9 MG/ML
1000 INJECTION, SOLUTION INTRAVENOUS
Refills: 0 | Status: DISCONTINUED | OUTPATIENT
Start: 2023-02-27 | End: 2023-02-27

## 2023-02-27 RX ORDER — SODIUM CHLORIDE 9 MG/ML
1000 INJECTION, SOLUTION INTRAVENOUS ONCE
Refills: 0 | Status: COMPLETED | OUTPATIENT
Start: 2023-02-27 | End: 2023-02-27

## 2023-02-27 RX ORDER — DEXTROSE 50 % IN WATER 50 %
25 SYRINGE (ML) INTRAVENOUS ONCE
Refills: 0 | Status: DISCONTINUED | OUTPATIENT
Start: 2023-02-27 | End: 2023-03-01

## 2023-02-27 RX ORDER — SENNA PLUS 8.6 MG/1
2 TABLET ORAL AT BEDTIME
Refills: 0 | Status: DISCONTINUED | OUTPATIENT
Start: 2023-02-27 | End: 2023-03-01

## 2023-02-27 RX ORDER — LEVETIRACETAM 250 MG/1
1000 TABLET, FILM COATED ORAL EVERY 12 HOURS
Refills: 0 | Status: DISCONTINUED | OUTPATIENT
Start: 2023-02-27 | End: 2023-03-01

## 2023-02-27 RX ORDER — GLUCAGON INJECTION, SOLUTION 0.5 MG/.1ML
1 INJECTION, SOLUTION SUBCUTANEOUS ONCE
Refills: 0 | Status: DISCONTINUED | OUTPATIENT
Start: 2023-02-27 | End: 2023-03-01

## 2023-02-27 RX ORDER — SODIUM ZIRCONIUM CYCLOSILICATE 10 G/10G
10 POWDER, FOR SUSPENSION ORAL ONCE
Refills: 0 | Status: COMPLETED | OUTPATIENT
Start: 2023-02-27 | End: 2023-02-27

## 2023-02-27 RX ORDER — LEVETIRACETAM 250 MG/1
1000 TABLET, FILM COATED ORAL
Refills: 0 | Status: DISCONTINUED | OUTPATIENT
Start: 2023-02-27 | End: 2023-02-27

## 2023-02-27 RX ORDER — INSULIN GLARGINE 100 [IU]/ML
30 INJECTION, SOLUTION SUBCUTANEOUS AT BEDTIME
Refills: 0 | Status: DISCONTINUED | OUTPATIENT
Start: 2023-02-27 | End: 2023-03-01

## 2023-02-27 RX ORDER — DEXTROSE 50 % IN WATER 50 %
15 SYRINGE (ML) INTRAVENOUS ONCE
Refills: 0 | Status: DISCONTINUED | OUTPATIENT
Start: 2023-02-27 | End: 2023-03-01

## 2023-02-27 RX ORDER — DEXTROSE 50 % IN WATER 50 %
12.5 SYRINGE (ML) INTRAVENOUS ONCE
Refills: 0 | Status: DISCONTINUED | OUTPATIENT
Start: 2023-02-27 | End: 2023-03-01

## 2023-02-27 RX ORDER — THIAMINE MONONITRATE (VIT B1) 100 MG
100 TABLET ORAL DAILY
Refills: 0 | Status: DISCONTINUED | OUTPATIENT
Start: 2023-02-27 | End: 2023-03-01

## 2023-02-27 RX ORDER — OLANZAPINE 15 MG/1
2.5 TABLET, FILM COATED ORAL AT BEDTIME
Refills: 0 | Status: DISCONTINUED | OUTPATIENT
Start: 2023-02-27 | End: 2023-03-01

## 2023-02-27 RX ORDER — MULTIVIT-MIN/FERROUS GLUCONATE 9 MG/15 ML
1 LIQUID (ML) ORAL DAILY
Refills: 0 | Status: DISCONTINUED | OUTPATIENT
Start: 2023-02-27 | End: 2023-03-01

## 2023-02-27 RX ADMIN — LEVETIRACETAM 400 MILLIGRAM(S): 250 TABLET, FILM COATED ORAL at 23:23

## 2023-02-27 RX ADMIN — SODIUM CHLORIDE 1000 MILLILITER(S): 9 INJECTION, SOLUTION INTRAVENOUS at 12:29

## 2023-02-27 RX ADMIN — OLANZAPINE 2.5 MILLIGRAM(S): 15 TABLET, FILM COATED ORAL at 23:23

## 2023-02-27 RX ADMIN — Medication 8: at 19:12

## 2023-02-27 RX ADMIN — ATORVASTATIN CALCIUM 20 MILLIGRAM(S): 80 TABLET, FILM COATED ORAL at 23:23

## 2023-02-27 RX ADMIN — INSULIN GLARGINE 30 UNIT(S): 100 INJECTION, SOLUTION SUBCUTANEOUS at 23:53

## 2023-02-27 RX ADMIN — LEVETIRACETAM 400 MILLIGRAM(S): 250 TABLET, FILM COATED ORAL at 11:47

## 2023-02-27 RX ADMIN — Medication 5 MILLIGRAM(S): at 15:00

## 2023-02-27 RX ADMIN — SODIUM CHLORIDE 100 MILLILITER(S): 9 INJECTION, SOLUTION INTRAVENOUS at 19:07

## 2023-02-27 RX ADMIN — SODIUM ZIRCONIUM CYCLOSILICATE 10 GRAM(S): 10 POWDER, FOR SUSPENSION ORAL at 23:24

## 2023-02-27 RX ADMIN — Medication 5 UNIT(S): at 19:12

## 2023-02-27 RX ADMIN — SODIUM CHLORIDE 1000 MILLILITER(S): 9 INJECTION, SOLUTION INTRAVENOUS at 13:30

## 2023-02-27 NOTE — ED PROVIDER NOTE - PHYSICAL EXAMINATION
CONSTITUTIONAL: well-appearing, in NAD  SKIN: Warm dry, normal skin turgor  HEAD: NCAT  EYES: EOMI, PERRLA, no scleral icterus, conjunctiva pink  ENT: normal pharynx with no erythema or exudates  NECK: Supple; non tender. Full ROM.  CARD: RRR, no murmurs.  RESP: clear to ausculation b/l. No crackles or wheezing.  ABD: soft, non-tender, non-distended, no rebound or guarding.  EXT: Full ROM, no bony tenderness, no pedal edema, no calf tenderness  NEURO: decreased strength and sensation of R upper and lower extremities; R sided facial droop  PSYCH: Cooperative, appropriate.

## 2023-02-27 NOTE — ED ADULT NURSE NOTE - CHIEF COMPLAINT QUOTE
Pt here with R sided weakness, R facial droop starting ~915am LKW 7am as per ems. Pt with witnessed tonic clonic seizure by EMS after ems arrival . Pt awake with slurred speech. bgl 225

## 2023-02-27 NOTE — ED ADULT TRIAGE NOTE - CHIEF COMPLAINT QUOTE
Pt here with R sided weakness, R facial droop starting ~915am. Pt with witnessed tonic clonic seizure by EMS after ems arrival . Pt awake with slurred speech. bgl 225 Pt here with R sided weakness, R facial droop starting ~915am LKW 7am as per ems. Pt with witnessed tonic clonic seizure by EMS after ems arrival . Pt awake with slurred speech. bgl 225

## 2023-02-27 NOTE — ED PROVIDER NOTE - PROGRESS NOTE DETAILS
ED Attending ANAMIKA Rodriguez  pt with improved of R sided weakness. Dr. Tristan Carrion at bedside. BK: Dr. Hawthorne at bedside. Patient has previously had RUE weakness when seen previously. No acute stroke on CT, patient to be admitted to medicine for rEEG.

## 2023-02-27 NOTE — ED PROVIDER NOTE - OBJECTIVE STATEMENT
61 yo M with PMH of TBI (AAOX2-3 at baseline), anxiety, epilepsy on keppra, 63 yo M with PMH of TBI (AAOX2-3 at baseline), anxiety, epilepsy on keppra presenting for R sided weakness that started at 7 am. Per EMS, patient had seizure upon arrival which self resolved. Patient denies fever, fall, HT, LOC, AC, drug/alcohol use.

## 2023-02-27 NOTE — ED PROVIDER NOTE - CLINICAL SUMMARY MEDICAL DECISION MAKING FREE TEXT BOX
62-year-old male with past medical history of TBI, anxiety, epilepsy on Keppra, brought in by EMS for right-sided weakness last known well was 7 AM this morning.  Reports symptoms started at 9:15 AM.  EMS reports when they arrived patient experienced a tonic-clonic seizure, did not have any medications given resolved on its own.  Code stroke in triage. Pt not providing detailed ROS, stating he does not know the date, his ,  or where he is but proving his full name.     on exam:   Constitutional: non toxic appering pt sitting on stretcher in nad.  Skin: no rash, no signs of trauma:  HEENT: PERRL, EOM intact, no nystagmus, mmm. No tongue deviation./   NECK and BACK: neck supple, no spinous ttp to neck or back, FROM, no palpable shelves or step offs, no meningeal signs.  CARDIO: regular rate, radial pulses 2/4 b/l, dp and pt pulses 2/4 b/l.  Lungs: Ctabl w/ breath sounds present b/l, no wheezing or crackles, no accessory muscle use, no tachypnea, no stridor  ABD: BS present throughout all 4 quadrants, abd soft, nd, nt, no rebound tenderness or guarding, no cvat,;  EXT: No movement of RUE and RLE, (+) drift of LLE. no calf pain/swelling/erythema.  NEURO: AAOx1, mild slurring of speech. Motor 5/5 of RUE and RLE, 2/5 of LUE and LLE, sensation intact throughout upper and lower ext. CN II-XII intact. No facial droop (-) NIHSS ~ 12.    Plan: Monitor, FS, CT  head, imaging, labs, neurology consult, reassess.     Labs and EKG were ordered and reviewed.  Imaging was ordered and reviewed by me.  Appropriate medications for patient's presenting complaints were ordered and effects were reassessed.  Patient's records (prior hospital, ED visit) were reviewed.  Additional history was obtained fromPatient requires inpatient hospitalization - monitored setting. 62-year-old male with past medical history of TBI, anxiety, epilepsy on Keppra, brought in by EMS for right-sided weakness last known well was 7 AM this morning.  Reports symptoms started at 9:15 AM.  EMS reports when they arrived patient experienced a tonic-clonic seizure, did not have any medications given resolved on its own.  Code stroke in triage. Pt not providing detailed ROS, stating he does not know the date, his ,  or where he is but proving his full name.     on exam:   Constitutional: non toxic appearing pt sitting on stretcher in nad.  Skin: no rash, no signs of trauma:  HEENT: PERRL, EOM intact, no nystagmus, mmm. No tongue deviation./   NECK and BACK: neck supple, no spinous ttp to neck or back, FROM, no palpable shelves or step offs, no meningeal signs.  CARDIO: regular rate, radial pulses 2/4 b/l, dp and pt pulses 2/4 b/l.  Lungs: Ctabl w/ breath sounds present b/l, no wheezing or crackles, no accessory muscle use, no tachypnea, no stridor  ABD: BS present throughout all 4 quadrants, abd soft, nd, nt, no rebound tenderness or guarding, no cvat,;  EXT: No movement of RUE and RLE, (+) drift of LLE. no calf pain/swelling/erythema.  NEURO: AAOx1, mild slurring of speech. Motor 5/5 of RUE and RLE, 2/5 of LUE and LLE, sensation intact throughout upper and lower ext. CN II-XII intact. No facial droop (-) NIHSS ~ 12.    Plan: Monitor, FS, CT  head, imaging, labs, neurology consult, reassess.     Labs and EKG were ordered and reviewed.  Imaging was ordered and reviewed by me.  Appropriate medications for patient's presenting complaints were ordered and effects were reassessed.  Patient's records (prior hospital, ED visit) were reviewed.  Additional history was obtained from. Patient requires inpatient hospitalization - monitored setting.

## 2023-02-27 NOTE — ED PROVIDER NOTE - CONSIDERATION OF ADMISSION OBSERVATION
Consideration of Admission/Observation Escalation to admission/observation was considered. Patient requires inpatient hospitalization - monitored setting.

## 2023-02-27 NOTE — H&P ADULT - HISTORY OF PRESENT ILLNESS
61 yo M with PMH of HTN, DLD, IDDM, seizure disorder, schizophrenia, TBI with residual R sided weakness, psychoactive substance abuse? and anxiety [facility] R sided weakness and confusion. During my encounter pt is severely agitated and not amendable to talk or answer any questions, using abusive words. History obtained from NH facility. History goes back to the morning of admission where pt was found to be confused, they reported as "AMS" and with more sever RUE and RLE weakness. At [facility] pt is AAOx1-2 and communicates with yes/no and able to ambulates on his own as per NH staff and brother. Per EMS, patient had seizure upon arrival which self resolved. No reported fever, chills, abdominal discomfort, cough or urinary symptoms.  In ED, s/p Code Stroke, NIHSS 11, evaluated by Neurology team and had imaging done reported below:    In ED,  VT: bp 155/90, , RR 24, SpO2 96% on 2L?  Labs significant for: K 5.2 (H), Glu 323, Lac 4.7    CT Angio Brain Stroke Protocol  w/ IV Cont   1.  No evidence of acute large vessel occlusion. Normal perfusion images.  2.  Stable scattered atheromatous changes with up to about 50% stenosis of the proximal left ICA and moderate stenosis of bilateral carotid siphons.      CT Brain Stroke Protocol   1.  No evidence of acute intracranial hemorrhage or large territory infarct.  2.  Stable chronic infarct in the left basal ganglia. Stable mild chronic microvascular changes.    Xray Chest 1 View AP/PA Mild congestive changes centrally.    s/p 1 g Keppra IV and I L IVF bolus in ED

## 2023-02-27 NOTE — CONSULT NOTE ADULT - NS ATTEND AMEND GEN_ALL_CORE FT
Patient seen and examined and agree with above except as noted.  Patients history, notes, labs, imaging, vitals and meds reviewed personally.  Patient presents with witnessed seizure and told worsening right sided weakness.  Last seen in 2020 and exam shows similar weakness at that time as this current presentation.    Plan as above

## 2023-02-27 NOTE — H&P ADULT - NSHPLABSRESULTS_GEN_ALL_CORE
CT Angio Brain Stroke Protocol  w/ IV Cont   1.  No evidence of acute large vessel occlusion. Normal perfusion images.  2.  Stable scattered atheromatous changes with up to about 50% stenosis of the proximal left ICA and moderate stenosis of bilateral carotid siphons.      CT Brain Stroke Protocol   1.  No evidence of acute intracranial hemorrhage or large territory infarct.  2.  Stable chronic infarct in the left basal ganglia. Stable mild chronic microvascular changes.    Xray Chest 1 View AP/PA Mild congestive changes centrally.                            14.2   8.82  )-----------( 208      ( 27 Feb 2023 10:35 )             42.0       02-27    138  |  100  |  14  ----------------------------<  323<H>  5.2<H>   |  22  |  0.9    Ca    9.2      27 Feb 2023 10:35    TPro  7.8  /  Alb  4.5  /  TBili  0.3  /  DBili  x   /  AST  44<H>  /  ALT  69<H>  /  AlkPhos  66  02-27                  PT/INR - ( 27 Feb 2023 10:35 )   PT: 11.40 sec;   INR: 1.00 ratio         PTT - ( 27 Feb 2023 10:35 )  PTT:23.5 sec    Lactate Trend      CARDIAC MARKERS ( 27 Feb 2023 10:35 )  x     / <0.01 ng/mL / x     / x     / x            CAPILLARY BLOOD GLUCOSE  295 (27 Feb 2023 13:04)      POCT Blood Glucose.: 295 mg/dL (27 Feb 2023 10:19)

## 2023-02-27 NOTE — ED PROVIDER NOTE - ATTENDING CONTRIBUTION TO CARE
62-year-old male with past medical history of TBI, anxiety, epilepsy on Keppra, brought in by EMS for right-sided weakness last known well was 7 AM this morning.  Reports symptoms started at 9:15 AM.  EMS reports when they arrived patient experienced a tonic-clonic seizure, did not have any medications given resolved on its own.  Code stroke in triage. 62-year-old male with past medical history of TBI, anxiety, epilepsy on Keppra, brought in by EMS for right-sided weakness last known well was 7 AM this morning.  Reports symptoms started at 9:15 AM.  EMS reports when they arrived patient experienced a tonic-clonic seizure, did not have any medications given resolved on its own.  Code stroke in triage. Pt not providing detailed ROS, stating he does not know the date, his ,  or where he is but proving his full name.     on exam:   Constitutional: non toxic appering pt sitting on stretcher in nad.  Skin: no rash, no signs of trauma:  HEENT: PERRL, EOM intact, no nystagmus, mmm. No tongue deviation./   NECK and BACK: neck supple, no spinous ttp to neck or back, FROM, no palpable shelves or step offs, no meningeal signs.  CARDIO: regular rate, radial pulses 2/4 b/l, dp and pt pulses 2/4 b/l.  Lungs: Ctabl w/ breath sounds present b/l, no wheezing or crackles, no accessory muscle use, no tachypnea, no stridor  ABD: BS present throughout all 4 quadrants, abd soft, nd, nt, no rebound tenderness or guarding, no cvat,;  EXT: No movement of RUE and RLE, (+) drift of LLE. no calf pain/swelling/erythema.  NEURO: AAOx1, mild slurring of speech. Motor 5/5 of RUE and RLE, 2/5 of LUE and LLE, sensation intact throughout upper and lower ext. CN II-XII intact. No facial droop (-) NIHSS ~ 12.    Plan: Monitor, FS, CT  head, imaging, labs, neurology consult, reassess. 62-year-old male with past medical history of TBI, anxiety, epilepsy on Keppra, brought in by EMS from nursing facility for right-sided weakness last known well was 7 AM this morning.  Reports symptoms started at 9:15 AM.  EMS reports when they arrived patient experienced a tonic-clonic seizure, did not have any medications given resolved on its own.  Code stroke in triage. Pt not providing detailed ROS, stating he does not know the date, his ,  or where he is but proving his full name.     on exam:   Constitutional: non toxic appering pt sitting on stretcher in nad.  Skin: no rash, no signs of trauma:  HEENT: PERRL, EOM intact, no nystagmus, mmm. No tongue deviation./   NECK and BACK: neck supple, no spinous ttp to neck or back, FROM, no palpable shelves or step offs, no meningeal signs.  CARDIO: regular rate, radial pulses 2/4 b/l, dp and pt pulses 2/4 b/l.  Lungs: Ctabl w/ breath sounds present b/l, no wheezing or crackles, no accessory muscle use, no tachypnea, no stridor  ABD: BS present throughout all 4 quadrants, abd soft, nd, nt, no rebound tenderness or guarding, no cvat,;  EXT: No movement of RUE and RLE, (+) drift of LLE. no calf pain/swelling/erythema.  NEURO: AAOx1, mild slurring of speech. Motor 5/5 of RUE and RLE, 2/5 of LUE and LLE, sensation intact throughout upper and lower ext. CN II-XII intact. No facial droop (-) NIHSS ~ 12.    Plan: Monitor, FS, CT  head, imaging, labs, neurology consult, reassess. 62-year-old male with past medical history of TBI, anxiety, epilepsy on Keppra, brought in by EMS from nursing facility for right-sided weakness last known well was 7 AM this morning.  Reports symptoms started at 9:15 AM.  EMS reports when they arrived patient experienced a tonic-clonic seizure, did not have any medications given resolved on its own.  Code stroke in triage. Pt not providing detailed ROS, stating he does not know the date, his ,  or where he is but proving his full name.     on exam:   Constitutional: non toxic appearing pt sitting on stretcher in nad.  Skin: no rash, no signs of trauma:  HEENT: PERRL, EOM intact, no nystagmus, mmm. No tongue deviation./   NECK and BACK: neck supple, no spinous ttp to neck or back, FROM, no palpable shelves or step offs, no meningeal signs.  CARDIO: regular rate, radial pulses 2/4 b/l, dp and pt pulses 2/4 b/l.  Lungs: Ctabl w/ breath sounds present b/l, no wheezing or crackles, no accessory muscle use, no tachypnea, no stridor  ABD: BS present throughout all 4 quadrants, abd soft, nd, nt, no rebound tenderness or guarding, no cvat,;  EXT: No movement of RUE and RLE, (+) drift of LLE. no calf pain/swelling/erythema.  NEURO: AAOx1, mild slurring of speech. Motor 5/5 of RUE and RLE, 2/5 of LUE and LLE, sensation intact throughout upper and lower ext. CN II-XII intact. No facial droop (-) NIHSS ~ 12.    Plan: Monitor, FS, CT  head, imaging, labs, neurology consult, reassess.

## 2023-02-27 NOTE — CONSULT NOTE ADULT - SUBJECTIVE AND OBJECTIVE BOX
Neurology Consult    Patient is a 62y old  Male who presents with a chief complaint of seizure, r/o CVA (27 Feb 2023 15:00)    HPI:    61 yo M with PMH of TBI (AAOX2-3 at baseline), anxiety, epilepsy on keppra presenting for R sided weakness that started at 7 am. Per EMS, patient had seizure upon arrival which self resolved. Stroke code activated and neurology consulted. Spoke with nurse manager [phone] states patient at base line AOX 1-2 walks with supervision drags right leg and feeds him self with right hand LKW 9:15 am found to have right sided weakness. Collateral from EMT team witnessing pt unresponsive, shaking lasting 30 seconds, eyes gaze to the right, did not witness bite tongue, and is incontinent. As per nurse patient is compliant with his seizure medications and has not had a seizure for a while, denies patient having recent illness. Upon presentation patient presented with right sided weakness, slightly aphasic and agitated, and does not want to speak, stating to leave him alone. In ED,bp 155/90, , RR 24, SpO2 96% on 2L? Labs significant for: K 5.2 (H), Glu 323, Lac 4.7. s/p 1 g Keppra IV and I L IVF bolus in ED      PAST MEDICAL & SURGICAL HISTORY:  Seizure  Brain aneurysm  Anxiety  TBI (traumatic brain injury)    No significant past surgical history    Allergies    No Known Allergies    MEDICATIONS  (STANDING):  atorvastatin 20 milliGRAM(s) Oral at bedtime  dextrose 5%. 1000 milliLiter(s) (100 mL/Hr) IV Continuous <Continuous>  dextrose 5%. 1000 milliLiter(s) (50 mL/Hr) IV Continuous <Continuous>  dextrose 50% Injectable 25 Gram(s) IV Push once  dextrose 50% Injectable 12.5 Gram(s) IV Push once  dextrose 50% Injectable 25 Gram(s) IV Push once  enalapril 5 milliGRAM(s) Oral daily  enoxaparin Injectable 40 milliGRAM(s) SubCutaneous every 24 hours  folic acid 1 milliGRAM(s) Oral daily  glucagon  Injectable 1 milliGRAM(s) IntraMuscular once  insulin glargine Injectable (LANTUS) 30 Unit(s) SubCutaneous at bedtime  insulin lispro (ADMELOG) corrective regimen sliding scale   SubCutaneous three times a day before meals  insulin lispro Injectable (ADMELOG) 5 Unit(s) SubCutaneous three times a day before meals  lactated ringers. 1000 milliLiter(s) (100 mL/Hr) IV Continuous <Continuous>  levETIRAcetam  IVPB 1000 milliGRAM(s) IV Intermittent every 12 hours  multivitamin/minerals 1 Tablet(s) Oral daily  OLANZapine 2.5 milliGRAM(s) Oral at bedtime  senna 2 Tablet(s) Oral at bedtime  thiamine 100 milliGRAM(s) Oral daily    MEDICATIONS  (PRN):  dextrose Oral Gel 15 Gram(s) Oral once PRN Blood Glucose LESS THAN 70 milliGRAM(s)/deciliter    Vital Signs Last 24 Hrs  T(C): 37.3 (27 Feb 2023 15:14), Max: 37.3 (27 Feb 2023 15:14)  T(F): 99.1 (27 Feb 2023 15:14), Max: 99.1 (27 Feb 2023 15:14)  HR: 119 (27 Feb 2023 15:39) (113 - 124)  BP: 135/81 (27 Feb 2023 15:39) (135/81 - 155/90)  BP(mean): --  RR: 22 (27 Feb 2023 15:39) (19 - 24)  SpO2: 97% (27 Feb 2023 15:39) (96% - 100%)    Parameters below as of 27 Feb 2023 15:39  Patient On (Oxygen Delivery Method): room air      Examination:  General:  Appearance is consistent with chronologic age.  No abnormal facies.  Gross skin survey within normal limits.    Cognitive/ Language:  The patient is oriented to person does not know age refusing to talk, + slurred speech , moderate aphasia  Eyes: Unalble to examine patient non compliant did say he sees my hand, no ovbious nystagmus,  PERRL.  No ptosis/weakness of eyelid closure.    Face:  Facial sensation normal V1 - 3, no facial asymmetry.    Ears/Nose/Throat:  Hearing grossly intact b/l.   Motor examination:   Normal tone, bulk and range of motion.  No tenderness, twitching, tremors or involuntary movements.  Formal Muscle Strength Testing: Right arm 0-1/5, Left arm 4/5, Right leg 0/5, Left leg 4/5 , no observable drift.    Reflexes:   2+ b/l pectoralis, patella and Achilles. clonus absent, negative babinski sign  Sensory examination:   Intact to light touch in all extremities.  Cerebellum:   unable to obtain pt refusing to follow command   Gait deferred for safety of patient.    Labs:   CBC Full  -  ( 27 Feb 2023 10:35 )  WBC Count : 8.82 K/uL  RBC Count : 4.74 M/uL  Hemoglobin : 14.2 g/dL  Hematocrit : 42.0 %  Platelet Count - Automated : 208 K/uL  Mean Cell Volume : 88.6 fL  Mean Cell Hemoglobin : 30.0 pg  Mean Cell Hemoglobin Concentration : 33.8 g/dL  Auto Neutrophil # : 7.62 K/uL  Auto Lymphocyte # : 0.80 K/uL  Auto Monocyte # : 0.32 K/uL  Auto Eosinophil # : 0.01 K/uL  Auto Basophil # : 0.03 K/uL  Auto Neutrophil % : 86.4 %  Auto Lymphocyte % : 9.1 %  Auto Monocyte % : 3.6 %  Auto Eosinophil % : 0.1 %  Auto Basophil % : 0.3 %    02-27    138  |  100  |  14  ----------------------------<  323<H>  5.2<H>   |  22  |  0.9    Ca    9.2      27 Feb 2023 10:35    TPro  7.8  /  Alb  4.5  /  TBili  0.3  /  DBili  x   /  AST  44<H>  /  ALT  69<H>  /  AlkPhos  66  02-27    LIVER FUNCTIONS - ( 27 Feb 2023 10:35 )  Alb: 4.5 g/dL / Pro: 7.8 g/dL / ALK PHOS: 66 U/L / ALT: 69 U/L / AST: 44 U/L / GGT: x           PT/INR - ( 27 Feb 2023 10:35 )   PT: 11.40 sec;   INR: 1.00 ratio     PTT - ( 27 Feb 2023 10:35 )  PTT:23.5 sec      [facility]  [facility]:     02-27-23 @ 16:56  < from: CT Brain Stroke Protocol (02.27.23 @ 10:36) >  IMPRESSION:    1.  No evidence of acute intracranial hemorrhage or large territory   infarct.    2.  Stable chronic infarct in the left basal ganglia. Stable mild chronic   microvascular changes.    < end of copied text >  < from: CT Brain Perfusion Maps Stroke (02.27.23 @ 11:01) >  .  No evidence of acute large vessel occlusion. Normal perfusion images.    2.  Stable scattered atheromatous changes with up to about 50% stenosis   of the proximal left ICA and moderate stenosis of bilateral carotid   siphons.    < end of copied text >

## 2023-02-27 NOTE — CONSULT NOTE ADULT - ASSESSMENT
63 yo M with PMH of TBI (AAOX2-3 at baseline), anxiety, epilepsy on keppra presenting for R sided weakness that started at 7 am. Per EMS, patient had seizure upon arrival which self resolved. Stroke code activated and neurology consulted. Spoke with nurse manager [phone] states patient at base line AOX 1-2 walks with supervision drags right leg and feeds him self with right hand LKW 9:15 am found to have right sided weakness. Collateral from EMT team witnessing pt unresponsive, shaking lasting 30 seconds, eyes gaze to the right, did not witness bite tongue, and is incontinent. As per nurse patient is compliant with his seizure medications and has not had a seizure for a while, denies patient having recent illness. In ED,bp 155/90, , RR 24, SpO2 96% on 2L? Labs significant for: K 5.2 (H), Glu 323, Lac 4.7. s/p 1 g Keppra IV and I L IVF bolus in ED. Upon presentation patient presented with right sided weakness, slightly aphasic and agitated, and does not want to speak, stating to leave him alone. On exam: no gaze preference patient incontinent, no observed tongue laceration, no obvious asterxis, pupils normal, no facial droop.   CTH shows acute pathology. Stable mild chronic microvascular changes. CTA no LVO, stable scattered atheromatous changes with up to about 50% stenosis of the proximal left ICA and moderate stenosis of bilateral carotid siphons. Patient symptoms indicate patient most probably had tonic clonic seizure vs TIA.     Suggestions:    EEG  TTE  S/P Keppra load  AED level  Continue AED medications  Avoid medication lowering seizure threshold   Monitor NA+CA+glucose, Magnesium  Check for vit insufficiency thiamine, B6  If suspect infection do infection work up  Keep hydrated  Seizure precautions  Fall Precautions  Ketogenic diet   Admit to medicine   Neurology will follow  Case discussed with attending              61 yo M with PMH of TBI (AAOX2-3 at baseline), anxiety, epilepsy on keppra presenting for R sided weakness that started at 7 am. Per EMS, patient had seizure upon arrival which self resolved. Stroke code activated and neurology consulted. Spoke with nurse manager [phone] states patient at base line AOX 1-2 walks with supervision drags right leg and feeds him self with right hand LKW 9:15 am found to have right sided weakness. Collateral from EMT team witnessing pt unresponsive, shaking lasting 30 seconds, eyes gaze to the right, did not witness bite tongue, and is incontinent. As per nurse patient is compliant with his seizure medications and has not had a seizure for a while, denies patient having recent illness. In ED,bp 155/90, , RR 24, SpO2 96% on 2L? Labs significant for: K 5.2 (H), Glu 323, Lac 4.7. s/p 1 g Keppra IV and I L IVF bolus in ED. Upon presentation patient presented with right sided weakness, slightly aphasic and agitated, and does not want to speak, stating to leave him alone. On exam: no gaze preference patient incontinent, no observed tongue laceration, no obvious asterxis, pupils normal, no facial droop.   CTH shows acute pathology. Stable mild chronic microvascular changes. CTA no LVO, stable scattered atheromatous changes with up to about 50% stenosis of the proximal left ICA and moderate stenosis of bilateral carotid siphons. Patient symptoms indicate patient most probably had tonic clonic seizure vs TIA.     Suggestions:    EEG  Repeat CTH in AM  S/P Keppra load  AED level  Continue AED medications  Avoid medication lowering seizure threshold   Monitor NA+CA+glucose, Magnesium  Check for vit insufficiency thiamine, B6  If suspect infection do infection work up  Keep hydrated  Seizure precautions  Fall Precautions  Ketogenic diet   Admit to medicine   Neurology will follow  Case discussed with attending

## 2023-02-27 NOTE — ED ADULT NURSE NOTE - OBJECTIVE STATEMENT
BIBA from [facility]. Stroke code called @1022. LKW 7am. Tonic clonic seizure at 915am while in bus. Patient presents with Right sided weakness & right facial droop w/ slurred speech. A&Ox1. Only following simple commands. Placed on cardia monitor and continues pulse ox. Transported to CT scan immediately. LAC 18 placed w/ labs drawn.

## 2023-02-28 LAB
GLUCOSE BLDC GLUCOMTR-MCNC: 180 MG/DL — HIGH (ref 70–99)
GLUCOSE BLDC GLUCOMTR-MCNC: 237 MG/DL — HIGH (ref 70–99)
GLUCOSE BLDC GLUCOMTR-MCNC: 334 MG/DL — HIGH (ref 70–99)
LACTATE SERPL-SCNC: 2.7 MMOL/L — HIGH (ref 0.7–2)

## 2023-02-28 PROCEDURE — 70450 CT HEAD/BRAIN W/O DYE: CPT | Mod: 26

## 2023-02-28 PROCEDURE — 99232 SBSQ HOSP IP/OBS MODERATE 35: CPT

## 2023-02-28 RX ADMIN — Medication 2: at 08:18

## 2023-02-28 RX ADMIN — Medication 5 MILLIGRAM(S): at 06:29

## 2023-02-28 RX ADMIN — LEVETIRACETAM 400 MILLIGRAM(S): 250 TABLET, FILM COATED ORAL at 17:09

## 2023-02-28 RX ADMIN — Medication 1 MILLIGRAM(S): at 11:21

## 2023-02-28 RX ADMIN — Medication 1 TABLET(S): at 11:22

## 2023-02-28 RX ADMIN — Medication 4: at 17:08

## 2023-02-28 RX ADMIN — Medication 5 UNIT(S): at 11:19

## 2023-02-28 RX ADMIN — Medication 100 MILLIGRAM(S): at 11:22

## 2023-02-28 RX ADMIN — Medication 8: at 11:18

## 2023-02-28 RX ADMIN — Medication 5 UNIT(S): at 17:08

## 2023-02-28 RX ADMIN — ENOXAPARIN SODIUM 40 MILLIGRAM(S): 100 INJECTION SUBCUTANEOUS at 11:13

## 2023-02-28 RX ADMIN — Medication 5 UNIT(S): at 08:18

## 2023-02-28 RX ADMIN — LEVETIRACETAM 400 MILLIGRAM(S): 250 TABLET, FILM COATED ORAL at 11:18

## 2023-02-28 RX ADMIN — SODIUM CHLORIDE 100 MILLILITER(S): 9 INJECTION, SOLUTION INTRAVENOUS at 09:20

## 2023-02-28 NOTE — PROGRESS NOTE ADULT - ASSESSMENT
61 yo M with PMH of HTN, DLD, IDDM, seizure disorder, schizophrenia, TBI with residual R sided weakness, psychoactive substance abuse? and anxiety brought [facility] by [facility] for worsening R sided weakness and confusion. s/p Code Stroke in ED, NIHSS 11, evaluated by Neurology team, no evidence of acute CVA, admitted for further management of seizure disorder.    #Episode of Seizure in ED - per EMS  #s/p Code Stroke- Stable chronic infarct in the left basal ganglia- with Chronic R sided residual weakness   #Acute CVA ruled out   #Hx of TBI with Chronic R sided residual weakness   #Lactic acidosis likely from seizure episode  - NIHSS 11  - CT Angio Brain Stroke Protocol  w/ IV Cont - negative any acute finds  - Repeat CT head ordered 2/28  - at NH on Keppra 1 g BID  - check Keppra level, c/w Home dose for now  - neuro consult appreciated   - f/u REEG - inprogress  - c/w Atorvastatin 20 mg, check Lipid profile  - c/w IVF and trend Lactate    #Hx of schizophrenia  - c/w Home medication: Olanzapine 2.5 mg QHS  - Agitated in ED- c/w constant observation  - Consider Psychiatry consult if not improved     #HTN  #DLD  - c/w Home medication: Enalapril 5 mg daily and Lipitor 20 mg  - check K and hold ACEI if elevated    #IDDM  - Home: Lantus 35 U and Lispro 5 U QAC and SS  - Check A1C  - Started on Insulin 30/5/5/5 & SS  - Monitor FS and start Insulin SS, BB accordingly  - Avoid Hypoglycemia  - Target FS (140-180)    #Tachycardia likely from agitation and/or dehydration  #Fluid overload  - bnp ordered  - consider TTE if bnp is elevated     DVT Ppx: Lovenox 40mg SQ QD  GI Ppx: N/A  Diet: DASH/CC  Activity: bedrest  Dispo: From NH- psych unit  Pending: repeat head ct, eeg, bnp, bmp, lactate    61 yo M with PMH of HTN, DLD, IDDM, seizure disorder, schizophrenia, TBI with residual R sided weakness, psychoactive substance abuse? and anxiety brought in by EMS [facility] for worsening R sided weakness and confusion. s/p Code Stroke in ED, NIHSS 11, evaluated by Neurology team, no evidence of acute CVA, admitted for further management of seizure disorder.    #Episode of Seizure in ED - per EMS  #s/p Code Stroke- Stable chronic infarct in the left basal ganglia- with Chronic R sided residual weakness   #Acute CVA ruled out   #Hx of TBI with Chronic R sided residual weakness   #Lactic acidosis likely from seizure episode  - NIHSS 11  - CT Angio Brain Stroke Protocol  w/ IV Cont - negative any acute finds  - Repeat CT head ordered 2/28  - at NH on Keppra 1 g BID  - check Keppra level, c/w Home dose for now  - neuro consult appreciated   - f/u REEG - inprogress  - c/w Atorvastatin 20 mg, check Lipid profile  - c/w IVF and trend Lactate    #Hx of schizophrenia  - c/w Home medication: Olanzapine 2.5 mg QHS  - Agitated in ED- c/w constant observation  - Consider Psychiatry consult if not improved     #HTN  #DLD  - c/w Home medication: Enalapril 5 mg daily and Lipitor 20 mg  - check K and hold ACEI if elevated    #IDDM  - Home: Lantus 35 U and Lispro 5 U QAC and SS  - Check A1C  - Started on Insulin 30/5/5/5 & SS  - Monitor FS and start Insulin SS, BB accordingly  - Avoid Hypoglycemia  - Target FS (140-180)    #Tachycardia likely from agitation from possible urinary retention   #Fluid overload  - bnp ordered  - Sharma was placed but unknown how much urinary retention  - TOV happening 2/28    DVT Ppx: Lovenox 40mg SQ QD  GI Ppx: N/A  Diet: DASH/CC  Activity: bedrest  Dispo: From NH- psych unit  Pending: repeat head ct, eeg, bnp, bmp, lactate, TOV

## 2023-02-28 NOTE — PATIENT PROFILE ADULT - CAREGIVER PHONE NUMBER
[facility]    November 9, 2021    Pain Management Procedure Note    CHIEF COMPLAINT:  Chief Complaint   Patient presents with   • Procedure       PROCEDURE:  Lumbar Epidural Steroid Injection, under Fluoroscopic Guidance    INDICATIONS:  The patient is a 32 year old female with a history of progressive axial low back pain with intermittent radiation into bilateral lower extremities and symptoms suspicious for intermittent neurogenic claudication.  This has been refractory to conservative treatment.  She presents for interventional reduction in pain, improved functionality and to reduce reliance on analgesics.    DIAGNOSIS:  1. Lumbar radiculopathy    2. Spinal stenosis, lumbar region, with neurogenic claudication    3. Chronic bilateral low back pain with bilateral sciatica        SURGEON:  Bijan Kimble MD    ASSISTANT:  None    DIAGNOSTICS:  Patient with advanced for her age degenerative changes throughout the lumbar spine.  Disc space collapse at L5-S1 with associated Modic changes.  Facet arthropathy contributing to moderate bilateral foraminal stenosis.  Most significant findings at L3-4 and L4-L5.  At L3-4 there is spondylitic changes and eccentric left disc protrusion along with posterior element hypertrophy contributing to moderate central narrowing.  L4-L5 similar diffuse disc bulge and posterior element hypertrophy contribute to moderate central stenosis.     MRI Lumbar Spine wo contrast:  Results for orders placed during the hospital encounter of 08/30/21     MRI LUMBAR SPINE WO CONTRAST     Impression  1. Multilevel degenerative disc disease with disc bulges causing moderate  central canal narrowing at L2-L3 and L3-L4. There is a left central disc  protrusion at L3-L4.  2. Multilevel facet joint OA. There is moderate neural foramina narrowing  at L5-S1, mild at other levels.    COMPLICATIONS:  None    Estimated Blood Loss:  Minimal    DESCRIPTION OF PROCEDURE:  After proper  informed signed voluntary consent was obtained, the patient was transferred to the procedure room. Patient was positioned prone with 1 pillow under her abdomen. Patient was prepped and draped in usual sterile fashion. Fluoroscopy was utilized to maximize landmarks for an interlaminar approach at the L3-L4 level. Subcutaneous and superficial tissues were infiltrated with 1% Lidocaine through a 27-gauge needle for local anesthesia. A 6 in 20-gauge Tuohy needle was then inserted percutaneously and advanced using intermittent fluoroscopy and loss of resistance technique.  Needle was directed towards the left lateral recess given predominant left-sided symptoms.  The epidural space was identified on the first pass. After negative aspiration, a small amount of contrast was injected, which revealed the appropriate epidurogram. There was excellent spread of contrast. After needle confirmation, 40 mg of Kenalog diluted with 0.2% Ropivacaine for a total volume of 3 mL was injected without incident. Needle was then removed.    The patient tolerated the procedure well. Following the procedure, the patient was ambulating comfortably without any numbness or weakness. Discharge instructions provided. There were no observed complications. Patient was discharged in satisfactory condition.    RECOMMENDATIONS:  Orders Placed This Encounter   • iohexol (OMNIPAQUE 240) contrast solution   • ropivacaine (NAROPIN) 2 MG/ML injection   • triamcinolone acetonide (KENALOG-40) 40 MG/ML injection     Return in about 1 month (around 12/9/2021) for lumbar epidural steroid injection L3-4.  · The patient is encouraged to continue home stretching and exercise regimen as previously outlined in therapy and clinic.  · Oswestry Disability Index  assessment was performed, patient scoring 52% describing severe disability.    Thank you, GRETCHEN Burns for allowing me to participate in the care of this patient. Please contact me with any  questions.    Bijan Kimble MD  [facility]     [phone]

## 2023-02-28 NOTE — PATIENT PROFILE ADULT - FALL HARM RISK - HARM RISK INTERVENTIONS
Assistance with ambulation/Assistance OOB with selected safe patient handling equipment/Communicate Risk of Fall with Harm to all staff/Discuss with provider need for PT consult/Monitor for mental status changes/Monitor gait and stability/Move patient closer to nurses' station/Provide patient with walking aids - walker, cane, crutches/Reinforce activity limits and safety measures with patient and family/Reorient to person, place and time as needed/Tailored Fall Risk Interventions/Toileting schedule using arm’s reach rule for commode and bathroom/Use of alarms - bed, chair and/or voice tab/Visual Cue: Yellow wristband and red socks/Bed in lowest position, wheels locked, appropriate side rails in place/Call bell, personal items and telephone in reach/Instruct patient to call for assistance before getting out of bed or chair/Non-slip footwear when patient is out of bed/[place] to call system/Physically safe environment - no spills, clutter or unnecessary equipment/Purposeful Proactive Rounding/Room/bathroom lighting operational, light cord in reach

## 2023-02-28 NOTE — STROKE CODE NOTE - NSSTROKEABCD2TIACLINICAL_GEN_A_CORE
Detail Level: Zone Hide Additional Notes?: No Include Location In Plan?: Yes Detail Level: Generalized Other symptoms

## 2023-02-28 NOTE — PATIENT PROFILE ADULT - FUNCTIONAL ASSESSMENT - BASIC MOBILITY 6.
2-calculated by average/Not able to assess (calculate score using [facility] averaging method)

## 2023-02-28 NOTE — ED ADULT NURSE REASSESSMENT NOTE - NS ED NURSE REASSESS COMMENT FT1
patient sleeping. v/s stable. no acute distress. iv patent. safety maintained. waiting for transport to in-patient bed assignment

## 2023-03-01 ENCOUNTER — TRANSCRIPTION ENCOUNTER (OUTPATIENT)
Age: 63
End: 2023-03-01

## 2023-03-01 VITALS — HEIGHT: 65 IN

## 2023-03-01 LAB
ALBUMIN SERPL ELPH-MCNC: 3.8 G/DL — SIGNIFICANT CHANGE UP (ref 3.5–5.2)
ALP SERPL-CCNC: 55 U/L — SIGNIFICANT CHANGE UP (ref 30–115)
ALT FLD-CCNC: 51 U/L — HIGH (ref 0–41)
ANION GAP SERPL CALC-SCNC: 11 MMOL/L — SIGNIFICANT CHANGE UP (ref 7–14)
AST SERPL-CCNC: 29 U/L — SIGNIFICANT CHANGE UP (ref 0–41)
BILIRUB SERPL-MCNC: 0.5 MG/DL — SIGNIFICANT CHANGE UP (ref 0.2–1.2)
BLD GP AB SCN SERPL QL: SIGNIFICANT CHANGE UP
BUN SERPL-MCNC: 13 MG/DL — SIGNIFICANT CHANGE UP (ref 10–20)
CALCIUM SERPL-MCNC: 8.6 MG/DL — SIGNIFICANT CHANGE UP (ref 8.4–10.5)
CHLORIDE SERPL-SCNC: 105 MMOL/L — SIGNIFICANT CHANGE UP (ref 98–110)
CO2 SERPL-SCNC: 24 MMOL/L — SIGNIFICANT CHANGE UP (ref 17–32)
CREAT SERPL-MCNC: 0.7 MG/DL — SIGNIFICANT CHANGE UP (ref 0.7–1.5)
EGFR: 105 ML/MIN/1.73M2 — SIGNIFICANT CHANGE UP
GLUCOSE BLDC GLUCOMTR-MCNC: 225 MG/DL — HIGH (ref 70–99)
GLUCOSE BLDC GLUCOMTR-MCNC: 268 MG/DL — HIGH (ref 70–99)
GLUCOSE BLDC GLUCOMTR-MCNC: 268 MG/DL — HIGH (ref 70–99)
GLUCOSE SERPL-MCNC: 223 MG/DL — HIGH (ref 70–99)
HCT VFR BLD CALC: 39.6 % — LOW (ref 42–52)
HGB BLD-MCNC: 13.2 G/DL — LOW (ref 14–18)
MAGNESIUM SERPL-MCNC: 1.9 MG/DL — SIGNIFICANT CHANGE UP (ref 1.8–2.4)
MCHC RBC-ENTMCNC: 29.3 PG — SIGNIFICANT CHANGE UP (ref 27–31)
MCHC RBC-ENTMCNC: 33.3 G/DL — SIGNIFICANT CHANGE UP (ref 32–37)
MCV RBC AUTO: 87.8 FL — SIGNIFICANT CHANGE UP (ref 80–94)
NRBC # BLD: 0 /100 WBCS — SIGNIFICANT CHANGE UP (ref 0–0)
PLATELET # BLD AUTO: 174 K/UL — SIGNIFICANT CHANGE UP (ref 130–400)
POTASSIUM SERPL-MCNC: 3.9 MMOL/L — SIGNIFICANT CHANGE UP (ref 3.5–5)
POTASSIUM SERPL-SCNC: 3.9 MMOL/L — SIGNIFICANT CHANGE UP (ref 3.5–5)
PROT SERPL-MCNC: 6.5 G/DL — SIGNIFICANT CHANGE UP (ref 6–8)
RBC # BLD: 4.51 M/UL — LOW (ref 4.7–6.1)
RBC # FLD: 12.4 % — SIGNIFICANT CHANGE UP (ref 11.5–14.5)
SODIUM SERPL-SCNC: 140 MMOL/L — SIGNIFICANT CHANGE UP (ref 135–146)
T PALLIDUM AB TITR SER: POSITIVE
WBC # BLD: 6.67 K/UL — SIGNIFICANT CHANGE UP (ref 4.8–10.8)
WBC # FLD AUTO: 6.67 K/UL — SIGNIFICANT CHANGE UP (ref 4.8–10.8)

## 2023-03-01 PROCEDURE — 99232 SBSQ HOSP IP/OBS MODERATE 35: CPT

## 2023-03-01 PROCEDURE — 95816 EEG AWAKE AND DROWSY: CPT | Mod: 26

## 2023-03-01 PROCEDURE — 99239 HOSP IP/OBS DSCHRG MGMT >30: CPT

## 2023-03-01 RX ORDER — LEVETIRACETAM 250 MG/1
1000 TABLET, FILM COATED ORAL ONCE
Refills: 0 | Status: DISCONTINUED | OUTPATIENT
Start: 2023-03-01 | End: 2023-03-01

## 2023-03-01 RX ORDER — INSULIN LISPRO 100/ML
8 VIAL (ML) SUBCUTANEOUS
Refills: 0 | Status: DISCONTINUED | OUTPATIENT
Start: 2023-03-01 | End: 2023-03-01

## 2023-03-01 RX ORDER — INSULIN GLARGINE 100 [IU]/ML
34 INJECTION, SOLUTION SUBCUTANEOUS AT BEDTIME
Refills: 0 | Status: DISCONTINUED | OUTPATIENT
Start: 2023-03-01 | End: 2023-03-01

## 2023-03-01 RX ADMIN — Medication 5 MILLIGRAM(S): at 05:28

## 2023-03-01 RX ADMIN — Medication 6: at 17:17

## 2023-03-01 RX ADMIN — Medication 4: at 07:48

## 2023-03-01 RX ADMIN — Medication 1 MILLIGRAM(S): at 11:49

## 2023-03-01 RX ADMIN — Medication 6: at 11:47

## 2023-03-01 RX ADMIN — ENOXAPARIN SODIUM 40 MILLIGRAM(S): 100 INJECTION SUBCUTANEOUS at 11:48

## 2023-03-01 RX ADMIN — Medication 5 UNIT(S): at 07:48

## 2023-03-01 RX ADMIN — Medication 100 MILLIGRAM(S): at 11:49

## 2023-03-01 RX ADMIN — LEVETIRACETAM 400 MILLIGRAM(S): 250 TABLET, FILM COATED ORAL at 05:28

## 2023-03-01 RX ADMIN — Medication 8 UNIT(S): at 11:48

## 2023-03-01 RX ADMIN — Medication 1 TABLET(S): at 11:49

## 2023-03-01 RX ADMIN — Medication 8 UNIT(S): at 17:17

## 2023-03-01 NOTE — DISCHARGE NOTE NURSING/CASE MANAGEMENT/SOCIAL WORK - NSDCPEFALRISK_GEN_ALL_CORE
For information on Fall & Injury Prevention, visit: [email or web address] OR  [email or web address] OR  https://www.cdc.gov/steadi/patient.html

## 2023-03-01 NOTE — PROGRESS NOTE ADULT - ASSESSMENT
63 yo M with PMH of HTN, DLD, IDDM, seizure disorder, schizophrenia, TBI with residual R sided weakness, psychoactive substance abuse? and anxiety brought in by EMS [facility] for worsening R sided weakness and confusion. s/p Code Stroke in ED, NIHSS 11, evaluated by Neurology team, no evidence of acute CVA, admitted for further management of seizure disorder.    #Episode of Seizure in ED - per EMS  #s/p Code Stroke- Stable chronic infarct in the left basal ganglia- with Chronic R sided residual weakness   #Acute CVA ruled out   #Hx of TBI with Chronic R sided residual weakness   #Lactic acidosis likely from seizure episode  - CT Angio Brain Stroke Protocol  w/ IV Cont - negative any acute finds  - Repeat CT head stable  - at NH on Keppra 1 g BID  - check Keppra level, c/w Home dose for now  - neuro consult appreciated   - f/u REEG - no seizure activity  - increase Atorvastatin 40 mg,   - Lactate back to nl post IVFs    #Hx of schizophrenia  - c/w Home medication: Olanzapine 2.5 mg QHS  - pleasant    #HTN  #DLD  - c/w Home medication: Enalapril 5 mg daily and Lipitor 20 mg    #IDDM  - Home: Lantus 35 U and Lispro 5 U QAC and SS  - A1C 6.6  -  Insulin 30/5/5/5 & SS  - Monitor FS and start Insulin SS, BB accordingly  - Avoid Hypoglycemia  - Target FS (140-180)    #Tachycardia likely from agitation from possible urinary retention   #Fluid overload  - bnp ordered  - Sharma was placed but unknown how much urinary retention  - passed TOV    Dispo: d/w neuro, stable for discharge back to [facility] home    My note supersedes the residents note should a discrepancy arise.    Chart and notes personally reviewed.  Care Discussed with Consultants/Other Providers/ Housestaff [ x] YES [ ] NO   Radiology, labs, old records personally reviewed.    discussed w/ housestaff, nursing, case management, neuro team

## 2023-03-01 NOTE — DISCHARGE NOTE PROVIDER - PROVIDER TOKENS
PROVIDER:[TOKEN:[[id number]],FOLLOWUP:[1 month]],FREE:[LAST:[PMD],PHONE:[(   )    -],FAX:[(   )    -],FOLLOWUP:[1 week]]

## 2023-03-01 NOTE — DISCHARGE NOTE PROVIDER - NSDCCPCAREPLAN_GEN_ALL_CORE_FT
PRINCIPAL DISCHARGE DIAGNOSIS  Diagnosis: Seizure  Assessment and Plan of Treatment: A seizure is a sudden abnormal electrical signal in the brain. A partial seizure can involve part of the brain, and a generalized seizure usually involves all areas of the brain. It can cause strange sensations and behavior, muscle spasms, and a change in or loss of consciousness. During your stay, healthcare staff witnessed  seizure and you were given an IV dose of your home seizure medication.   Call emergency medical services or 911 if you have new or worsening:  Seizure that lasts more than 5 minutes or you have one seizure after another  Trouble waking up after a seizure has stopped  Severe headache  Trouble breathing  Seizure after a head injury  Slurred speech  Loss of bladder or bowel control       PRINCIPAL DISCHARGE DIAGNOSIS  Diagnosis: Seizure  Assessment and Plan of Treatment: A seizure is a sudden abnormal electrical signal in the brain. A partial seizure can involve part of the brain, and a generalized seizure usually involves all areas of the brain. It can cause strange sensations and behavior, muscle spasms, and a change in or loss of consciousness. During your stay, healthcare staff witnessed  seizure and you were given an IV dose of your home seizure medication.   Call emergency medical services or 911 if you have new or worsening:  Seizure that lasts more than 5 minutes or you have one seizure after another  Trouble waking up after a seizure has stopped  Severe headache  Trouble breathing  Seizure after a head injury  Slurred speech  Loss of bladder or bowel control  Cont Keppra. Please follow up with neurology within 1month.

## 2023-03-01 NOTE — DISCHARGE NOTE NURSING/CASE MANAGEMENT/SOCIAL WORK - NSDCVIVACCINE_GEN_ALL_CORE_FT
Tdap; 06-Jul-2020 06:10; Matilde Spann (LEXIE); [employer]; [id number] (Exp. Date: 04-Apr-2022); IntraMuscular; Deltoid Left.; 0.5 milliLiter(s); VIS (VIS Published: 09-May-2013, VIS Presented: 06-Jul-2020);

## 2023-03-01 NOTE — ED ADULT TRIAGE NOTE - NS ED NURSE BANDS TYPE
Needs CMP FLP hemoglobin A1c microalbumin and TSH.  Are you able to send these to [facility]?   Name band;

## 2023-03-01 NOTE — DISCHARGE NOTE PROVIDER - NSDCMRMEDTOKEN_GEN_ALL_CORE_FT
atorvastatin 20 mg oral tablet: 1 tab(s) orally once a day  enalapril 5 mg oral tablet: 1 tab(s) orally once a day  folic acid 1 mg oral tablet: 1 tab(s) orally once a day  insulin glargine: 35 unit(s) subcutaneous once a day (at bedtime)  insulin lispro (concentrated) 200 units/mL subcutaneous solution: 5 unit(s) subcutaneous 3 times a day (before meals)  levETIRAcetam 1000 mg oral tablet: 1 tab(s) orally 2 times a day  Multiple Vitamins with Minerals oral tablet: 1 tab(s) orally once a day  OLANZapine 2.5 mg oral tablet: 1 tab(s) orally once a day (at bedtime)  senna oral tablet: 2 tab(s) orally once a day (at bedtime)  thiamine 100 mg oral tablet: 1 tab(s) orally once a day   atorvastatin 20 mg oral tablet: 1 tab(s) orally once a day  folic acid 1 mg oral tablet: 1 tab(s) orally once a day  insulin glargine: 35 unit(s) subcutaneous once a day (at bedtime)  insulin lispro (concentrated) 200 units/mL subcutaneous solution: 5 unit(s) subcutaneous 3 times a day (before meals)  levETIRAcetam 1000 mg oral tablet: 1 tab(s) orally 2 times a day  Multiple Vitamins with Minerals oral tablet: 1 tab(s) orally once a day  OLANZapine 2.5 mg oral tablet: 1 tab(s) orally once a day (at bedtime)  senna oral tablet: 2 tab(s) orally once a day (at bedtime)  thiamine 100 mg oral tablet: 1 tab(s) orally once a day

## 2023-03-01 NOTE — PROGRESS NOTE ADULT - ASSESSMENT
This is a 63 yo M with PMHx of TBI (AAOX2-3 at baseline), anxiety, epilepsy on keppra presenting for R sided weakness that started at 7 am. Per EMS, patient had seizure upon arrival which self resolved. Stroke code activated and neurology consulted. Spoke with nurse manager [phone] states patient at base line AOX 1-2 walks with supervision drags right leg and feeds him self with right hand LKW 9:15 am found to have right sided weakness. Collateral from EMT team witnessing pt unresponsive, shaking lasting 30 seconds, eyes gaze to the right, did not witness bite tongue, and is incontinent. As per nurse patient is compliant with his seizure medications and has not had a seizure for a while, denies patient having recent illness. In ED,bp 155/90, , RR 24, SpO2 96% on 2L? Labs significant for: K 5.2 (H), Glu 323, Lac 4.7. s/p 1 g Keppra IV and I L IVF bolus in ED. Upon presentation patient presented with right sided weakness, slightly aphasic and agitated, and does not want to speak, stating to leave him alone. On exam: no gaze preference patient incontinent, no observed tongue laceration, no obvious asterxis, pupils normal, no facial droop. CTH shows acute pathology. Stable mild chronic microvascular changes. CTA no LVO, stable scattered atheromatous changes with up to about 50% stenosis of the proximal left ICA and moderate stenosis of bilateral carotid siphons. Patient symptoms indicate patient most probably had tonic clonic seizure vs TIA.     Plan:  - CT Head reviewed, showed no acute abnormalities  - EEG reviewed, showed mild to moderate left hemispheric focal slowing  - S/p Keppra load  - F/u AED levels  - Continue AED medications  - Keep Mg > 2, K > 4, replete PRN  - Seizure & fall precautions  - Okay to d/c from Neurology perspective  - Can follow up with Neurology outpatient  - Continue rest of care as per primary team   This is a 61 yo M with PMHx of TBI (AAOX2-3 at baseline), anxiety, epilepsy on keppra presenting for R sided weakness that started at 7 am. Per EMS, patient had seizure upon arrival which self resolved. Stroke code activated and neurology consulted. Spoke with nurse manager [phone] states patient at base line AOX 1-2 walks with supervision drags right leg and feeds him self with right hand LKW 9:15 am found to have right sided weakness. Collateral from EMT team witnessing pt unresponsive, shaking lasting 30 seconds, eyes gaze to the right, did not witness bite tongue, and is incontinent. As per nurse patient is compliant with his seizure medications and has not had a seizure for a while, denies patient having recent illness. In ED,bp 155/90, , RR 24, SpO2 96% on 2L? Labs significant for: K 5.2 (H), Glu 323, Lac 4.7. s/p 1 g Keppra IV and I L IVF bolus in ED. Upon presentation patient presented with right sided weakness, slightly aphasic and agitated, and does not want to speak, stating to leave him alone. On exam: no gaze preference patient incontinent, no observed tongue laceration, no obvious asterxis, pupils normal, no facial droop. CTH shows acute pathology. Stable mild chronic microvascular changes. CTA no LVO, stable scattered atheromatous changes with up to about 50% stenosis of the proximal left ICA and moderate stenosis of bilateral carotid siphons. Patient symptoms indicate patient most probably had tonic clonic seizure vs TIA.     Plan:  - CT Head reviewed, showed no acute abnormalities  - EEG reviewed, showed mild to moderate left hemispheric focal slowing  - S/p Keppra load  - F/u AED levels  - Continue AED medications  - Keep Mg > 2, K > 4, replete PRN  - Seizure & fall precautions  - Can give 2 mg IV Ativan for seizures > 2 mins  - Okay to d/c from Neurology perspective  - Can follow up with Neurology outpatient  - Continue rest of care as per primary team

## 2023-03-01 NOTE — DISCHARGE NOTE NURSING/CASE MANAGEMENT/SOCIAL WORK - CAREGIVER ADDRESS
[id number], [facility] [id number]

## 2023-03-01 NOTE — DISCHARGE NOTE PROVIDER - CARE PROVIDERS DIRECT ADDRESSES
[email or web address] of [email or web address]

## 2023-03-01 NOTE — PROGRESS NOTE ADULT - SUBJECTIVE AND OBJECTIVE BOX
Neurology Progress Note    Interval History:    Patient seen and examined at bedside. There were no acute events overnight. Patient states he feels well, denied any complaints.      PAST MEDICAL & SURGICAL HISTORY:  Seizure  Brain aneurysm  Anxiety  TBI (traumatic brain injury)  No significant past surgical history      Medications:  atorvastatin 20 milliGRAM(s) Oral at bedtime  dextrose 5%. 1000 milliLiter(s) IV Continuous <Continuous>  dextrose 5%. 1000 milliLiter(s) IV Continuous <Continuous>  dextrose 50% Injectable 25 Gram(s) IV Push once  dextrose 50% Injectable 12.5 Gram(s) IV Push once  dextrose 50% Injectable 25 Gram(s) IV Push once  dextrose Oral Gel 15 Gram(s) Oral once PRN  enalapril 5 milliGRAM(s) Oral daily  enoxaparin Injectable 40 milliGRAM(s) SubCutaneous every 24 hours  folic acid 1 milliGRAM(s) Oral daily  glucagon  Injectable 1 milliGRAM(s) IntraMuscular once  insulin glargine Injectable (LANTUS) 34 Unit(s) SubCutaneous at bedtime  insulin lispro (ADMELOG) corrective regimen sliding scale   SubCutaneous three times a day before meals  insulin lispro Injectable (ADMELOG) 8 Unit(s) SubCutaneous three times a day before meals  levETIRAcetam 1000 milliGRAM(s) Oral once  levETIRAcetam  IVPB 1000 milliGRAM(s) IV Intermittent every 12 hours  multivitamin/minerals 1 Tablet(s) Oral daily  OLANZapine 2.5 milliGRAM(s) Oral at bedtime  senna 2 Tablet(s) Oral at bedtime  thiamine 100 milliGRAM(s) Oral daily      Vital Signs Last 24 Hrs  T(C): 36.6 (01 Mar 2023 13:17), Max: 36.6 (01 Mar 2023 13:17)  T(F): 97.8 (01 Mar 2023 13:17), Max: 97.8 (01 Mar 2023 13:17)  HR: 107 (01 Mar 2023 13:17) (80 - 107)  BP: 158/83 (01 Mar 2023 13:17) (123/68 - 158/83)  RR: 17 (01 Mar 2023 13:17) (17 - 18)  SpO2: 99% (01 Mar 2023 05:12) (99% - 99%)    Parameters below as of 01 Mar 2023 05:12  Patient On (Oxygen Delivery Method): room air      Neurologic Examination:  General: Appearance is consistent with chronologic age. No abnormal facies. Gross skin survey within normal limits  Mental Status: The patient is oriented to person does not know age refusing to talk, +slurred speech, moderate aphasia  Cranial Nerves: Unable to examine patient non compliant did say he sees my hand, no obvious nystagmus, PERRLA. No ptosis/weakness of eyelid closure. Facial sensation normal V1 - 3, no facial asymmetry. Hearing grossly intact B/L  Motor examination: Right arm 0-1/5, Left arm 4/5, Right leg 0/5, Left leg 4/5 , no observable drift. Normal tone, bulk and range of motion. No tenderness, twitching, tremors or involuntary movements  Sensory examination: Intact to light touch in all extremities  Reflexes: 2+ B/L pectoralis, patella and Achilles. Clonus absent, negative Babinski sign  Coordination: Unable to assess  Gait: Deferred      Labs:  CBC Full  -  ( 01 Mar 2023 07:18 )  WBC Count : 6.67 K/uL  RBC Count : 4.51 M/uL  Hemoglobin : 13.2 g/dL  Hematocrit : 39.6 %  Platelet Count - Automated : 174 K/uL  Mean Cell Volume : 87.8 fL  Mean Cell Hemoglobin : 29.3 pg  Mean Cell Hemoglobin Concentration : 33.3 g/dL      03-01    140  |  105  |  13  ----------------------------<  223<H>  3.9   |  24  |  0.7    Ca    8.6      01 Mar 2023 07:18  Mg     1.9     03-01    TPro  6.5  /  Alb  3.8  /  TBili  0.5  /  DBili  x   /  AST  29  /  ALT  51<H>  /  AlkPhos  55  03-01    LIVER FUNCTIONS - ( 01 Mar 2023 07:18 )  Alb: 3.8 g/dL / Pro: 6.5 g/dL / ALK PHOS: 55 U/L / ALT: 51 U/L / AST: 29 U/L / GGT: x             < from: CT Head No Cont (02.28.23 @ 16:34) >  PROCEDURE DATE:  02/28/2023          INTERPRETATION:  CLINICAL INDICATION: Follow-up stroke code. Right-sided   weakness.    Technique: CT of the head was performed without contrast.    Multiple contiguous axial images were acquired from the skullbase to the   vertex without the administration of intravenous contrast.  Coronal and   sagittal reformations were made.    COMPARISON:  prior head CT dated 2/27/2023    FINDINGS:    The ventricles and sulci are unremarkable in appearance.    There is periventricular white matter hypodensity. There is a chronic   left basal ganglia infarct as well as chronic bilateral cerebellar   infarcts. There is no intraparenchymal hematoma, mass effect or midline   shift. No abnormal extra-axial fluid collections are present.    The calvarium is intact. The visualized intraorbital compartments,   paranasal sinuses and mastoid complexes appear free of acute disease.   Soft tissue fills the bilateral external auditory canals likely   representing cerumen.    IMPRESSION:  No acute intracranial pathology. No evidence of midline shift, mass   effect or intracranial hemorrhage.    Moderate chronic microvascular type changes as well as chronic infarcts   as above.    < from: EEG (02.28.23 @ 13:30) >  PROCEDURE DATE:  02/28/2023          INTERPRETATION:  Exam Performed on: 16 Channel Digital Routine EEG done   on [employer] recording system.    History  Seizures  TBI, ANXIETY, BRAIN ANEURYSM, SEIZURE, CONVULSIONS    Medications    Medication  Date  KEPPRA  2023/02/28  LOVENOX  2023/02/28  DEXTROSE  2023/02/28  LIPITOR  2023/02/28  ZYPREXA  2023/02/28  FOLIC ACID  2023/02/28    State  Awake    Symmetry  Symmetric    Organization  Well organized    PDR  Continuous  Background: 7-8 hz    Generalized Slowing  No    Focal Slowing  Yes  Left  mild-moderate  hemisphere      Breach Artifact:  No    Activation Procedure  Hyper Ventilation  No    Photic Stimulation  No    Epileptiform Activity  No    Events:  No    Impression:  Abnormal due to the presence of: focal slowing as above    Clinical Correlation & Recommendations  Consistent with focal electrophysiological dysfunction.  Secondary to   nonspecific cause.    
Patient is a 62y old  Male who presents with a chief complaint of seizure, r/o CVA (27 Feb 2023 16:33)      Patient seen and examined at bedside.  Patient denies any chest pain or shortness of breath  ALLERGIES:  No Known Allergies    MEDICATIONS:  atorvastatin 20 milliGRAM(s) Oral at bedtime  dextrose 5%. 1000 milliLiter(s) IV Continuous <Continuous>  dextrose 5%. 1000 milliLiter(s) IV Continuous <Continuous>  dextrose 50% Injectable 25 Gram(s) IV Push once  dextrose 50% Injectable 12.5 Gram(s) IV Push once  dextrose 50% Injectable 25 Gram(s) IV Push once  dextrose Oral Gel 15 Gram(s) Oral once PRN  enalapril 5 milliGRAM(s) Oral daily  enoxaparin Injectable 40 milliGRAM(s) SubCutaneous every 24 hours  folic acid 1 milliGRAM(s) Oral daily  glucagon  Injectable 1 milliGRAM(s) IntraMuscular once  insulin glargine Injectable (LANTUS) 30 Unit(s) SubCutaneous at bedtime  insulin lispro (ADMELOG) corrective regimen sliding scale   SubCutaneous three times a day before meals  insulin lispro Injectable (ADMELOG) 5 Unit(s) SubCutaneous three times a day before meals  lactated ringers. 1000 milliLiter(s) IV Continuous <Continuous>  levETIRAcetam  IVPB 1000 milliGRAM(s) IV Intermittent every 12 hours  multivitamin/minerals 1 Tablet(s) Oral daily  OLANZapine 2.5 milliGRAM(s) Oral at bedtime  senna 2 Tablet(s) Oral at bedtime  thiamine 100 milliGRAM(s) Oral daily    Vital Signs Last 24 Hrs  T(F): 96 (28 Feb 2023 08:00), Max: 99.1 (27 Feb 2023 15:14)  HR: 78 (28 Feb 2023 08:00) (78 - 124)  BP: 117/71 (28 Feb 2023 08:00) (117/71 - 139/81)  RR: 18 (28 Feb 2023 08:00) (18 - 22)  SpO2: 96% (28 Feb 2023 08:00) (95% - 100%)  I&O's Summary    28 Feb 2023 07:01  -  28 Feb 2023 13:48  --------------------------------------------------------  IN: 100 mL / OUT: 350 mL / NET: -250 mL        PHYSICAL EXAM:  General: NAD, A/O x 2, impulsive   ENT: MMM  Neck: Supple, No JVD  Lungs: diminished bilaterally, basal crackles   Cardio: RRR, S1/S2, No murmurs  Abdomen: Soft, Nontender, Nondistended; Bowel sounds present  Extremities: No cyanosis, No edema    LABS:                        14.2   8.82  )-----------( 208      ( 27 Feb 2023 10:35 )             42.0     02-27    144  |  102  |  13  ----------------------------<  244  5.1   |  23  |  0.7    Ca    10.0      27 Feb 2023 16:58    TPro  7.8  /  Alb  4.5  /  TBili  0.3  /  DBili  x   /  AST  44  /  ALT  69  /  AlkPhos  66  02-27      PT/INR - ( 27 Feb 2023 10:35 )   PT: 11.40 sec;   INR: 1.00 ratio         PTT - ( 27 Feb 2023 10:35 )  PTT:23.5 sec  Lactate, Blood: 2.4 mmol/L (02-27 @ 16:58)    CARDIAC MARKERS ( 27 Feb 2023 20:18 )  x     / x     / 130 U/L / x     / x          02-27 Chol 204 mg/dL LDL -- HDL 50 mg/dL Trig 64 mg/dL    10:24 - VBG - pH: 7.37  | pCO2: 42    | pO2: 38    | Lactate: 4.70             POCT Blood Glucose.: 334 mg/dL (28 Feb 2023 10:34)  POCT Blood Glucose.: 180 mg/dL (28 Feb 2023 08:00)  POCT Blood Glucose.: 112 mg/dL (27 Feb 2023 23:07)  POCT Blood Glucose.: 305 mg/dL (27 Feb 2023 19:09)          COVID-19 PCR: NotDetec (02-27-23 @ 12:20)      RADIOLOGY & ADDITIONAL TESTS:    Care Discussed with Consultants/Other Providers: 
Patient is a 62y old  Male who presents with a chief complaint of seizure, r/o CVA (27 Feb 2023 16:33)    HPI:  61 yo M with PMH of HTN, DLD, IDDM, seizure disorder, schizophrenia, TBI with residual R sided weakness, psychoactive substance abuse? and anxiety [facility] R sided weakness and confusion. During my encounter pt is severely agitated and not amendable to talk or answer any questions, using abusive words. History obtained from NH facility. History goes back to the morning of admission where pt was found to be confused, they reported as "AMS" and with more sever RUE and RLE weakness. At BL pt is AAOx1-2 and communicates with yes/no and able to ambulates on his own as per NH staff and brother. Per EMS, patient had seizure upon arrival which self resolved. No reported fever, chills, abdominal discomfort, cough or urinary symptoms.  In ED, s/p Code Stroke, NIHSS 11, evaluated by Neurology team and had imaging done reported below:    In ED,  VT: bp 155/90, , RR 24, SpO2 96% on 2L?  Labs significant for: K 5.2 (H), Glu 323, Lac 4.7    CT Angio Brain Stroke Protocol  w/ IV Cont   1.  No evidence of acute large vessel occlusion. Normal perfusion images.  2.  Stable scattered atheromatous changes with up to about 50% stenosis of the proximal left ICA and moderate stenosis of bilateral carotid siphons.      CT Brain Stroke Protocol   1.  No evidence of acute intracranial hemorrhage or large territory infarct.  2.  Stable chronic infarct in the left basal ganglia. Stable mild chronic microvascular changes.    Xray Chest 1 View AP/PA Mild congestive changes centrally.    s/p 1 g Keppra IV and I L IVF bolus in ED             (27 Feb 2023 15:00)    Patient seen and examined at bedside.  Patient denies any pain or shortness of breath  ALLERGIES:  No Known Allergies    PHYSICAL EXAM:  General: NAD, AAO x 1+  ENT: MMM  Neck: Supple, No JVD  Lungs: diminished bilaterally, basal crackles   Cardio: RRR, S1/S2, No murmurs  Abdomen: Soft, Nontender, Nondistended; Bowel sounds present  Extremities: No cyanosis, No edema  Neuro Rt 4/5, Lt 5/5          Home Medications:  atorvastatin 20 mg oral tablet: 1 tab(s) orally once a day (27 Feb 2023 15:28)  folic acid 1 mg oral tablet: 1 tab(s) orally once a day (27 Feb 2023 15:28)  insulin glargine: 35 unit(s) subcutaneous once a day (at bedtime) (27 Feb 2023 15:28)  insulin lispro (concentrated) 200 units/mL subcutaneous solution: 5 unit(s) subcutaneous 3 times a day (before meals) (27 Feb 2023 15:28)  levETIRAcetam 1000 mg oral tablet: 1 tab(s) orally 2 times a day (27 Feb 2023 15:28)  Multiple Vitamins with Minerals oral tablet: 1 tab(s) orally once a day (27 Feb 2023 15:28)  OLANZapine 2.5 mg oral tablet: 1 tab(s) orally once a day (at bedtime) (27 Feb 2023 15:28)  senna oral tablet: 2 tab(s) orally once a day (at bedtime) (27 Feb 2023 15:28)  thiamine 100 mg oral tablet: 1 tab(s) orally once a day (27 Feb 2023 15:28)    MEDICATIONS  (STANDING):  atorvastatin 20 milliGRAM(s) Oral at bedtime  dextrose 5%. 1000 milliLiter(s) (100 mL/Hr) IV Continuous <Continuous>  dextrose 5%. 1000 milliLiter(s) (50 mL/Hr) IV Continuous <Continuous>  dextrose 50% Injectable 25 Gram(s) IV Push once  dextrose 50% Injectable 12.5 Gram(s) IV Push once  dextrose 50% Injectable 25 Gram(s) IV Push once  enalapril 5 milliGRAM(s) Oral daily  enoxaparin Injectable 40 milliGRAM(s) SubCutaneous every 24 hours  folic acid 1 milliGRAM(s) Oral daily  glucagon  Injectable 1 milliGRAM(s) IntraMuscular once  insulin glargine Injectable (LANTUS) 34 Unit(s) SubCutaneous at bedtime  insulin lispro (ADMELOG) corrective regimen sliding scale   SubCutaneous three times a day before meals  insulin lispro Injectable (ADMELOG) 8 Unit(s) SubCutaneous three times a day before meals  levETIRAcetam  IVPB 1000 milliGRAM(s) IV Intermittent every 12 hours  multivitamin/minerals 1 Tablet(s) Oral daily  OLANZapine 2.5 milliGRAM(s) Oral at bedtime  senna 2 Tablet(s) Oral at bedtime  thiamine 100 milliGRAM(s) Oral daily    MEDICATIONS  (PRN):  dextrose Oral Gel 15 Gram(s) Oral once PRN Blood Glucose LESS THAN 70 milliGRAM(s)/deciliter    Vital Signs Last 24 Hrs  T(C): 36.6 (01 Mar 2023 13:17), Max: 36.6 (01 Mar 2023 13:17)  T(F): 97.8 (01 Mar 2023 13:17), Max: 97.8 (01 Mar 2023 13:17)  HR: 107 (01 Mar 2023 13:17) (80 - 107)  BP: 158/83 (01 Mar 2023 13:17) (123/68 - 158/83)  BP(mean): --  RR: 17 (01 Mar 2023 13:17) (17 - 18)  SpO2: 99% (01 Mar 2023 05:12) (99% - 99%)    Parameters below as of 01 Mar 2023 05:12  Patient On (Oxygen Delivery Method): room air      CAPILLARY BLOOD GLUCOSE      POCT Blood Glucose.: 268 mg/dL (01 Mar 2023 11:42)  POCT Blood Glucose.: 225 mg/dL (01 Mar 2023 07:27)  POCT Blood Glucose.: 278 mg/dL (28 Feb 2023 21:21)  POCT Blood Glucose.: 237 mg/dL (28 Feb 2023 16:36)    LABS:                        13.2   6.67  )-----------( 174      ( 01 Mar 2023 07:18 )             39.6     03-01    140  |  105  |  13  ----------------------------<  223<H>  3.9   |  24  |  0.7    Ca    8.6      01 Mar 2023 07:18  Mg     1.9     03-01    TPro  6.5  /  Alb  3.8  /  TBili  0.5  /  DBili  x   /  AST  29  /  ALT  51<H>  /  AlkPhos  55  03-01    LIVER FUNCTIONS - ( 01 Mar 2023 07:18 )  Alb: 3.8 g/dL / Pro: 6.5 g/dL / ALK PHOS: 55 U/L / ALT: 51 U/L / AST: 29 U/L / GGT: x           CARDIAC MARKERS ( 27 Feb 2023 20:18 )  x     / x     / 130 U/L / x     / x                      Consultant Notes Reviewed:  [x ] YES  [ ] NO  Care Discussed with Consultants/Other Providers/ Housestaff [ x] YES  [ ] NO  Radiology, labs, new studies personally reviewed.

## 2023-03-01 NOTE — DISCHARGE NOTE PROVIDER - CARE PROVIDER_API CALL
Harpreet Marquis)  Neurology  [address] [place] [address], [address]  [place] [address]  Phone: [phone]  Fax: [phone]  Follow Up Time: 1 month    PMD,   Phone: (   )    -  Fax: (   )    -  Follow Up Time: 1 week

## 2023-03-01 NOTE — DISCHARGE NOTE PROVIDER - HOSPITAL COURSE
61 yo M with PMH of HTN, DLD, IDDM, seizure disorder, schizophrenia, TBI with residual R sided weakness, psychoactive substance abuse? and anxiety [facility] for worsening R sided weakness and confusion. During my encounter pt is severely agitated and not amendable to talk or answer any questions, using abusive words. History obtained from NH facility. History goes back to the morning of admission where pt was found to be confused, they reported as "AMS" and with more sever RUE and RLE weakness. At BL pt is AAOx1-2 and communicates with yes/no and able to ambulates on his own as per NH staff and brother. Per EMS, patient had seizure upon arrival which self resolved. No reported fever, chills, abdominal discomfort, cough or urinary symptoms.  In ED, s/p Code Stroke, NIHSS 11, evaluated by Neurology team and had imaging done reported below:    In ED,  VT: bp 155/90, , RR 24, SpO2 96% on 2L?  Labs significant for: K 5.2 (H), Glu 323, Lac 4.7    CT Angio Brain Stroke Protocol  w/ IV Cont   1.  No evidence of acute large vessel occlusion. Normal perfusion images.  2.  Stable scattered atheromatous changes with up to about 50% stenosis of the proximal left ICA and moderate stenosis of bilateral carotid siphons.      CT Brain Stroke Protocol   1.  No evidence of acute intracranial hemorrhage or large territory infarct.  2.  Stable chronic infarct in the left basal ganglia. Stable mild chronic microvascular changes.    Xray Chest 1 View AP/PA Mild congestive changes centrally.    s/p 1 g Keppra IV and I L IVF bolus in ED    #Episode of Seizure in ED - per EMS  #s/p Code Stroke- Stable chronic infarct in the left basal ganglia- with Chronic R sided residual weakness   #Acute CVA ruled out   #Hx of TBI with Chronic R sided residual weakness   #Lactic acidosis likely from seizure episode - resolved  - NIHSS 11  - CT Angio Brain Stroke Protocol  w/ IV Cont - negative any acute finds  - Repeat CT head 2/28 - no acute intracranial pathology  - at NH on Keppra 1 g BID  - check Keppra level, c/w Home dose for now  - neuro consult appreciated   - REEG - general slowing  - c/w Atorvastatin 20 mg,       #Hx of schizophrenia  - c/w Home medication: Olanzapine 2.5 mg QHS  - Agitated in ED      #HTN  #DLD  - c/w Home medication: Enalapril 5 mg daily and Lipitor 20 mg  - check K and hold ACEI if elevated    #IDDM  - Home: Lantus 35 U and Lispro 5 U QAC and SS  - Check A1C  - Started on Insulin 30/5/5/5 & SS  - Monitor FS and start Insulin SS, BB accordingly  - Avoid Hypoglycemia  - Target FS (140-180)    #Tachycardia likely from agitation from possible urinary retention   #Fluid overload  - bnp ordered  - Sharma was placed but unknown how much urinary retention  - TOV 2/28

## 2023-03-01 NOTE — DISCHARGE NOTE NURSING/CASE MANAGEMENT/SOCIAL WORK - PATIENT PORTAL LINK FT
You can access the FollowMyHealth Patient Portal offered by [facility] by registering at the following website: [email or web address]. By joining [employer]’s FollowMyHealth portal, you will also be able to view your health information using other applications (apps) compatible with our system.

## 2023-03-02 LAB — LEVETIRACETAM SERPL-MCNC: <2 UG/ML — LOW (ref 10–40)

## 2023-03-10 DIAGNOSIS — E87.20 ACIDOSIS, UNSPECIFIED: ICD-10-CM

## 2023-03-10 DIAGNOSIS — R47.01 APHASIA: ICD-10-CM

## 2023-03-10 DIAGNOSIS — F41.9 ANXIETY DISORDER, UNSPECIFIED: ICD-10-CM
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
## 2023-03-10 DIAGNOSIS — Z79.899 OTHER LONG TERM (CURRENT) DRUG THERAPY: ICD-10-CM

## 2023-03-10 DIAGNOSIS — Z20.822 CONTACT WITH AND (SUSPECTED) EXPOSURE TO COVID-19: ICD-10-CM

## 2023-03-10 DIAGNOSIS — R33.9 RETENTION OF URINE, UNSPECIFIED: ICD-10-CM

## 2023-03-10 DIAGNOSIS — F20.9 SCHIZOPHRENIA, UNSPECIFIED: ICD-10-CM

## 2023-03-10 DIAGNOSIS — E11.9 TYPE 2 DIABETES MELLITUS WITHOUT COMPLICATIONS: ICD-10-CM

## 2023-03-10 DIAGNOSIS — Z79.4 LONG TERM (CURRENT) USE OF INSULIN: ICD-10-CM

## 2023-03-10 DIAGNOSIS — I69.351 HEMIPLEGIA AND HEMIPARESIS FOLLOWING CEREBRAL INFARCTION AFFECTING RIGHT DOMINANT SIDE: ICD-10-CM

## 2023-03-10 DIAGNOSIS — G45.9 TRANSIENT CEREBRAL ISCHEMIC ATTACK, UNSPECIFIED: ICD-10-CM

## 2023-03-10 DIAGNOSIS — I10 ESSENTIAL (PRIMARY) HYPERTENSION: ICD-10-CM

## 2023-03-10 DIAGNOSIS — R00.0 TACHYCARDIA, UNSPECIFIED: ICD-10-CM

## 2023-03-10 DIAGNOSIS — E87.70 FLUID OVERLOAD, UNSPECIFIED: ICD-10-CM

## 2023-03-10 DIAGNOSIS — S06.9XAS UNSPECIFIED INTRACRANIAL INJURY WITH LOSS OF CONSCIOUSNESS STATUS UNKNOWN, SEQUELA: ICD-10-CM

## 2023-03-10 DIAGNOSIS — G40.409 OTHER GENERALIZED EPILEPSY AND EPILEPTIC SYNDROMES, NOT INTRACTABLE, WITHOUT STATUS EPILEPTICUS: ICD-10-CM

## 2023-05-04 ENCOUNTER — APPOINTMENT (OUTPATIENT)
Dept: ENDOCRINOLOGY | Facility: CLINIC | Age: 63
End: 2023-05-04

## 2023-05-04 ENCOUNTER — OUTPATIENT (OUTPATIENT)
Dept: OUTPATIENT SERVICES | Facility: HOSPITAL | Age: 63
LOS: 1 days | End: 2023-05-04
Payer: MEDICARE

## 2023-05-04 VITALS
HEART RATE: 7 BPM | SYSTOLIC BLOOD PRESSURE: 123 MMHG | BODY MASS INDEX: 32.96 KG/M2 | WEIGHT: 192 LBS | DIASTOLIC BLOOD PRESSURE: 82 MMHG

## 2023-05-04 DIAGNOSIS — Z86.39 PERSONAL HISTORY OF OTHER ENDOCRINE, NUTRITIONAL AND METABOLIC DISEASE: ICD-10-CM

## 2023-05-04 DIAGNOSIS — Z87.898 PERSONAL HISTORY OF OTHER SPECIFIED CONDITIONS: ICD-10-CM

## 2023-05-04 DIAGNOSIS — Z00.00 ENCOUNTER FOR GENERAL ADULT MEDICAL EXAMINATION WITHOUT ABNORMAL FINDINGS: ICD-10-CM

## 2023-05-04 PROCEDURE — 82043 UR ALBUMIN QUANTITATIVE: CPT

## 2023-05-04 PROCEDURE — 36415 COLL VENOUS BLD VENIPUNCTURE: CPT

## 2023-05-04 PROCEDURE — 86341 ISLET CELL ANTIBODY: CPT

## 2023-05-04 PROCEDURE — 80061 LIPID PANEL: CPT

## 2023-05-04 PROCEDURE — 99204 OFFICE O/P NEW MOD 45 MIN: CPT

## 2023-05-04 PROCEDURE — 84681 ASSAY OF C-PEPTIDE: CPT

## 2023-05-04 NOTE — REASON FOR VISIT
[Initial Evaluation] : an initial evaluation [DM Type 1] : DM Type 1 [Other: _____] : [unfilled] [Weight Management/Obesity] : weight management/obesity

## 2023-05-04 NOTE — PHYSICAL EXAM
[No Acute Distress] : no acute distress [Well Developed] : well developed [No Proptosis] : no proptosis [No Lid Lag] : no lid lag [Thyroid Not Enlarged] : the thyroid was not enlarged [No Respiratory Distress] : no respiratory distress [No Accessory Muscle Use] : no accessory muscle use [Clear to Auscultation] : lungs were clear to auscultation bilaterally [Normal S1, S2] : normal S1 and S2 [No Murmurs] : no murmurs [Regular Rhythm] : with a regular rhythm [No Edema] : no peripheral edema [Not Tender] : non-tender [Soft] : abdomen soft [Acanthosis Nigricans] : acanthosis nigricans present [Oriented x3] : oriented to person, place, and time [Abdominal Striae] : no abdominal striae

## 2023-05-04 NOTE — ASSESSMENT
[FreeTextEntry1] : 63 year old patient who present for follow up of diabetes:\par \par #type 1 DM (per facility chart , asked for more record)\par - 4/2021 hba1c 6.6% on basaglar 35 units and Lispro 5 tidac > A1C 9.6 4/2023\par - SMBG uncontrolled mostly above 180-200\par - f/u with opthalmology and podiatry \par - continue statin \par \par Plan:\par Stop Insulin Glargine\par Start tresiba U-200 40 Units daily\par Novolog Flex Pen before meals SS\par Check C-Peptide, BAO-65 Lipid profile \par unclear what type of diabetes he has, in any case may tryglp-1 agonist next time, try sliding scale novolog before meals

## 2023-05-04 NOTE — HISTORY OF PRESENT ILLNESS
[FreeTextEntry1] : 63 year old male who present for initial evaluation of diabetes, per facility chart patient has type 1 DM ? and is on on basaglar 35 units at daily  and lispro 5 units before meals, pt last visit 2021, today is here with NH staff. \par \par smbg : reviewed with high FS mostly post meals and even Fasting >200\par Unclear if patient podiatry and opthalmology at the facility \par No documented blood work to support Type 1> will order today\par \par

## 2023-05-05 LAB
C PEPTIDE SERPL-MCNC: 4.8 NG/ML
CHOLEST SERPL-MCNC: 155 MG/DL
CREAT SPEC-SCNC: 100 MG/DL
HDLC SERPL-MCNC: 39 MG/DL
LDLC SERPL CALC-MCNC: 93 MG/DL
MICROALBUMIN 24H UR DL<=1MG/L-MCNC: 1.2 MG/DL
MICROALBUMIN/CREAT 24H UR-RTO: 12 MG/G
NONHDLC SERPL-MCNC: 116 MG/DL
TRIGL SERPL-MCNC: 116 MG/DL

## 2023-05-08 LAB — GAD65 AB SER-MCNC: 0 NMOL/L

## 2023-05-10 ENCOUNTER — APPOINTMENT (OUTPATIENT)
Dept: NEUROLOGY | Facility: CLINIC | Age: 63
End: 2023-05-10
Payer: MEDICARE

## 2023-05-10 VITALS
HEART RATE: 94 BPM | OXYGEN SATURATION: 100 % | TEMPERATURE: 96.2 F | DIASTOLIC BLOOD PRESSURE: 70 MMHG | SYSTOLIC BLOOD PRESSURE: 129 MMHG | WEIGHT: 195 LBS | HEIGHT: 64 IN | BODY MASS INDEX: 33.29 KG/M2

## 2023-05-10 PROCEDURE — 99213 OFFICE O/P EST LOW 20 MIN: CPT

## 2023-05-10 RX ORDER — PEG-3350, SODIUM SULFATE, SODIUM CHLORIDE, POTASSIUM CHLORIDE, SODIUM ASCORBATE AND ASCORBIC ACID 7.5-2.691G
100 KIT ORAL
Qty: 1 | Refills: 0 | Status: DISCONTINUED | COMMUNITY
Start: 2021-02-26 | End: 2023-05-10

## 2023-05-11 RX ORDER — SEMAGLUTIDE 1.34 MG/ML
4 INJECTION, SOLUTION SUBCUTANEOUS
Qty: 1 | Refills: 5 | Status: ACTIVE | COMMUNITY
Start: 2023-05-05 | End: 1900-01-01

## 2023-05-12 DIAGNOSIS — E66.9 OBESITY, UNSPECIFIED: ICD-10-CM

## 2023-05-12 DIAGNOSIS — E10.65 TYPE 1 DIABETES MELLITUS WITH HYPERGLYCEMIA: ICD-10-CM

## 2023-05-24 NOTE — H&P ADULT - REASON FOR ADMISSION
ENDOCRINE FOLLOW-UP    Chief Complaint   Patient presents with   • Adrenal Problem     Follow up visit-Last appt. 5/18/2022     HISTORY OF PRESENT ILLNESS:  Ming Lawson is here for endocrinology follow-up for 8 mm left adrenal adenoma, prior unintentional weight loss.  Ming does not have any questions or concerns his sister Harriet wants him to stop levothyroxine and says that his new primary physician Dr. Agustina Lozano was agreeable to this.  Ming resides in the group home and all his medications are bubble packed.  His sister and mother are concerned about elevated ferritin level and were told that this will be addressed here.  He does have anemia and ferritin level was ordered because of anemia.  Ming is accompanied by his mother Elinor and his sister Harriet who are his guardians since he had traumatic brain injury in 2010.    In the past he has had low glucose after eating he did fasting insulin level recently which was noted to be in the normal range.  REVIEW OF SYSTEMS:  Constitutional Review gain of 3 kg since 05/18/2022 endocrine office visit noted on chart review.  Ming does not have any acute Cardiovascular/Respiratory/Urinary/GI complaints  Skin no complaints.  Neurological history of traumatic brain injury part of frontal lobe was removed surgically.  He is treated with seizure medication.  Psychological he has a history of bipolar disorder he is on medications for anxiety and depression.  Hematological anemia with high ferritin level noted on lab recently  Outpatient Medications Marked as Taking for the 5/24/23 encounter (Office Visit) with Thomas Syed MD   Medication Sig Dispense Refill   • levETIRAcetam (KepPRA) 500 MG tablet Take 1 tablet by mouth daily. 60 tablet 0   • Cholecalciferol 125 mcg (5,000 units) capsule TAKE ONE CAPSULE BY MOUTH EVERY DAY 30 capsule 11   • potassium CHLORIDE (KLOR-CON M) 20 MEQ mandeep ER tablet Take 1 tablet by mouth daily. 90 tablet 0   • cetirizine (ZyrTEC) 10 MG  seizure, r/o CVA tablet Take 1 tablet by mouth daily as needed for Allergies or Rhinitis. 90 tablet 3   • docusate sodium (COLACE) 100 MG capsule Take 1 capsule by mouth every other day. 15 capsule 11   • ferrous sulfate 324 (65 Fe) MG EC tablet Take 1 tablet by mouth daily (with breakfast). 30 tablet 11   • oxybutynin (DITROPAN-XL) 10 MG 24 hr tablet Take 1 tablet by mouth daily. 90 tablet 3   • tamsulosin (FLOMAX) 0.4 MG Cap Take 1 capsule by mouth daily after a meal. 90 capsule 3   • busPIRone (BUSPAR) 15 MG tablet Take one tablet of 15mg PO three times per day for anxiety. 90 tablet 3   • divalproex (DEPAKOTE ER) 250 MG 24 hr ER tablet TAKE ONE TABLET BY MOUTH TWICE A DAY FOR PSYCHOSIS 180 tablet 1   • sertraline (ZOLOFT) 50 MG tablet TAKE ONE TABLET BY MOUTH EVERY MORNING (DEPRESSION) 90 tablet 1   • vitamin B-12 (CYANOCOBALAMIN) 1000 MCG tablet TAKE ONE TABLET BY MOUTH EVERY MORNING (SUPPLEMENT) 90 tablet 1   • QUEtiapine (SEROquel XR) 150 MG 24 hr tablet TAKE ONE TABLET BY MOUTH AT BEDTIME 90 tablet 1   • Ascorbic Acid (vitamin C) 500 MG tablet TAKE ONE TABLET BY MOUTH TWICE A DAY (SUPPLEMENT) 60 tablet 11   • Multiple Vitamins-Minerals (CertaVite/Antioxidants) Tab TAKE ONE TABLET BY MOUTH EVERY MORNING (SUPPLEMENT) 30 tablet 11   • melatonin 3 MG TAKE ONE TABLET BY MOUTH AT BEDTIME 90 tablet 3   • Wound Dressings ([facility] Wound/Burn Dressing) Gel Apply 1 each topically daily. Apply 1/2 inch thick layer to left foot ulcers-cover with gauze/kerlix. Change once daily 1 each 3   • Acetaminophen Extra Strength 500 MG tablet TAKE TWO TABLETS BY MOUTH THREE TIMES DAILY AS NEEDED FOR PAIN 30 tablet 11   • QUEtiapine (SEROquel XR) 50 MG 24 hr tablet Take 1 tablet by mouth every morning. Ok to repeat one time daily prn 60 tablet 11      ALLERGIES:   Allergen Reactions   • Tramadol        Past Medical History:   Diagnosis Date   • Adenomatous polyp of ascending colon 12/2021    Tubular adenoma   • Arterial ischemic stroke,  vertebrobasilar, brainstem, remote, resolved    • Arthritis    • Bipolar 1 disorder (CMD)    • Blindness of right eye    • Brain trauma (CMD) 2010   • Constipation    • Gastritis    • Hydrocephalus (CMD)    • Hyperlipidemia    • Hypothyroidism    • Lower back pain    • Seizure (CMD)      Past Surgical History:   Procedure Laterality Date   • Brain surgery     • Craniectomy/craniotomy, decompressive, w/wo duraplasty Bilateral 06/10/2010   • Lumbar drain implantation     • Mastoid surgery Right    • Occult blood test tube  2011     Family History   Problem Relation Age of Onset   • Osteoarthritis Mother    • Depression Mother    • Hearing Loss Mother    • High cholesterol Mother    • Kidney disease Mother    • Cancer, Breast Mother    • Multiple myeloma Mother    • Kidney disease Father    • Osteoarthritis Father    • Depression Father    • High blood pressure Father    • High cholesterol Father    • Cancer, Liver Father    • Gastrointestinal Maternal Grandmother         ISCHEMIC COLON   • Cancer Maternal Grandfather         PANCREATIC   • Heart Paternal Grandmother    • Heart Paternal Grandfather         CHF   • Thyroid Neg Hx      Social History     Tobacco Use   • Smoking status: Former     Current packs/day: 0.00     Average packs/day: 0.5 packs/day for 20.0 years (10.0 pk-yrs)     Types: Cigarettes     Start date: 1990     Quit date: 2010     Years since quittin.8   • Smokeless tobacco: Never   Vaping Use   • Vaping Use: never used   Substance Use Topics   • Alcohol use: No     Alcohol/week: 0.0 standard drinks of alcohol   • Drug use: No       PHYSICAL EXAMINATION:  Visit Vitals  /72 (BP Location: LUE - Left upper extremity, Patient Position: Sitting, Cuff Size: Regular)   Ht 5' 10\" (1.778 m)   Wt 62 kg (136 lb 9.6 oz)   BMI 19.60 kg/m²     Body mass index is 19.6 kg/m².  Constitutional:  Thin built, appears  nourished, no acute distress, non-toxic appearance.   Eyes:  Pupils  equal, eye movements, conjunctivae normal.  HENT:  Anterior forehead surgical scar extending into the scalp , external ears normal, nose and mouth normal.  Neck- Normal range of motion, no tenderness, supple. Thyroid gland is not enlarged, no distinct nodules are palpable.  Respiratory:  No respiratory distress, normal breath sounds, no rales, no wheezing.   Cardiovascular:  Normal rate, normal rhythm, no murmurs, no gallops, no rubs.   Musculoskeletal:  No edema, no tenderness, no deformities.  Back- No tenderness.  Integument:  Well hydrated, no rash.  Normal body hair distribution.  Lymphatic:  No palpable anterior cervical lymphadenopathy noted.   Neurologic:  Alert able to answer some questions cognitive defect noted, normal motor function.  Psychiatric:  Speech and behavior appropriate.     LABS  04/21/2023 fasting insulin level 6 micro international units/mL.  Glucose 71 mg/dL.   Component Ref Range & Units 1 yr ago  (5/18/22) 1 yr ago  (1/18/22)   Metanephrine 0.00 - 0.49 nmol/L <0.10  <0.10    Normetanephrine 0.00 - 0.89 nmol/L 0.52  0.90 High         02/22/2020 Cortisol saliva 1.026  Cortisol saliva 0.5 midnight.  01/18/2022 plasma metanephrine <1.0  Normetanephrine level 0.90  TSH (mcUnits/mL)   Date Value   04/11/2023 0.376     T4, Free (ng/dL)   Date Value   04/11/2023 0.9   04/11/2023 ferritin level 537 normal range  ng/mL.   Hemoglobin A1C (%)   Date Value   07/23/2014 4.8   06/14/2014 4.9   07/18/2012 5.0   11/04/2011 5.3     Sodium (mmol/L)   Date Value   04/21/2023 141     Potassium (mmol/L)   Date Value   04/21/2023 3.6     Chloride (mmol/L)   Date Value   04/21/2023 103     Glucose (mg/dL)   Date Value   04/21/2023 71     Calcium (mg/dL)   Date Value   04/21/2023 9.4     Carbon Dioxide (mmol/L)   Date Value   04/21/2023 33 (H)     BUN (mg/dL)   Date Value   04/21/2023 19     Creatinine (mg/dL)   Date Value   04/21/2023 0.86     GOT/AST (Units/L)   Date Value   04/21/2023 24     GPT/ALT  (Units/L)   Date Value   04/21/2023 26     Alkaline Phosphatase (Units/L)   Date Value   04/21/2023 60     Bilirubin, Total (mg/dL)   Date Value   04/21/2023 0.2     Lab Services on 05/23/2023   Component Date Value Ref Range Status   • Cholesterol 05/23/2023 206 (H)  <=199 mg/dL Final      Desirable         <200  Borderline High   200 to 239  High              >=240   • Triglycerides 05/23/2023 90  <=149 mg/dL Final      Normal            <150  Borderline High   150 to 199  High              200 to 499  Very High         >=500   • HDL 05/23/2023 70  >=40 mg/dL Final      Low              <40  Borderline Low   40 to 49  Near Optimal     50 to 59  Optimal          >=60   • LDL 05/23/2023 118  <=129 mg/dL Final      Optimal           <100  Near Optimal      100 to 129  Borderline High   130 to 159  High              160 to 189  Very High         >=190   • Non-HDL Cholesterol 05/23/2023 136  mg/dL Final      Therapeutic Target:  CHD and risk equivalents  <130  Multiple risk factors     <160  0 to 1 risk factor        <190   • Cholesterol/ HDL Ratio 05/23/2023 2.9  <=4.4 Final   • Iron 05/23/2023 29 (L)  65 - 175 mcg/dL Final   • Iron Binding Capacity 05/23/2023 283  250 - 450 mcg/dL Final   • Iron, Percent Saturation 05/23/2023 10 (L)  15 - 45 % Final     WBC (K/mcL)   Date Value   04/11/2023 4.2     RBC (mil/mcL)   Date Value   04/11/2023 3.69 (L)     HCT (%)   Date Value   04/11/2023 34.7 (L)     HGB (g/dL)   Date Value   04/11/2023 11.8 (L)     PLT (K/mcL)   Date Value   04/11/2023 177     IMAGING  09/27/2022   CT ADRENAL GLANDS WO CONTRAST     HISTORY: Follow-up adrenal nodule.     COMPARISON: CT chest abdomen pelvis with contrast 3/15/2022 and CT adrenal  glands 1/17/2022.     TECHNIQUE:  Axial CT images of the abdomen without contrast.     FINDINGS:       ADRENAL GLANDS:     Left: Unchanged 8 mm left adrenal nodule. This was previously characterized  as a benign adenoma on prior adrenal protocol CT from  1/17/2022.     Right: Unremarkable.     LOWER CHEST:     Lung bases:  Unremarkable.     Heart:  Unremarkable.      ABDOMEN:     Liver:  Unremarkable.      Biliary system:  Unremarkable.     Spleen:  Unremarkable.     Pancreas:  Unremarkable.     Kidneys:  Nonobstructing 4 mm stone within the right kidney.     Bowel: Unremarkable.     Retroperitoneum/mesentery:  Unremarkable.     Vascular:  Mild aortoiliac vascular calcifications.     Osseous structures/subcutaneous tissues:  No suspicious lesion.  Degenerative changes of the spine.        IMPRESSION:  Unchanged 8 mm left adrenal adenoma.  Nonobstructing 4 mm right renal stone.  3/15/2022  EXAM: CT CHEST ABDOMEN PELVIS W CONTRAST   DATE/TIME: 3/15/2022 11:54 AM.   CLINICAL INDICATION: 57-year-old male presenting with weight loss.   COMPARISON: CT chest abdomen pelvis with contrast 10/22/2021. CT adrenal  glands 1/17/2022.  TECHNIQUE: Axial CT images of the chest, abdomen and pelvis were obtained  after the uneventful administration of 100 cc Omnipaque 300 IV and 25 cc  dilute oral Omnipaque 350. 3D reconstructed and 2D multiplanar reformations  (MPR) in coronal and sagittal planes were also sent to PACS with the  primary data set.  FINDINGS:  CHEST:  No thoracic aortic aneurysm. Mild thoracic aortic calcification. The main  pulmonary artery is not enlarged.   No thoracic lymphadenopathy. No pneumothorax. No pleural effusion or  pericardial effusion. The heart is not enlarged. Mild coronary artery  calcification. Moderate bilateral gynecomastia.  Subtle patchy centrilobular groundglass opacities are seen within the right  upper lobe. No pulmonary nodules or masses. The trachea and mainstem  bronchi are patent.  ABDOMEN AND PELVIS:  The liver, spleen, pancreas and right adrenal gland are unremarkable. An 8  mm nodule arising from the left adrenal gland is unchanged. This was  previously characterized as an adenoma. The gallbladder is present. Tiny  hypoattenuating foci  scattered throughout both kidneys are unchanged. There  are too small to adequately characterize but most likely represent cysts. A  3 mm nonobstructive calculus is seen within the lower pole the right kidney  (7/74). No evidence of collecting system obstruction.  The bowel is normal in caliber and wall thickness. No evidence of bowel  obstruction. No free air. No lymphadenopathy. The appendix is normal.  The prostate is enlarged. The urinary bladder is distended. No definite  evidence of urinary bladder mass on today's exam. Trace pelvic free fluid  within the rectovesical space is of uncertain etiology.  Mild abdominal aortic calcification.  MUSCULOSKELETAL/SOFT TISSUES:  No acute osseous findings. No expansile or destructive osseous lesions.  Mild multilevel lumbosacral spondylosis.  The soft tissues are unremarkable.  IMPRESSION:  1.  No evidence of malignancy within the chest, abdomen or pelvis.   2.  Subtle patchy centrilobular groundglass opacities within the right  upper lobe are nonspecific and favored to be infectious or inflammatory.  3.  Unchanged 8 mm left adrenal adenoma.  4.  Nonobstructing 3 mm right lower pole renal calculus.  5.  Urinary bladder distention, possibly secondary to prostate enlargement.  6.  Trace pelvic free fluid, nonspecific.    3/16/2022  MRI BRAIN W WO CONTRAST  CLINICAL INDICATION:  57 years-old Male with presenting history of  headache--brain injury.  History of status post bilateral craniotomies,  evacuation intraparenchymal hemorrhages and right PCA territory infarct as  detailed on outside prior head CTs of 2010 in [facility] care everywhere.  COMPARISON:  Multiple priors, most recently brain MRI 5/17/2016.  TECHNIQUE:  Using a 1.5 Sharron imaging system, multiplanar multisequence MRI  of the brain is performed without and with contrast.  CONTRAST:  6 cc of Gadavist intravenously.  FINDINGS:   Redemonstrated prior bilateral frontoparietal craniotomies.  Persistent  extensive cystic  encephalomalacia involving the anteroinferior frontal  lobes, greater on the left and anterior temporal lobes, greater on the  right.  Persistent chronic hemosiderin staining along the bilateral frontal  lobes and subtly along the right cerebellar folia, likely relating to  sequelae of prior hemorrhage.  Redemonstrated moderate chronic right PCA  territory infarct involving the the cuneus and mesial right  occipitotemporal lobe ]with gyriform probable cortical petechial  hemorrhage.  Persistent moderate ventriculomegaly with compensatory  dilatation of the right occipital, bilateral frontal and right temporal  horns respectively.  Persistent linear encephalomalacia along the left  frontal lobe directed towards left frontal horn, likely corresponding to a  prior ventricular shunt catheter.  Persistent chronic thinning of the  anterior corpus callosum and focal encephalomalacia along the splenial  corpus callosum.  Persistent focal encephalomalacia along the mesial left  occipitotemporal lobe parahippocampal gyrus, possibly posttraumatic or  related to prior infarct.  No diffusion restriction to suggest acute ischemia.  Unremarkable intracranial T2 vascular flow voids.  Left ocular lens replacement.   Mucous retention cyst/polyp along the inferior right maxillary sinus  Redemonstrated underlying partial right mastoidectomy changes with  persistent opacification of the mastoid bowl and adjacent mastoid air  cells.  Unchanged 1.6 cm T2 hypointense partially enhancing and calcified  extra-axial lesion along the right orbital roof/subfrontal region with  persistent regional mass effect, again likely relating to meningioma in  similar dating back to at least 2/13/2014.  No additional pathologic  intracranial enhancement elsewhere.  IMPRESSION:      1.  No acute intracranial abnormality.       2.  Unchanged 1.6 cm probable meningioma along the right orbital  roof/subfrontal region, similar dating back to at least 2/13/2014.        3.  Redemonstrated bilateral craniotomies, extensive frontotemporal  encephalomalacia and chronic blood products, likely posttraumatic.     4.  Redemonstrated moderate chronic right PCA territory infarct with  cortical petechial hemorrhage.     5.  Additional unchanged chronic findings as above.       ASSESSMENT   1. Adrenal adenoma, left    2. Acquired hypothyroidism    3. Anemia, normocytic normochromic        DISCUSSION:  Family is concerned about elevated ferritin level iron level iron binding capacity and saturation of iron could be helpful in assessing whether he has iron overload ferritin can be an acute phase reactant.  His sister wants him to stop levothyroxine I explained to them that thyroid labs have to be repeated 4 months after stopping levothyroxine he may not need to take thyroid hormone TSH free T4 and total T4 will be measured.      PLAN:  Since levothyroxine 50 mcg daily is going to be discontinued to assess whether he will need to be on the medication you have to do another blood test in 3 months so if you stop the levothyroxine 50 mcg daily then repeat thyroid blood test end of August beginning of 23 you will be notified of results if the TSH test goes high then you will need to be on thyroid hormone.    Left adrenal nodule is small and it is noted to be stable no intervention is needed.    Adrenal CT can be repeated in 1-2 years.    Recommend that you follow-up for endocrinology follow-up earlier if needed but you can also see me back in 1 year.

## 2023-06-05 NOTE — PHYSICAL THERAPY INITIAL EVALUATION ADULT - PHYSICAL ASSIST/NONPHYSICAL ASSIST: SIT/STAND, REHAB EVAL
[facility]   Physical Therapy progress Note     Name: Tony Gordillo  Clinic Number: [id number]    Therapy Diagnosis:   Encounter Diagnoses   Name Primary?    S/P right rotator cuff repair Yes    Biceps tendonosis of right shoulder     Decreased range of motion of right shoulder     Impaired strength of shoulder muscles          Physician: Crissy Bradford MD    Visit Date: 6/5/2023    Physician Orders: PT Eval and Treat   Medical Diagnosis from Referral: S46.011A (ICD-10-CM) - Traumatic rotator cuff tear, right, initial encounter   Evaluation Date: 3/15/2023  Authorization Period Expiration: 3/12/2024  Plan of Care Expiration: 8/1/23  Visit # / Visits authorized: 11/12   Progress Note Due: 6/22/23  FOTO: 1/1  HEP: Access Code: [email or web address]     Precautions: Diabetes;      POSTOPERATIVE PLAN: Plan to follow subscapularis-supraspinatus repair protocol (<3 cm in total size). Passive motion may begin at 4 weeks. Active motion at 6 weeks. No biceps resistive activity x 8 weeks. No cuff resistive activity x 12 weeks.      DATE OF PROCEDURE: 3/14/2023       PREOPERATIVE DIAGNOSES:   Right     1. Rotator cuff tear   2. Biceps tendinopathy    POSTOPERATIVE DIAGNOSES:   Right   1. Rotator cuff tear, full thickness involving the subscapularis and supraspinatus,   2. Biceps tendinopathy     OPERATION:   Right Shoulder  1. Arthroscopic  subscapularis and supraspinatus rotator cuff repair   2. Arthroscopic biceps tenodesis  3. 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 He knows that sometimes he does  "a little too much, and he's trying to be more mindful of that.     He was compliant with home exercise program.  Response to previous treatment: good  Functional change: improved ADL's    Pain: 3/10  Location: right shoulder      OBJECTIVE       TREATMENT     Tony received the treatments listed below:      Bold = performed today     Tony received therapeutic exercises to develop strength, endurance, ROM, flexibility, posture, and core stabilization for 38 minutes including:    UBE 4 fwd, 4 bwd  Serratus press 3lbs 3 x 15  Sidelying Shoulder Flexion 2# 2x10  Bicep curl 3 x 10 RTB  Scapular wall slides with pillow case 2 x 10  Sidelying ER 2 lb  Prone shoulder ext 3 x 10 +6  Rows BTB 3x10  Standing full can scaption 0# 2x10  Wax on/off +yellow weighted ball on wall in scaption CKC  2 x 30  Chest pass vs rebounder yellow weighted ball 3 x 12 seated on blue t-ball      Tony received the following manual therapy techniques: Soft tissue Mobilization were applied to the: R shoulder complex for 00 minutes, including:  Manual GH PROM       neuromuscular re-education activities to improve: Balance, Coordination, Kinesthetic, Sense, Proprioception, and Posture for 17 minutes. The following activities were included:    IR walkouts RTB 3 x 15  ER walkouts YTB 3 x 15  Rhythmic stabilization supine at 90 deg 3x30"    Cold pack 10'    PATIENT EDUCATION AND HOME EXERCISES     Home Exercises Provided and Patient Education Provided     Education provided:   - HEP   - post op precautions     Written Home Exercises Provided: Patient instructed to cont prior HEP. Exercises were reviewed and Tony was able to demonstrate them prior to the end of the session.  Tony demonstrated fair  understanding of the education provided. See EMR under Patient Instructions for exercises provided during therapy sessions    ASSESSMENT   Tony presented to PT with reports of continued right shoulder soreness. Increased resistance to multiple therex for " improved RTC strength and stability.  Provided verbal and tactile cues for Tony to perform Sidelying ER with correct scapula positioning prior to beginning exercise. Tony reported occasional right shoulder fatigue, but was able to complete today's session without reports of increased right shoulder pain or discomfort. Will continue to progress right RTC stability and strength per post op RTC protocol.     Pt prognosis is Guarded.     Pt will continue to benefit from skilled outpatient physical therapy to address the deficits listed in the problem list box on initial evaluation, provide pt/family education and to maximize pt's level of independence in the home and community environment.     Pt's spiritual, cultural and educational needs considered and pt agreeable to plan of care and goals.     Anticipated Barriers for therapy: compliance with post-op precautions     GOALS: Short Term Goals: 8 weeks  updated 5/22/23  MET  1.Report decreased in pain at worse less than  <   / =  6  /10  to increase tolerance for functional mobility. MET  2. Pt to improve PROM of flexion, scaption and abduction to 90deg and IR/ER to 30deg and R elbow to 0-130deg to allow for improved functional mobility to allow for improvement in IADLs. Met 4/12/23  3. Increased BUE MMT 1/2 grade to increase tolerance for ADL and work activities.  MET  4. Pt to report be conscious of impaired sitting and standing posture daily to decrease pain. MET  5. Pt to tolerate HEP to improve ROM and independence with ADL's.  MET     Long Term Goals: 16 weeks   in progress  1.Report decreased in pain at worse less than  <   / =  4  /10  to increase tolerance for functional mobility. On going  2.  Patient will report clinically significant improvements on FOTO score.On going  3.Increased BUE MMT 1 grade to increase tolerance for ADL and work activities.On going  4. Pt to report and demonstrate proper posture in standing and sitting to decrease pain. On going  5.  Pt to be Independent with HEP to improve ROM and independence with ADL's.On going  6. Pt to improve AROM of flexion, scaption and abduction to 150deg and IR/ER to 60deg and R elbow to 0-150deg to allow for improved functional mobility to allow for improvement in IADLs. On going    PLAN     Progress shoulder stabilization activities next visit per RTC repair protocol.     Natalia Day, PTA  06/05/2023                                                       for hand placement/1 person assist/verbal cues

## 2023-06-06 ENCOUNTER — APPOINTMENT (OUTPATIENT)
Dept: NEUROLOGY | Facility: CLINIC | Age: 63
End: 2023-06-06
Payer: MEDICARE

## 2023-06-06 PROCEDURE — 95816 EEG AWAKE AND DROWSY: CPT

## 2023-07-25 ENCOUNTER — INPATIENT (INPATIENT)
Facility: HOSPITAL | Age: 63
LOS: 1 days | Discharge: NURSING, REHAB OR LONG-TERM CARE | DRG: 101 | End: 2023-07-27
Attending: HOSPITALIST | Admitting: PSYCHIATRY & NEUROLOGY
Payer: MEDICARE

## 2023-07-25 VITALS
HEIGHT: 65 IN | TEMPERATURE: 97 F | DIASTOLIC BLOOD PRESSURE: 63 MMHG | HEART RATE: 78 BPM | WEIGHT: 205.03 LBS | SYSTOLIC BLOOD PRESSURE: 131 MMHG

## 2023-07-25 DIAGNOSIS — G40.909 EPILEPSY, UNSPECIFIED, NOT INTRACTABLE, WITHOUT STATUS EPILEPTICUS: ICD-10-CM

## 2023-07-25 LAB
ANION GAP SERPL CALC-SCNC: 12 MMOL/L — SIGNIFICANT CHANGE UP (ref 7–14)
BUN SERPL-MCNC: 16 MG/DL — SIGNIFICANT CHANGE UP (ref 10–20)
CALCIUM SERPL-MCNC: 8.9 MG/DL — SIGNIFICANT CHANGE UP (ref 8.4–10.5)
CHLORIDE SERPL-SCNC: 102 MMOL/L — SIGNIFICANT CHANGE UP (ref 98–110)
CO2 SERPL-SCNC: 24 MMOL/L — SIGNIFICANT CHANGE UP (ref 17–32)
CREAT SERPL-MCNC: 1 MG/DL — SIGNIFICANT CHANGE UP (ref 0.7–1.5)
EGFR: 85 ML/MIN/1.73M2 — SIGNIFICANT CHANGE UP
GLUCOSE BLDC GLUCOMTR-MCNC: 237 MG/DL — HIGH (ref 70–99)
GLUCOSE BLDC GLUCOMTR-MCNC: 248 MG/DL — HIGH (ref 70–99)
GLUCOSE SERPL-MCNC: 266 MG/DL — HIGH (ref 70–99)
MAGNESIUM SERPL-MCNC: 1.7 MG/DL — LOW (ref 1.8–2.4)
POTASSIUM SERPL-MCNC: 4.2 MMOL/L — SIGNIFICANT CHANGE UP (ref 3.5–5)
POTASSIUM SERPL-SCNC: 4.2 MMOL/L — SIGNIFICANT CHANGE UP (ref 3.5–5)
SODIUM SERPL-SCNC: 138 MMOL/L — SIGNIFICANT CHANGE UP (ref 135–146)

## 2023-07-25 PROCEDURE — 99221 1ST HOSP IP/OBS SF/LOW 40: CPT

## 2023-07-25 PROCEDURE — 95716 VEEG EA 12-26HR CONT MNTR: CPT

## 2023-07-25 PROCEDURE — 84100 ASSAY OF PHOSPHORUS: CPT

## 2023-07-25 PROCEDURE — 80177 DRUG SCRN QUAN LEVETIRACETAM: CPT

## 2023-07-25 PROCEDURE — 80048 BASIC METABOLIC PNL TOTAL CA: CPT

## 2023-07-25 PROCEDURE — 95700 EEG CONT REC W/VID EEG TECH: CPT

## 2023-07-25 PROCEDURE — 85025 COMPLETE CBC W/AUTO DIFF WBC: CPT

## 2023-07-25 PROCEDURE — 83735 ASSAY OF MAGNESIUM: CPT

## 2023-07-25 PROCEDURE — 80053 COMPREHEN METABOLIC PANEL: CPT

## 2023-07-25 PROCEDURE — 82962 GLUCOSE BLOOD TEST: CPT

## 2023-07-25 PROCEDURE — 83036 HEMOGLOBIN GLYCOSYLATED A1C: CPT

## 2023-07-25 PROCEDURE — 85027 COMPLETE CBC AUTOMATED: CPT

## 2023-07-25 PROCEDURE — 36415 COLL VENOUS BLD VENIPUNCTURE: CPT

## 2023-07-25 RX ORDER — MULTIVIT-MIN/FERROUS GLUCONATE 9 MG/15 ML
1 LIQUID (ML) ORAL DAILY
Refills: 0 | Status: DISCONTINUED | OUTPATIENT
Start: 2023-07-25 | End: 2023-07-27

## 2023-07-25 RX ORDER — INSULIN GLARGINE 100 [IU]/ML
40 INJECTION, SOLUTION SUBCUTANEOUS EVERY MORNING
Refills: 0 | Status: DISCONTINUED | OUTPATIENT
Start: 2023-07-26 | End: 2023-07-27

## 2023-07-25 RX ORDER — SODIUM CHLORIDE 9 MG/ML
1000 INJECTION, SOLUTION INTRAVENOUS
Refills: 0 | Status: DISCONTINUED | OUTPATIENT
Start: 2023-07-25 | End: 2023-07-27

## 2023-07-25 RX ORDER — SENNA PLUS 8.6 MG/1
2 TABLET ORAL AT BEDTIME
Refills: 0 | Status: DISCONTINUED | OUTPATIENT
Start: 2023-07-25 | End: 2023-07-27

## 2023-07-25 RX ORDER — INSULIN HUMAN 100 [IU]/ML
5 INJECTION, SOLUTION SUBCUTANEOUS ONCE
Refills: 0 | Status: COMPLETED | OUTPATIENT
Start: 2023-07-25 | End: 2023-07-25

## 2023-07-25 RX ORDER — DEXTROSE 50 % IN WATER 50 %
12.5 SYRINGE (ML) INTRAVENOUS ONCE
Refills: 0 | Status: DISCONTINUED | OUTPATIENT
Start: 2023-07-25 | End: 2023-07-27

## 2023-07-25 RX ORDER — OLANZAPINE 15 MG/1
1 TABLET, FILM COATED ORAL
Qty: 0 | Refills: 0 | DISCHARGE

## 2023-07-25 RX ORDER — DEXTROSE 50 % IN WATER 50 %
15 SYRINGE (ML) INTRAVENOUS ONCE
Refills: 0 | Status: DISCONTINUED | OUTPATIENT
Start: 2023-07-25 | End: 2023-07-27

## 2023-07-25 RX ORDER — THIAMINE MONONITRATE (VIT B1) 100 MG
100 TABLET ORAL DAILY
Refills: 0 | Status: DISCONTINUED | OUTPATIENT
Start: 2023-07-25 | End: 2023-07-27

## 2023-07-25 RX ORDER — ACETAMINOPHEN 500 MG
650 TABLET ORAL EVERY 6 HOURS
Refills: 0 | Status: DISCONTINUED | OUTPATIENT
Start: 2023-07-25 | End: 2023-07-27

## 2023-07-25 RX ORDER — ATORVASTATIN CALCIUM 80 MG/1
1 TABLET, FILM COATED ORAL
Qty: 0 | Refills: 0 | DISCHARGE

## 2023-07-25 RX ORDER — INSULIN LISPRO 100/ML
VIAL (ML) SUBCUTANEOUS
Refills: 0 | Status: DISCONTINUED | OUTPATIENT
Start: 2023-07-25 | End: 2023-07-27

## 2023-07-25 RX ORDER — LEVETIRACETAM 250 MG/1
1000 TABLET, FILM COATED ORAL
Refills: 0 | Status: DISCONTINUED | OUTPATIENT
Start: 2023-07-25 | End: 2023-07-26

## 2023-07-25 RX ORDER — LANOLIN ALCOHOL/MO/W.PET/CERES
3 CREAM (GRAM) TOPICAL AT BEDTIME
Refills: 0 | Status: DISCONTINUED | OUTPATIENT
Start: 2023-07-25 | End: 2023-07-27

## 2023-07-25 RX ORDER — DEXTROSE 50 % IN WATER 50 %
25 SYRINGE (ML) INTRAVENOUS ONCE
Refills: 0 | Status: DISCONTINUED | OUTPATIENT
Start: 2023-07-25 | End: 2023-07-27

## 2023-07-25 RX ORDER — ONDANSETRON 8 MG/1
4 TABLET, FILM COATED ORAL EVERY 8 HOURS
Refills: 0 | Status: DISCONTINUED | OUTPATIENT
Start: 2023-07-25 | End: 2023-07-27

## 2023-07-25 RX ORDER — INSULIN DEGLUDEC 100 U/ML
40 INJECTION, SOLUTION SUBCUTANEOUS
Refills: 0 | DISCHARGE

## 2023-07-25 RX ORDER — OLANZAPINE 15 MG/1
2.5 TABLET, FILM COATED ORAL AT BEDTIME
Refills: 0 | Status: DISCONTINUED | OUTPATIENT
Start: 2023-07-25 | End: 2023-07-27

## 2023-07-25 RX ORDER — FOLIC ACID 0.8 MG
1 TABLET ORAL DAILY
Refills: 0 | Status: DISCONTINUED | OUTPATIENT
Start: 2023-07-25 | End: 2023-07-27

## 2023-07-25 RX ORDER — GLUCAGON INJECTION, SOLUTION 0.5 MG/.1ML
1 INJECTION, SOLUTION SUBCUTANEOUS ONCE
Refills: 0 | Status: DISCONTINUED | OUTPATIENT
Start: 2023-07-25 | End: 2023-07-27

## 2023-07-25 RX ORDER — ATORVASTATIN CALCIUM 80 MG/1
20 TABLET, FILM COATED ORAL AT BEDTIME
Refills: 0 | Status: DISCONTINUED | OUTPATIENT
Start: 2023-07-25 | End: 2023-07-27

## 2023-07-25 RX ADMIN — SENNA PLUS 2 TABLET(S): 8.6 TABLET ORAL at 21:51

## 2023-07-25 RX ADMIN — Medication 4: at 17:09

## 2023-07-25 RX ADMIN — LEVETIRACETAM 1000 MILLIGRAM(S): 250 TABLET, FILM COATED ORAL at 21:52

## 2023-07-25 RX ADMIN — OLANZAPINE 2.5 MILLIGRAM(S): 15 TABLET, FILM COATED ORAL at 21:49

## 2023-07-25 RX ADMIN — ATORVASTATIN CALCIUM 20 MILLIGRAM(S): 80 TABLET, FILM COATED ORAL at 21:52

## 2023-07-25 RX ADMIN — INSULIN HUMAN 5 UNIT(S): 100 INJECTION, SOLUTION SUBCUTANEOUS at 22:02

## 2023-07-25 NOTE — HISTORY OF PRESENT ILLNESS
[FreeTextEntry1] : Jared is a 63 year old right handed male with PMH of HTN, DM, Anemia, Alcohol Abuse, TBI, anxiety, SAH and seizures presented for a follow up.\par Pt was last seen Oct 2021. He is a resident of [facility]. \par Pt can not provide much history due to aphasia. EMR reviewed.\par 3/2023 pt was brought to the ER withf  R sided weakness . Per EMS, patient had seizure upon arrival which self resolved. Shaking lasting 30 seconds, eyes gaze to the right, did not witness bite tongue. Pt has been seizure free  "for a while" .\par Work up was negative for stroke:\par \par CT Angio Brain Stroke Protocol  w/ IV Cont \par 1.  No evidence of acute large vessel occlusion. Normal perfusion images.\par 2.  Stable scattered atheromatous changes with up to about 50% stenosis of the proximal left ICA and moderate stenosis of bilateral carotid siphons.\par \par \par CT Brain Stroke Protocol \par 1.  No evidence of acute intracranial hemorrhage or large territory infarct.\par 2.  Stable chronic infarct in the left basal ganglia. Stable mild chronic microvascular changes.\par \par \par REEG- abnormal due to focal slowing.\par \par Keppra level in the ER <2 pt supposedly was compliant with meds. \par \par Since discharge from the hospital no reported seizures. Pt is on Keppa 1000 mgs BID, no recent levels.\par \par At baseline pt is aphasic and has residual right sided weakness.

## 2023-07-25 NOTE — H&P ADULT - NSHPPHYSICALEXAM_GEN_ALL_CORE
T(C): 36.1 (07-25-23 @ 11:11), Max: 36.1 (07-25-23 @ 11:11)  HR: 78 (07-25-23 @ 11:11) (78 - 78)  BP: 131/63 (07-25-23 @ 11:11) (131/63 - 131/63)  RR: --  SpO2: --    PHYSICAL EXAM:  GENERAL: sitting up in bed, appearing comfortable and in NAD  HEENT: Moist mucous membranes  NECK: Supple  CHEST/LUNG: even respirations b/l; no accessory muscle use  ABDOMEN: Soft, Nontender, Nondistended  EXTREMITIES:   No calf TTP b/l  SKIN: warm

## 2023-07-25 NOTE — H&P ADULT - ASSESSMENT
62 y/o M PMHx HTN, DLD, IDDM, seizure disorder, schizophrenia, TBI with residual R sided weakness, listed hx psychoactive substance abuse? and [facility]    pt denieS fever, chills, n/v, stomach pain     # seizure disorder  - admit to EMU  - Video EEG for characterization and treatment plan  - seizure precautions  -- Ativan 2mg IV PRN for generalized tonic-clonic seizure lasting longer than 2 minutes, or two consecutive seizures without return to baseline in-between   - continue AED at home dose  - Mg level, keep > 2.0  - neurology consult      HPI, PMH, and med rec information obtained from pt in addition to facility aide and facility paperwork  62 y/o M PMHx HTN, DLD,  IDDM, seizure disorder, schizophrenia, TBI with residual R sided weakness, listed hx psychoactive substance abuse? and [facility]    pt denies ever, chills, n/v, stomach pain     # seizure disorder  - admit to EMU  - Video EEG for characterization and treatment plan  - seizure precautions  -- Ativan 2mg IV PRN for generalized tonic-clonic seizure lasting longer than 2 minutes, or two consecutive seizures without return to baseline in-between   - continue AED at home dose  - Mg level, keep > 2.0  - neurology consult    #Hx of schizophrenia  -  Olanzapine 2.5 mg QHS    #HTN  - not on  PO meds  - monitor BP     #HLD  - c/w Atorvastatin 20 mg      #IDDM  - c/w: Lantus 40 units every morning  - f/u A1C in AM   - Monitor FS and start Insulin SS            DVT Ppx: SCDs for now pending weight and height entry         HPI, PMH, and med rec information obtained from pt in addition to facility aide and facility paperwork

## 2023-07-25 NOTE — PHYSICAL EXAM
[FreeTextEntry1] : General:  Appearance is consistent with chronologic age.  No abnormal facies. \par Cognitive/ Language:  The patient is oriented to person, + slurred speech , moderate aphasia\par Eyes:  PERRL.  No ptosis/weakness of eyelid closure.  \par Face:  Facial sensation normal V1 - 3, no facial asymmetry.  \par Ears/Nose/Throat:  Hearing grossly intact b/l. \par Motor examination:   Normal tone, bulk and range of motion.  No tenderness, twitching, tremors or involuntary movements.\par Formal Muscle Strength Testing: Right arm 1/5, Left arm 4/5, Right leg 0/5, Left leg 4/5 , no observable drift.\par Reflexes:   2+ b/l pectoralis, patella and Achilles. clonus absent, negative babinski sign\par Sensory examination:   Intact to light touch in all extremities.\par  \par Gait deferred. Pt in a wheelchair

## 2023-07-25 NOTE — H&P ADULT - NS ATTEND AMEND GEN_ALL_CORE FT
#elective veeg - meds per neuro recs, seizure precuations, check routine labs  #hx of SAH/TBI/schizophrenia   #DMII- monitor FS

## 2023-07-25 NOTE — CONSULT NOTE ADULT - NS ATTEND AMEND GEN_ALL_CORE FT
Agree with the Hx and the plan  He is here for further characterization and medication adjustment  As it appears at least on one occasion related to a witnessed seizure , his medication level was not good ( ? none compliance)  will start the monitoring  seizure precaution

## 2023-07-25 NOTE — CONSULT NOTE ADULT - ASSESSMENT
Assessment:  Jared is a 63 year old right handed male with PMH of HTN, DM, Anemia, Alcohol Abuse, TBI, anxiety, SAH and seizures presented for VEEG monitoring for further characterization and to optimize seizure medication.       Plan:  - VEEG monitoring for further characterization and to optimize seizure medicaitons  - Seizure precautions  - Continue pre-admission doses of seizure medications for now Keppra 1000 mgs BID  - Ativan 2mg IV PRN for generalized tonic-clonic seizure lasting longer than 2 minutes, or two consecutive seizures without return to baseline in-between  - CBC, CMP, Mg, AED levels trough  - Keep Mg above 2     Discussed with nursing team and PA.      Case discussed with Dr Marquis

## 2023-07-25 NOTE — H&P ADULT - HISTORY OF PRESENT ILLNESS
64 y/o M PMHx HTN, DLD, IDDM, seizure disorder, schizophrenia, TBI with residual R sided weakness, listed hx psychoactive substance abuse? and [facility]    pt denied fever, chills, n/v, stomach pain
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
## 2023-07-25 NOTE — DISCUSSION/SUMMARY
[Safety Recommendations] : The patient was advised in regards to the risk of seizures and general seizure safety recommendations including not to be bathing alone, climbing to high places and operating heavy machinery. [Compliance with Medications] : The importance of compliance with medications was reinforced. [Medication Side Effects] : High frequency and serious potential medication adverse effects were reviewed with the patient, including but not exclusive to psychiatric effects.  Information sheets on medication side effects were made available to the patient in our clinic.  The patient or advocate agrees to notify us for any concerns. [Bone Health Education] : Patient was educated in regards to bone health and an increased risk of osteoporosis in patients with epilepsy. [Sleep Hygiene/Sleep Disruption Risks] : Sleep hygiene and the risks of sleep disruption were discussed. [Inpatient video EEG study ordered with length of stay anticipated in days: _____] : Inpatient video EEG study ordered with length of stay anticipated in days: [unfilled] [Evaluation for interictal epileptiform activity, subclinical seizures, and risk stratification (typically 2-3 days)] : Evaluation for interictal epileptiform activity, subclinical seizures, and risk stratification (typically 2-3 days) [FreeTextEntry1] : Jared is a 63 year old right handed male with PMH of HTN, DM, Anemia, Alcohol Abuse, TBI, anxiety, SAH and seizures presented for a follow up after a breakthrough seizure 3/2023.\par \par Plan:\par - continue same keppra dose 1000 ngs BID\par - check levels as soon as possible\par - admit for VEEG for further characterization of syndrome and medication optimization\par - PT\par - follow up after VEEG.\par \par Case discussed with Dr. Marquis\par \par Nat Harris, DNP, ACNP-BC

## 2023-07-26 DIAGNOSIS — E11.9 TYPE 2 DIABETES MELLITUS WITHOUT COMPLICATIONS: ICD-10-CM

## 2023-07-26 DIAGNOSIS — F10.10 ALCOHOL ABUSE, UNCOMPLICATED: ICD-10-CM

## 2023-07-26 DIAGNOSIS — F20.9 SCHIZOPHRENIA, UNSPECIFIED: ICD-10-CM

## 2023-07-26 DIAGNOSIS — E83.42 HYPOMAGNESEMIA: ICD-10-CM

## 2023-07-26 DIAGNOSIS — E78.5 HYPERLIPIDEMIA, UNSPECIFIED: ICD-10-CM

## 2023-07-26 DIAGNOSIS — Z29.9 ENCOUNTER FOR PROPHYLACTIC MEASURES, UNSPECIFIED: ICD-10-CM

## 2023-07-26 DIAGNOSIS — R56.9 UNSPECIFIED CONVULSIONS: ICD-10-CM

## 2023-07-26 LAB
A1C WITH ESTIMATED AVERAGE GLUCOSE RESULT: 8.5 % — HIGH (ref 4–5.6)
ANION GAP SERPL CALC-SCNC: 10 MMOL/L — SIGNIFICANT CHANGE UP (ref 7–14)
BUN SERPL-MCNC: 12 MG/DL — SIGNIFICANT CHANGE UP (ref 10–20)
CALCIUM SERPL-MCNC: 8.8 MG/DL — SIGNIFICANT CHANGE UP (ref 8.4–10.5)
CHLORIDE SERPL-SCNC: 108 MMOL/L — SIGNIFICANT CHANGE UP (ref 98–110)
CO2 SERPL-SCNC: 24 MMOL/L — SIGNIFICANT CHANGE UP (ref 17–32)
CREAT SERPL-MCNC: 0.6 MG/DL — LOW (ref 0.7–1.5)
EGFR: 109 ML/MIN/1.73M2 — SIGNIFICANT CHANGE UP
ESTIMATED AVERAGE GLUCOSE: 197 MG/DL — HIGH (ref 68–114)
GLUCOSE BLDC GLUCOMTR-MCNC: 233 MG/DL — HIGH (ref 70–99)
GLUCOSE SERPL-MCNC: 228 MG/DL — HIGH (ref 70–99)
HCT VFR BLD CALC: 39.8 % — LOW (ref 42–52)
HGB BLD-MCNC: 13.3 G/DL — LOW (ref 14–18)
MCHC RBC-ENTMCNC: 28.5 PG — SIGNIFICANT CHANGE UP (ref 27–31)
MCHC RBC-ENTMCNC: 33.4 G/DL — SIGNIFICANT CHANGE UP (ref 32–37)
MCV RBC AUTO: 85.4 FL — SIGNIFICANT CHANGE UP (ref 80–94)
NRBC # BLD: 0 /100 WBCS — SIGNIFICANT CHANGE UP (ref 0–0)
PLATELET # BLD AUTO: 165 K/UL — SIGNIFICANT CHANGE UP (ref 130–400)
PMV BLD: 10.3 FL — SIGNIFICANT CHANGE UP (ref 7.4–10.4)
POTASSIUM SERPL-MCNC: 4.2 MMOL/L — SIGNIFICANT CHANGE UP (ref 3.5–5)
POTASSIUM SERPL-SCNC: 4.2 MMOL/L — SIGNIFICANT CHANGE UP (ref 3.5–5)
RBC # BLD: 4.66 M/UL — LOW (ref 4.7–6.1)
RBC # FLD: 13.1 % — SIGNIFICANT CHANGE UP (ref 11.5–14.5)
SODIUM SERPL-SCNC: 142 MMOL/L — SIGNIFICANT CHANGE UP (ref 135–146)
WBC # BLD: 5.4 K/UL — SIGNIFICANT CHANGE UP (ref 4.8–10.8)
WBC # FLD AUTO: 5.4 K/UL — SIGNIFICANT CHANGE UP (ref 4.8–10.8)

## 2023-07-26 PROCEDURE — 99233 SBSQ HOSP IP/OBS HIGH 50: CPT

## 2023-07-26 PROCEDURE — 36573 INSJ PICC RS&I 5 YR+: CPT

## 2023-07-26 RX ORDER — LEVETIRACETAM 250 MG/1
250 TABLET, FILM COATED ORAL
Refills: 0 | Status: DISCONTINUED | OUTPATIENT
Start: 2023-07-26 | End: 2023-07-26

## 2023-07-26 RX ORDER — LEVETIRACETAM 250 MG/1
1250 TABLET, FILM COATED ORAL
Refills: 0 | Status: DISCONTINUED | OUTPATIENT
Start: 2023-07-26 | End: 2023-07-27

## 2023-07-26 RX ORDER — MAGNESIUM SULFATE 500 MG/ML
1 VIAL (ML) INJECTION ONCE
Refills: 0 | Status: COMPLETED | OUTPATIENT
Start: 2023-07-26 | End: 2023-07-26

## 2023-07-26 RX ADMIN — Medication 100 MILLIGRAM(S): at 12:15

## 2023-07-26 RX ADMIN — INSULIN GLARGINE 40 UNIT(S): 100 INJECTION, SOLUTION SUBCUTANEOUS at 07:57

## 2023-07-26 RX ADMIN — ATORVASTATIN CALCIUM 20 MILLIGRAM(S): 80 TABLET, FILM COATED ORAL at 21:23

## 2023-07-26 RX ADMIN — LEVETIRACETAM 1250 MILLIGRAM(S): 250 TABLET, FILM COATED ORAL at 21:23

## 2023-07-26 RX ADMIN — Medication 1 MILLIGRAM(S): at 12:15

## 2023-07-26 RX ADMIN — OLANZAPINE 2.5 MILLIGRAM(S): 15 TABLET, FILM COATED ORAL at 21:22

## 2023-07-26 RX ADMIN — Medication 1 TABLET(S): at 12:15

## 2023-07-26 RX ADMIN — Medication 2: at 16:48

## 2023-07-26 RX ADMIN — LEVETIRACETAM 1000 MILLIGRAM(S): 250 TABLET, FILM COATED ORAL at 10:08

## 2023-07-26 RX ADMIN — Medication 100 GRAM(S): at 15:45

## 2023-07-26 RX ADMIN — Medication 4: at 12:15

## 2023-07-26 RX ADMIN — SENNA PLUS 2 TABLET(S): 8.6 TABLET ORAL at 21:23

## 2023-07-26 RX ADMIN — Medication 4: at 07:57

## 2023-07-26 NOTE — EEG REPORT - NS EEG TEXT BOX
Epilepsy Attending Note:     FABIOLA PORTILLO    63y Male  MRN [id number]    Vital Signs Last 24 Hrs  T(C): 36.7 (26 Jul 2023 13:59), Max: 36.7 (26 Jul 2023 13:59)  T(F): 98.1 (26 Jul 2023 13:59), Max: 98.1 (26 Jul 2023 13:59)  HR: 95 (26 Jul 2023 13:59) (71 - 95)  BP: 151/95 (26 Jul 2023 13:59) (138/87 - 151/95)  BP(mean): --  RR: 16 (26 Jul 2023 13:59) (16 - 18)  SpO2: --                              13.3   5.40  )-----------( 165      ( 26 Jul 2023 07:50 )             39.8       07-26    142  |  108  |  12  ----------------------------<  228<H>  4.2   |  24  |  0.6<L>    Ca    8.8      26 Jul 2023 07:50  Mg     1.7     07-25        MEDICATIONS  (STANDING):  atorvastatin 20 milliGRAM(s) Oral at bedtime  dextrose 5%. 1000 milliLiter(s) (50 mL/Hr) IV Continuous <Continuous>  dextrose 5%. 1000 milliLiter(s) (100 mL/Hr) IV Continuous <Continuous>  dextrose 50% Injectable 12.5 Gram(s) IV Push once  dextrose 50% Injectable 25 Gram(s) IV Push once  dextrose 50% Injectable 25 Gram(s) IV Push once  folic acid 1 milliGRAM(s) Oral daily  glucagon  Injectable 1 milliGRAM(s) IntraMuscular once  insulin glargine Injectable (LANTUS) 40 Unit(s) SubCutaneous every morning  insulin lispro (ADMELOG) corrective regimen sliding scale   SubCutaneous three times a day before meals  levETIRAcetam 250 milliGRAM(s) Oral <User Schedule>  magnesium sulfate  IVPB 1 Gram(s) IV Intermittent once  multivitamin/minerals 1 Tablet(s) Oral daily  OLANZapine 2.5 milliGRAM(s) Oral at bedtime  senna 2 Tablet(s) Oral at bedtime  thiamine 100 milliGRAM(s) Oral daily    MEDICATIONS  (PRN):  acetaminophen     Tablet .. 650 milliGRAM(s) Oral every 6 hours PRN Temp greater or equal to 38C (100.4F), Mild Pain (1 - 3)  dextrose Oral Gel 15 Gram(s) Oral once PRN Blood Glucose LESS THAN 70 milliGRAM(s)/deciliter  LORazepam   Injectable 2 milliGRAM(s) IV Push once PRN Seizure Activity  melatonin 3 milliGRAM(s) Oral at bedtime PRN Insomnia  ondansetron Injectable 4 milliGRAM(s) IV Push every 8 hours PRN Nausea and/or Vomiting            VEEG in the last 24 hours:    Background---continues, asymmetrical with better expression of the PDR from the right side and higher amplitude on the left reaching 8-9 hz    Focal and generalized slowing-------   moderate  left FT slowing    Interictal activity--- ---- large number of left FT sharp  waves and spikes    Events-----see bellow    Seizures-----------around 11 pm last night , he did have a cluster of two left hemispheric focal seizure in the course of 3 minutes                          The events lasted each for 60 and 30 seconds. Clinically he was seen having hand automatism, aphasia slight axial movements    Impression:       abnormal as above    Plan -  will continue the monitoring. seizure precaution. increase the Keppra to 1250 bid as of tonight

## 2023-07-26 NOTE — PROGRESS NOTE ADULT - NSPROGADDITIONALINFOA_GEN_ALL_CORE
MDM High (2 of 3 elements met)    A. Number & Complexity of Problems Addressed  -Patient is have acute neurologic disorder- active seizure not controlled requiring continued inpatient monitoring and med optimization    C. Risk of Complications and/or MM of Patient Management  - In my opinion, the patient is at high risk of uncontrolled seizure as he is breaking through on previous dosing of keppra. He requires continued close monitoring and EEG for med optimization.

## 2023-07-26 NOTE — PROGRESS NOTE ADULT - SUBJECTIVE AND OBJECTIVE BOX
CC: 63M p/w seizure      SUBJECTIVE / OVERNIGHT EVENTS:  No acute events overnight. Patient seen and evaluated at bedside. No fever/chills.  Denies SOB at rest, chest pain, palpitations, abdominal pain, nausea/vomiting    ROS:  CONSTITUTIONAL: No fever, no chills  RESPIRATORY: No cough, No sob  CARDIOVASCULAR: No CP, no palpitations  GASTROINTESTINAL: no abdominal pain, no n/v/d  GENITOURINARY: No dysuria, no hematuria    MEDICATIONS  (STANDING):  atorvastatin 20 milliGRAM(s) Oral at bedtime  dextrose 5%. 1000 milliLiter(s) (50 mL/Hr) IV Continuous <Continuous>  dextrose 5%. 1000 milliLiter(s) (100 mL/Hr) IV Continuous <Continuous>  dextrose 50% Injectable 25 Gram(s) IV Push once  dextrose 50% Injectable 12.5 Gram(s) IV Push once  dextrose 50% Injectable 25 Gram(s) IV Push once  folic acid 1 milliGRAM(s) Oral daily  glucagon  Injectable 1 milliGRAM(s) IntraMuscular once  insulin glargine Injectable (LANTUS) 40 Unit(s) SubCutaneous every morning  insulin lispro (ADMELOG) corrective regimen sliding scale   SubCutaneous three times a day before meals  levETIRAcetam 1250 milliGRAM(s) Oral <User Schedule>  multivitamin/minerals 1 Tablet(s) Oral daily  OLANZapine 2.5 milliGRAM(s) Oral at bedtime  senna 2 Tablet(s) Oral at bedtime  thiamine 100 milliGRAM(s) Oral daily    MEDICATIONS  (PRN):  acetaminophen     Tablet .. 650 milliGRAM(s) Oral every 6 hours PRN Temp greater or equal to 38C (100.4F), Mild Pain (1 - 3)  dextrose Oral Gel 15 Gram(s) Oral once PRN Blood Glucose LESS THAN 70 milliGRAM(s)/deciliter  LORazepam   Injectable 2 milliGRAM(s) IV Push once PRN Seizure Activity  melatonin 3 milliGRAM(s) Oral at bedtime PRN Insomnia  ondansetron Injectable 4 milliGRAM(s) IV Push every 8 hours PRN Nausea and/or Vomiting      CAPILLARY BLOOD GLUCOSE      POCT Blood Glucose.: 242 mg/dL (26 Jul 2023 12:07)  POCT Blood Glucose.: 233 mg/dL (26 Jul 2023 07:29)  POCT Blood Glucose.: 248 mg/dL (25 Jul 2023 21:11)  POCT Blood Glucose.: 237 mg/dL (25 Jul 2023 16:38)    I&O's Summary      Physical Exam  Vital Signs Last 24 Hrs  T(C): 36.7 (26 Jul 2023 13:59), Max: 36.7 (26 Jul 2023 13:59)  T(F): 98.1 (26 Jul 2023 13:59), Max: 98.1 (26 Jul 2023 13:59)  HR: 95 (26 Jul 2023 13:59) (71 - 95)  BP: 151/95 (26 Jul 2023 13:59) (138/87 - 151/95)  RR: 16 (26 Jul 2023 13:59) (16 - 18) on RA        GENERAL: NAD, completing EEG  HEAD:  Atraumatic, Normocephalic  EYES: EOMI, PERRL, conjunctiva and sclera clear  ENMT: MMM, no angular cheilitis appreciated  NECK: Supple, trachea midline  Lung: normal work of breathing, cta b/l  Cardiovascular: S1&S2+, rrr, no m/r/g appreciated  ABDOMEN: bs+, soft, nt, nd  : No navarrete catheter, no suprapubic pain to palpation  Neuro: Alert & follows commands, no flaccid paralysis in extremities appreciated  SKIN: warm and dry, no visible purulence in exposed areas    LABS:                        13.3   5.40  )-----------( 165      ( 26 Jul 2023 07:50 )             39.8     07-26    142  |  108  |  12  ----------------------------<  228<H>  4.2   |  24  |  0.6<L>    Ca    8.8      26 Jul 2023 07:50  Mg     1.7     07-25            Urinalysis Basic - ( 26 Jul 2023 07:50 )    Color: x / Appearance: x / SG: x / pH: x  Gluc: 228 mg/dL / Ketone: x  / Bili: x / Urobili: x   Blood: x / Protein: x / Nitrite: x   Leuk Esterase: x / RBC: x / WBC x   Sq Epi: x / Non Sq Epi: x / Bacteria: x          RADIOLOGY & ADDITIONAL TESTS:  Results Reviewed:   Imaging Personally Reviewed:  Electrocardiogram Personally Reviewed:    COORDINATION OF CARE:  Care Discussed with Consultants/Other Providers [Y/N]:  Prior or Outpatient Records Reviewed [Y/N]:   Olanzapine Pregnancy And Lactation Text: This medication is pregnancy category C.   There are no adequate and well controlled trials with olanzapine in pregnant females.  Olanzapine should be used during pregnancy only if the potential benefit justifies the potential risk to the fetus.   In a study in lactating healthy women, olanzapine was excreted in breast milk.  It is recommended that women taking olanzapine should not breast feed.

## 2023-07-27 ENCOUNTER — TRANSCRIPTION ENCOUNTER (OUTPATIENT)
Age: 63
End: 2023-07-27

## 2023-07-27 VITALS — WEIGHT: 181.66 LBS

## 2023-07-27 LAB
ALBUMIN SERPL ELPH-MCNC: 4.3 G/DL — SIGNIFICANT CHANGE UP (ref 3.5–5.2)
ALP SERPL-CCNC: 84 U/L — SIGNIFICANT CHANGE UP (ref 30–115)
ALT FLD-CCNC: 62 U/L — HIGH (ref 0–41)
ANION GAP SERPL CALC-SCNC: 11 MMOL/L — SIGNIFICANT CHANGE UP (ref 7–14)
AST SERPL-CCNC: 32 U/L — SIGNIFICANT CHANGE UP (ref 0–41)
BASOPHILS # BLD AUTO: 0.04 K/UL — SIGNIFICANT CHANGE UP (ref 0–0.2)
BASOPHILS NFR BLD AUTO: 0.6 % — SIGNIFICANT CHANGE UP (ref 0–1)
BILIRUB SERPL-MCNC: 0.4 MG/DL — SIGNIFICANT CHANGE UP (ref 0.2–1.2)
BUN SERPL-MCNC: 13 MG/DL — SIGNIFICANT CHANGE UP (ref 10–20)
CALCIUM SERPL-MCNC: 9 MG/DL — SIGNIFICANT CHANGE UP (ref 8.4–10.5)
CHLORIDE SERPL-SCNC: 103 MMOL/L — SIGNIFICANT CHANGE UP (ref 98–110)
CO2 SERPL-SCNC: 24 MMOL/L — SIGNIFICANT CHANGE UP (ref 17–32)
CREAT SERPL-MCNC: 0.6 MG/DL — LOW (ref 0.7–1.5)
EGFR: 109 ML/MIN/1.73M2 — SIGNIFICANT CHANGE UP
EOSINOPHIL # BLD AUTO: 0.21 K/UL — SIGNIFICANT CHANGE UP (ref 0–0.7)
EOSINOPHIL NFR BLD AUTO: 3.3 % — SIGNIFICANT CHANGE UP (ref 0–8)
GLUCOSE SERPL-MCNC: 230 MG/DL — HIGH (ref 70–99)
HCT VFR BLD CALC: 42.3 % — SIGNIFICANT CHANGE UP (ref 42–52)
HGB BLD-MCNC: 14 G/DL — SIGNIFICANT CHANGE UP (ref 14–18)
IMM GRANULOCYTES NFR BLD AUTO: 0.3 % — SIGNIFICANT CHANGE UP (ref 0.1–0.3)
LYMPHOCYTES # BLD AUTO: 2.45 K/UL — SIGNIFICANT CHANGE UP (ref 1.2–3.4)
LYMPHOCYTES # BLD AUTO: 38.6 % — SIGNIFICANT CHANGE UP (ref 20.5–51.1)
MAGNESIUM SERPL-MCNC: 1.9 MG/DL — SIGNIFICANT CHANGE UP (ref 1.8–2.4)
MCHC RBC-ENTMCNC: 28.3 PG — SIGNIFICANT CHANGE UP (ref 27–31)
MCHC RBC-ENTMCNC: 33.1 G/DL — SIGNIFICANT CHANGE UP (ref 32–37)
MCV RBC AUTO: 85.6 FL — SIGNIFICANT CHANGE UP (ref 80–94)
MONOCYTES # BLD AUTO: 0.61 K/UL — HIGH (ref 0.1–0.6)
MONOCYTES NFR BLD AUTO: 9.6 % — HIGH (ref 1.7–9.3)
NEUTROPHILS # BLD AUTO: 3.02 K/UL — SIGNIFICANT CHANGE UP (ref 1.4–6.5)
NEUTROPHILS NFR BLD AUTO: 47.6 % — SIGNIFICANT CHANGE UP (ref 42.2–75.2)
NRBC # BLD: 0 /100 WBCS — SIGNIFICANT CHANGE UP (ref 0–0)
PHOSPHATE SERPL-MCNC: 2.9 MG/DL — SIGNIFICANT CHANGE UP (ref 2.1–4.9)
PLATELET # BLD AUTO: 166 K/UL — SIGNIFICANT CHANGE UP (ref 130–400)
PMV BLD: 10.7 FL — HIGH (ref 7.4–10.4)
POTASSIUM SERPL-MCNC: 4.7 MMOL/L — SIGNIFICANT CHANGE UP (ref 3.5–5)
POTASSIUM SERPL-SCNC: 4.7 MMOL/L — SIGNIFICANT CHANGE UP (ref 3.5–5)
PROT SERPL-MCNC: 7.1 G/DL — SIGNIFICANT CHANGE UP (ref 6–8)
RBC # BLD: 4.94 M/UL — SIGNIFICANT CHANGE UP (ref 4.7–6.1)
RBC # FLD: 13.3 % — SIGNIFICANT CHANGE UP (ref 11.5–14.5)
SODIUM SERPL-SCNC: 138 MMOL/L — SIGNIFICANT CHANGE UP (ref 135–146)
WBC # BLD: 6.35 K/UL — SIGNIFICANT CHANGE UP (ref 4.8–10.8)
WBC # FLD AUTO: 6.35 K/UL — SIGNIFICANT CHANGE UP (ref 4.8–10.8)

## 2023-07-27 PROCEDURE — 99231 SBSQ HOSP IP/OBS SF/LOW 25: CPT

## 2023-07-27 PROCEDURE — 95720 EEG PHY/QHP EA INCR W/VEEG: CPT

## 2023-07-27 PROCEDURE — 99239 HOSP IP/OBS DSCHRG MGMT >30: CPT

## 2023-07-27 RX ORDER — LEVETIRACETAM 250 MG/1
250 TABLET, FILM COATED ORAL
Qty: 60 | Refills: 3 | Status: ACTIVE | COMMUNITY
Start: 2023-07-27 | End: 1900-01-01

## 2023-07-27 RX ORDER — MAGNESIUM OXIDE 400 MG ORAL TABLET 241.3 MG
400 TABLET ORAL ONCE
Refills: 0 | Status: COMPLETED | OUTPATIENT
Start: 2023-07-27 | End: 2023-07-27

## 2023-07-27 RX ORDER — LEVETIRACETAM 250 MG/1
5 TABLET, FILM COATED ORAL
Qty: 0 | Refills: 0 | DISCHARGE
Start: 2023-07-27

## 2023-07-27 RX ORDER — HYDRALAZINE HCL 50 MG
10 TABLET ORAL ONCE
Refills: 0 | Status: DISCONTINUED | OUTPATIENT
Start: 2023-07-27 | End: 2023-07-27

## 2023-07-27 RX ADMIN — MAGNESIUM OXIDE 400 MG ORAL TABLET 400 MILLIGRAM(S): 241.3 TABLET ORAL at 13:12

## 2023-07-27 RX ADMIN — INSULIN GLARGINE 40 UNIT(S): 100 INJECTION, SOLUTION SUBCUTANEOUS at 09:10

## 2023-07-27 RX ADMIN — Medication 100 MILLIGRAM(S): at 12:18

## 2023-07-27 RX ADMIN — Medication 1 TABLET(S): at 12:18

## 2023-07-27 RX ADMIN — Medication 6: at 12:18

## 2023-07-27 RX ADMIN — Medication 1 MILLIGRAM(S): at 12:18

## 2023-07-27 RX ADMIN — Medication 4: at 08:18

## 2023-07-27 RX ADMIN — LEVETIRACETAM 1250 MILLIGRAM(S): 250 TABLET, FILM COATED ORAL at 10:41

## 2023-07-27 NOTE — PROGRESS NOTE ADULT - PROBLEM SELECTOR PLAN 7
DVT ppx: SCD  GOC: Full Code  Dispo: plan for discharge per Neurology
DVT ppx: SCD  GOC: Full Code  Dispo: active- requires further med optimization for seizure

## 2023-07-27 NOTE — DISCHARGE NOTE PROVIDER - NSDCFUSCHEDAPPT_GEN_ALL_CORE_FT
Surjit Lou  [facility] PreAdmits  Scheduled Appointment: 09/07/2023    [facility] Physician [facility]  ENDOCRIN [address]  Scheduled Appointment: 09/07/2023    Harpreet Marquis  [facility]  NEUROLOGY [address] [facility]  Scheduled Appointment: 10/25/2023

## 2023-07-27 NOTE — DISCHARGE NOTE PROVIDER - HOSPITAL COURSE
63M w/ Schizophrenia, Seizure Disorder, Hx of TBI c/b Right-sided weakness, Hx of SAH, EtOH dependence, DM2 on insulin, HTN admitted for evaluation for seizure and Neuro monitoring and med optimization. Per neurology, the patient was identified to have a seizure on EEG which resolved with increasing Keppra to increase to 1250mg twice daily. Following medication optimization, the patient was determined to have resolution of seizure.    The neurology team confirmed that Keppra prescription with new dosing was prescribed to the patient's pharmacy (via their office EMR).    At time of discharge, the patient was medically optimized and hemodynamically stable for disposition back to long term care facility.

## 2023-07-27 NOTE — PROGRESS NOTE ADULT - ASSESSMENT
63M w/ Schizophrenia, Seizure Disorder, Hx of TBI c/b Right-sided weakness, Hx of SAH, EtOH dependence, DM2 on insulin, HTN admitted for evaluation for seizure and Neuro monitoring and med optimization. Per neurology, seizures have now resolved and patient can be discharged home.
63M w/ Schizophrenia, Seizure Disorder, Hx of TBI c/b Right-sided weakness, Hx of SAH, EtOH dependence, DM2 on insulin, HTN admitted for evaluation for seizure currently undergoing Neuro monitoring and med optimization. Per neurology, Seizure identified on EEG last night.

## 2023-07-27 NOTE — EEG REPORT - NS EEG TEXT BOX
Epilepsy Attending Note:     FABIOLA PORTILLO    63y Male  MRN [id number]    Vital Signs Last 24 Hrs  T(C): 35.7 (27 Jul 2023 04:46), Max: 36.7 (26 Jul 2023 13:59)  T(F): 96.2 (27 Jul 2023 04:46), Max: 98.1 (26 Jul 2023 13:59)  HR: 68 (27 Jul 2023 04:46) (64 - 95)  BP: 143/92 (27 Jul 2023 04:46) (134/74 - 151/95)  BP(mean): --  RR: 16 (26 Jul 2023 21:30) (16 - 16)  SpO2: --                              14.0   6.35  )-----------( 166      ( 27 Jul 2023 11:00 )             42.3       07-26    142  |  108  |  12  ----------------------------<  228<H>  4.2   |  24  |  0.6<L>    Ca    8.8      26 Jul 2023 07:50  Mg     1.7     07-25        MEDICATIONS  (STANDING):  atorvastatin 20 milliGRAM(s) Oral at bedtime  dextrose 5%. 1000 milliLiter(s) (100 mL/Hr) IV Continuous <Continuous>  dextrose 5%. 1000 milliLiter(s) (50 mL/Hr) IV Continuous <Continuous>  dextrose 50% Injectable 25 Gram(s) IV Push once  dextrose 50% Injectable 12.5 Gram(s) IV Push once  dextrose 50% Injectable 25 Gram(s) IV Push once  folic acid 1 milliGRAM(s) Oral daily  glucagon  Injectable 1 milliGRAM(s) IntraMuscular once  insulin glargine Injectable (LANTUS) 40 Unit(s) SubCutaneous every morning  insulin lispro (ADMELOG) corrective regimen sliding scale   SubCutaneous three times a day before meals  levETIRAcetam 1250 milliGRAM(s) Oral <User Schedule>  multivitamin/minerals 1 Tablet(s) Oral daily  OLANZapine 2.5 milliGRAM(s) Oral at bedtime  senna 2 Tablet(s) Oral at bedtime  thiamine 100 milliGRAM(s) Oral daily    MEDICATIONS  (PRN):  acetaminophen     Tablet .. 650 milliGRAM(s) Oral every 6 hours PRN Temp greater or equal to 38C (100.4F), Mild Pain (1 - 3)  dextrose Oral Gel 15 Gram(s) Oral once PRN Blood Glucose LESS THAN 70 milliGRAM(s)/deciliter  LORazepam   Injectable 2 milliGRAM(s) IV Push once PRN Seizure Activity  melatonin 3 milliGRAM(s) Oral at bedtime PRN Insomnia  ondansetron Injectable 4 milliGRAM(s) IV Push every 8 hours PRN Nausea and/or Vomiting            VEEG in the last 24 hours:    Background-----continues, asymmetrical with higher amplitude and breach artifact from the left side reaching a PDR of 7-8    Focal and generalized slowing----------- 1- mild generalized slowing 2- moderate left hemispheric focal slowing    Interictal activity--------left FT sharp waves     Events----none    Seizures---none    Impression: abnormal as above    Plan - DC on Keppra 1250 bid,level and F/U

## 2023-07-27 NOTE — DISCHARGE NOTE NURSING/CASE MANAGEMENT/SOCIAL WORK - PATIENT PORTAL LINK FT
You can access the FollowMyHealth Patient Portal offered by [facility] by registering at the following website: [email or web address]. By joining [facility]’s FollowMyHealth portal, you will also be able to view your health information using other applications (apps) compatible with our system.

## 2023-07-27 NOTE — DISCHARGE NOTE PROVIDER - NSDCMRMEDTOKEN_GEN_ALL_CORE_FT
atorvastatin 20 mg oral tablet: 1 tab(s) orally once a day  folic acid 1 mg oral tablet: 1 tab(s) orally once a day  levETIRAcetam 250 mg oral tablet: 5 tab(s) orally 2 times a day  Multiple Vitamins with Minerals oral tablet: 1 tab(s) orally once a day  OLANZapine 2.5 mg oral tablet: 1 tab(s) orally once a day (at bedtime)  senna oral tablet: 2 tab(s) orally once a day (at bedtime)  thiamine 100 mg oral tablet: 1 tab(s) orally once a day  Tresiba 100 units/mL subcutaneous solution: 40 international unit(s) subcutaneous once a day before breakfast

## 2023-07-27 NOTE — DISCHARGE NOTE NURSING/CASE MANAGEMENT/SOCIAL WORK - NSDCPEFALRISK_GEN_ALL_CORE
For information on Fall & Injury Prevention, visit: [email or web address] OR  [email or web address] OR  https://www.cdc.gov/steadi/patient.html

## 2023-07-27 NOTE — PROGRESS NOTE ADULT - NSPROGADDITIONALINFOA_GEN_ALL_CORE
MDM Moderate (2 of 3 elements met)    A. Number & Complexity of Problems Addressed  - 2 or more stable chronic illnesses- seizure disorder, schizophrenia    C. Risk of Complications and/or MM of Patient management  - The patient actively is requires prescription drug management  - In my medical opinion the diagnosis or treatment of this patient's condition is significantly limited by social determinants of health    Patient required 35 minutes for discharge examination and coordination with Neurology, RN team, and CM.

## 2023-07-27 NOTE — DISCHARGE NOTE NURSING/CASE MANAGEMENT/SOCIAL WORK - NSDCVIVACCINE_GEN_ALL_CORE_FT
Tdap; 06-Jul-2020 06:10; Matilde Spann (LEXIE); [employer]; [id number] (Exp. Date: 04-Apr-2022); IntraMuscular; Deltoid Left.; 0.5 milliLiter(s); VIS (VIS Published: 09-May-2013, VIS Presented: 06-Jul-2020);

## 2023-07-27 NOTE — PROGRESS NOTE ADULT - PROBLEM SELECTOR PLAN 1
per Neurology- patient can be discharged  - c/w keppra (dose increased during this hospitalization)
per Neurology seizure overnight  - c/w EEG, neuro reports requires another 24 hour obs for med optimization  - c/w keppra, ativan prn

## 2023-07-27 NOTE — PROGRESS NOTE ADULT - SUBJECTIVE AND OBJECTIVE BOX
Epilepsy Team Discharge Note:     VEEG monitoring completed, patient is cleared for discharge from neurology standpoint.     Follow up neurology appointment is scheduled with Dr. Marquis for October 25, 2023 at 11 am.     [address] [place] [address], [address]  [phone]     Discharge seizure medications are:  Keppra 1250 mgs BID(dose increased)    Rx sent to the pharmacy.     Patient was also given Rx for CBC, CMP, AED levels trough to be done prior to follow up.     The importance of regular sleep pattern and adherence to prescribed regimen reinforced.       Discussed with medical and nursing teams.    Epilepsy Team Discharge Note:     VEEG monitoring completed, patient is cleared for discharge from neurology standpoint.     Follow up neurology appointment is scheduled with Dr. Marquis for October 25, 2023 at 11 am.     [address] [place] [address], [address]  [phone]     Discharge seizure medications are:  Keppra 1250 mgs BID(dose increased)    Rx sent to the pharmacy.     Patient was also given Rx for CBC, CMP, AED levels trough to be done prior to follow up.     The importance of regular sleep pattern and adherence to prescribed regimen reinforced.   Spoke with nurse Coleman at the Rehab facility where pt resides. Explained plan of care and changes that were made during this admission.    Discussed with medical and nursing teams.

## 2023-07-27 NOTE — PROGRESS NOTE ADULT - SUBJECTIVE AND OBJECTIVE BOX
CC: 63M w/ seizure      SUBJECTIVE / OVERNIGHT EVENTS:  No acute events overnight. Patient seen and evaluated at bedside. No reported complaints from the patient- no seizure, paralysis, headache, vision change    ROS:  CONSTITUTIONAL: No fever, no chills  RESPIRATORY: No cough, No sob  CARDIOVASCULAR: No CP, no palpitations  GASTROINTESTINAL: no abdominal pain, no n/v/d    MEDICATIONS  (STANDING):  atorvastatin 20 milliGRAM(s) Oral at bedtime  dextrose 5%. 1000 milliLiter(s) (100 mL/Hr) IV Continuous <Continuous>  dextrose 5%. 1000 milliLiter(s) (50 mL/Hr) IV Continuous <Continuous>  dextrose 50% Injectable 25 Gram(s) IV Push once  dextrose 50% Injectable 12.5 Gram(s) IV Push once  dextrose 50% Injectable 25 Gram(s) IV Push once  folic acid 1 milliGRAM(s) Oral daily  glucagon  Injectable 1 milliGRAM(s) IntraMuscular once  insulin glargine Injectable (LANTUS) 40 Unit(s) SubCutaneous every morning  insulin lispro (ADMELOG) corrective regimen sliding scale   SubCutaneous three times a day before meals  levETIRAcetam 1250 milliGRAM(s) Oral <User Schedule>  multivitamin/minerals 1 Tablet(s) Oral daily  OLANZapine 2.5 milliGRAM(s) Oral at bedtime  senna 2 Tablet(s) Oral at bedtime  thiamine 100 milliGRAM(s) Oral daily    MEDICATIONS  (PRN):  acetaminophen     Tablet .. 650 milliGRAM(s) Oral every 6 hours PRN Temp greater or equal to 38C (100.4F), Mild Pain (1 - 3)  dextrose Oral Gel 15 Gram(s) Oral once PRN Blood Glucose LESS THAN 70 milliGRAM(s)/deciliter  LORazepam   Injectable 2 milliGRAM(s) IV Push once PRN Seizure Activity  melatonin 3 milliGRAM(s) Oral at bedtime PRN Insomnia  ondansetron Injectable 4 milliGRAM(s) IV Push every 8 hours PRN Nausea and/or Vomiting      CAPILLARY BLOOD GLUCOSE      POCT Blood Glucose.: 259 mg/dL (27 Jul 2023 11:35)  POCT Blood Glucose.: 204 mg/dL (27 Jul 2023 07:51)  POCT Blood Glucose.: 214 mg/dL (26 Jul 2023 21:57)  POCT Blood Glucose.: 190 mg/dL (26 Jul 2023 16:33)    I&O's Summary    Vital Signs Last 24 Hrs  T(C): 35.7 (27 Jul 2023 04:46), Max: 36.7 (26 Jul 2023 13:59)  T(F): 96.2 (27 Jul 2023 04:46), Max: 98.1 (26 Jul 2023 13:59)  HR: 68 (27 Jul 2023 04:46) (64 - 95)  BP: 143/92 (27 Jul 2023 04:46) (134/74 - 151/95)  RR: 16 (26 Jul 2023 21:30) (16 - 16) on RA    GENERAL: NAD, well-developed  HEAD:  Atraumatic, Normocephalic  EYES: EOMI, PERRL, conjunctiva and sclera clear  ENMT: MMM, no angular cheilitis appreciated  NECK: Supple, trachea midline  Lung: normal work of breathing, cta b/l  Cardiovascular: S1&S2+, rrr, no m/r/g appreciated  ABDOMEN: bs+, soft, nt, nd  : No navarrete catheter, no suprapubic pain to palpation  Neuro: Alert & follows commands, no flaccid paralysis in extremities appreciated  SKIN: warm and dry, no visible purulence in exposed areas    LABS:                        14.0   6.35  )-----------( 166      ( 27 Jul 2023 11:00 )             42.3     07-26    142  |  108  |  12  ----------------------------<  228<H>  4.2   |  24  |  0.6<L>    Ca    8.8      26 Jul 2023 07:50  Mg     1.7     07-25            Urinalysis Basic - ( 26 Jul 2023 07:50 )    Color: x / Appearance: x / SG: x / pH: x  Gluc: 228 mg/dL / Ketone: x  / Bili: x / Urobili: x   Blood: x / Protein: x / Nitrite: x   Leuk Esterase: x / RBC: x / WBC x   Sq Epi: x / Non Sq Epi: x / Bacteria: x          RADIOLOGY & ADDITIONAL TESTS:  Results Reviewed:   Imaging Personally Reviewed:  Electrocardiogram Personally Reviewed:    COORDINATION OF CARE:  Care Discussed with Consultants/Other Providers [Y]: Neurology- patient did not have seizure in last 24hr and can now be discharged  Prior or Outpatient Records Reviewed [Y/N]:

## 2023-07-27 NOTE — DISCHARGE NOTE PROVIDER - NSDCCPCAREPLAN_GEN_ALL_CORE_FT
PRINCIPAL DISCHARGE DIAGNOSIS  Diagnosis: Seizures  Assessment and Plan of Treatment: The Neurology team has sent new prescription for Keppra. It is important to continue close follow up with your neurologist as an outpatient.

## 2023-07-28 LAB — LEVETIRACETAM SERPL-MCNC: 2.7 UG/ML — LOW (ref 10–40)

## 2023-08-01 DIAGNOSIS — E83.42 HYPOMAGNESEMIA: ICD-10-CM

## 2023-08-01 DIAGNOSIS — I10 ESSENTIAL (PRIMARY) HYPERTENSION: ICD-10-CM

## 2023-08-01 DIAGNOSIS — G40.909 EPILEPSY, UNSPECIFIED, NOT INTRACTABLE, WITHOUT STATUS EPILEPTICUS: ICD-10-CM

## 2023-08-01 DIAGNOSIS — E78.5 HYPERLIPIDEMIA, UNSPECIFIED: ICD-10-CM

## 2023-08-01 DIAGNOSIS — Z87.820 PERSONAL HISTORY OF TRAUMATIC BRAIN INJURY: ICD-10-CM

## 2023-08-01 DIAGNOSIS — F20.9 SCHIZOPHRENIA, UNSPECIFIED: ICD-10-CM

## 2023-08-01 DIAGNOSIS — E11.9 TYPE 2 DIABETES MELLITUS WITHOUT COMPLICATIONS: ICD-10-CM

## 2023-08-01 DIAGNOSIS — F10.10 ALCOHOL ABUSE, UNCOMPLICATED: ICD-10-CM

## 2023-08-01 DIAGNOSIS — Z79.4 LONG TERM (CURRENT) USE OF INSULIN: ICD-10-CM

## 2023-09-07 ENCOUNTER — OUTPATIENT (OUTPATIENT)
Dept: OUTPATIENT SERVICES | Facility: HOSPITAL | Age: 63
LOS: 1 days | End: 2023-09-07

## 2023-09-07 ENCOUNTER — APPOINTMENT (OUTPATIENT)
Dept: ENDOCRINOLOGY | Facility: CLINIC | Age: 63
End: 2023-09-07

## 2023-09-07 VITALS
WEIGHT: 193 LBS | SYSTOLIC BLOOD PRESSURE: 121 MMHG | HEART RATE: 88 BPM | BODY MASS INDEX: 32.95 KG/M2 | HEIGHT: 64 IN | DIASTOLIC BLOOD PRESSURE: 80 MMHG

## 2023-09-07 DIAGNOSIS — E66.9 OBESITY, UNSPECIFIED: ICD-10-CM

## 2023-09-07 DIAGNOSIS — E78.5 HYPERLIPIDEMIA, UNSPECIFIED: ICD-10-CM

## 2023-09-07 DIAGNOSIS — Z00.00 ENCOUNTER FOR GENERAL ADULT MEDICAL EXAMINATION WITHOUT ABNORMAL FINDINGS: ICD-10-CM

## 2023-09-07 DIAGNOSIS — E10.65 TYPE 1 DIABETES MELLITUS WITH HYPERGLYCEMIA: ICD-10-CM

## 2023-09-07 DIAGNOSIS — E11.65 TYPE 2 DIABETES MELLITUS WITH HYPERGLYCEMIA: ICD-10-CM

## 2023-09-07 PROCEDURE — 99214 OFFICE O/P EST MOD 30 MIN: CPT

## 2023-09-07 NOTE — PHYSICAL EXAM
[Alert] : alert [No Respiratory Distress] : no respiratory distress [Normal Sclera/Conjunctiva] : normal sclera/conjunctiva [Clear to Auscultation] : lungs were clear to auscultation bilaterally [Normal S1, S2] : normal S1 and S2 [Regular Rhythm] : with a regular rhythm [Not Tender] : non-tender [Soft] : abdomen soft [Oriented x3] : oriented to person, place, and time

## 2023-09-07 NOTE — HISTORY OF PRESENT ILLNESS
[FreeTextEntry1] : 63 year old male who present for follow up of DM type 2. His fingersticks are uncontrolled at the nursing home. Currently on Tresiba 40 units with sliding scale.

## 2023-09-07 NOTE — ASSESSMENT
[FreeTextEntry1] : 63 year old patient who present for follow up of diabetes:  # DM type 2  - was on on basaglar 35 units and Lispro 5 tidac  - Switched last time to Tresiba 40 units and sliding scale then given Jardiance with pioglitazone  - uncontrolled fingersticks at nursing home  - refer to ophthalmology and podiatry again  - Stop Novolog  - start pioglitazone 30 mg daily  - start Ozempic 0.25 weekly for 2 weeks, if no nausea after 2 weeks, continue as 0.5 weekly, if no nausea after 1 month, continue as 1 mg weekly  - if fingerstick less than 50 decrease Tresiba dosage, plan updated for the nursing home

## 2023-09-07 NOTE — REVIEW OF SYSTEMS
[Fatigue] : no fatigue [Decreased Appetite] : appetite not decreased [Chest Pain] : no chest pain [Lower Ext Edema] : no lower extremity edema [Shortness Of Breath] : no shortness of breath [Cough] : no cough [Nausea] : no nausea [Vomiting] : no vomiting

## 2023-09-11 DIAGNOSIS — E66.9 OBESITY, UNSPECIFIED: ICD-10-CM

## 2023-09-11 DIAGNOSIS — E78.5 HYPERLIPIDEMIA, UNSPECIFIED: ICD-10-CM

## 2023-09-11 DIAGNOSIS — E11.65 TYPE 2 DIABETES MELLITUS WITH HYPERGLYCEMIA: ICD-10-CM

## 2023-10-25 ENCOUNTER — APPOINTMENT (OUTPATIENT)
Dept: NEUROLOGY | Facility: CLINIC | Age: 63
End: 2023-10-25
Payer: MEDICARE

## 2023-10-25 VITALS
WEIGHT: 203.44 LBS | OXYGEN SATURATION: 98 % | HEIGHT: 64 IN | TEMPERATURE: 96.3 F | SYSTOLIC BLOOD PRESSURE: 120 MMHG | BODY MASS INDEX: 34.73 KG/M2 | HEART RATE: 77 BPM | DIASTOLIC BLOOD PRESSURE: 67 MMHG

## 2023-10-25 PROCEDURE — 99212 OFFICE O/P EST SF 10 MIN: CPT

## 2023-10-25 RX ORDER — ENALAPRIL MALEATE 5 MG/1
5 TABLET ORAL DAILY
Refills: 0 | Status: DISCONTINUED | COMMUNITY
End: 2023-10-25

## 2023-10-25 RX ORDER — LEVETIRACETAM 1000 MG/1
1000 TABLET, FILM COATED ORAL
Qty: 60 | Refills: 3 | Status: DISCONTINUED | COMMUNITY
Start: 2023-07-27 | End: 2023-10-25

## 2023-10-25 RX ORDER — PIOGLITAZONE HYDROCHLORIDE 30 MG/1
30 TABLET ORAL
Qty: 30 | Refills: 5 | Status: DISCONTINUED | COMMUNITY
Start: 2023-05-05 | End: 2023-10-25

## 2023-10-25 RX ORDER — PIOGLITAZONE HYDROCHLORIDE 30 MG/1
30 TABLET ORAL
Qty: 90 | Refills: 3 | Status: DISCONTINUED | COMMUNITY
Start: 2023-09-07 | End: 2023-10-25

## 2023-10-25 RX ORDER — INSULIN DEGLUDEC INJECTION 200 U/ML
200 INJECTION, SOLUTION SUBCUTANEOUS
Refills: 0 | Status: ACTIVE | COMMUNITY

## 2023-10-25 RX ORDER — PIOGLITAZONE HYDROCHLORIDE 15 MG/1
15 TABLET ORAL
Refills: 0 | Status: ACTIVE | COMMUNITY

## 2023-10-25 RX ORDER — OLANZAPINE 2.5 MG/1
2.5 TABLET, FILM COATED ORAL
Refills: 0 | Status: ACTIVE | COMMUNITY

## 2023-10-25 RX ORDER — RISPERIDONE 0.5 MG/1
0.5 TABLET ORAL TWICE DAILY
Refills: 0 | Status: DISCONTINUED | COMMUNITY
End: 2023-10-25

## 2023-10-25 RX ORDER — EMPAGLIFLOZIN 25 MG/1
25 TABLET, FILM COATED ORAL DAILY
Qty: 30 | Refills: 5 | Status: DISCONTINUED | COMMUNITY
Start: 2023-05-05 | End: 2023-10-25

## 2023-10-25 RX ORDER — ATORVASTATIN CALCIUM 40 MG/1
40 TABLET, FILM COATED ORAL DAILY
Qty: 30 | Refills: 5 | Status: DISCONTINUED | COMMUNITY
End: 2023-10-25

## 2023-12-20 ENCOUNTER — APPOINTMENT (OUTPATIENT)
Dept: PEDIATRIC NEUROLOGY | Facility: CLINIC | Age: 63
End: 2023-12-20

## 2024-03-04 NOTE — ED PROCEDURE NOTE - PROCEDURE NAME, MLM
ONGOING DISCHARGE PLAN:    Covid +, room air, afebrile.     Patient is alert and oriented x4.    Spoke with patient regarding discharge plan and patient confirms that plan is still to return home with friend, independent, denies needs.    DME: Glucometer and supplies.    VNS: Denies need.    Nephrology consulted-DKA, received dialysis for bicarb bath.     PT/OT on board; independent.     Will continue to follow for additional discharge needs.    If patient is discharged prior to next notation, then this note serves as note for discharge by case management.    Electronically signed by Yeni Begum RN on 3/4/2024 at 12:27 PM     Laceration Repair

## 2024-05-05 ENCOUNTER — NON-APPOINTMENT (OUTPATIENT)
Age: 64
End: 2024-05-05

## 2024-05-06 ENCOUNTER — APPOINTMENT (OUTPATIENT)
Dept: NEUROLOGY | Facility: CLINIC | Age: 64
End: 2024-05-06
Payer: MEDICARE

## 2024-05-06 VITALS
TEMPERATURE: 97.6 F | BODY MASS INDEX: 35 KG/M2 | WEIGHT: 205 LBS | SYSTOLIC BLOOD PRESSURE: 128 MMHG | HEART RATE: 89 BPM | OXYGEN SATURATION: 98 % | DIASTOLIC BLOOD PRESSURE: 81 MMHG | HEIGHT: 64 IN

## 2024-05-06 DIAGNOSIS — G40.109 LOCALIZATION-RELATED (FOCAL) (PARTIAL) SYMPTOMATIC EPILEPSY AND EPILEPTIC SYNDROMES WITH SIMPLE PARTIAL SEIZURES, NOT INTRACTABLE, W/OUT STATUS EPILEPTICUS: ICD-10-CM

## 2024-05-06 PROCEDURE — 99213 OFFICE O/P EST LOW 20 MIN: CPT

## 2024-05-07 NOTE — PHYSICAL EXAM
[FreeTextEntry1] : General:  Appearance is consistent with chronologic age.  No abnormal facies.  Cognitive/ Language:  The patient is oriented to person, + slurred speech , moderate aphasia Eyes:  PERRL.  No ptosis/weakness of eyelid closure.   Face:  Facial sensation normal V1 - 3, no facial asymmetry.   Ears/Nose/Throat:  Hearing grossly intact b/l.  Motor examination:   Normal tone, bulk and range of motion.  No tenderness, twitching, tremors or involuntary movements. Formal Muscle Strength Testing: Right arm 1/5, Left arm 4/5, Right leg 0/5, Left leg 4/5 , no observable drift. Reflexes:   2+ b/l pectoralis, patella and Achilles. clonus absent, negative babinski sign Sensory examination:   Intact to light touch in all extremities.   Gait deferred. Pt in a wheelchair able to walk with assistance in the nursing home.

## 2024-05-07 NOTE — HISTORY OF PRESENT ILLNESS
[FreeTextEntry1] : Jared is a 64 year old right handed male with PMH of HTN, DM, Anemia, Alcohol Abuse, TBI, anxiety, SAH and seizures presented for a follow up. Pt is still living at [facility]. No reported seizures. Blood work not provided. Called the facility and was told that Keppra level was checked March 2024 level of 40(peak). No reported seizures by pt of rehab.   Keppra is 1250 mgs BID   VEEG 7/2023 48 hours   Background---continues, asymmetrical with better expression of the PDR from the right side and higher amplitude on the left reaching 8-9 hz   Focal and generalized slowing-------   moderate  left FT slowing   Interictal activity--- ---- large number of left FT sharp  waves and spikes   Seizures-----------around 11 pm last night , he did have a cluster of two left hemispheric focal seizure in the course of 3 minutes. The events lasted each for 60 and 30 seconds. Clinically he was seen having hand automatism, aphasia slight axial movements   At baseline pt is aphasic and has residual right sided weakness. Arrived in a wheelchair.

## 2024-05-07 NOTE — DISCUSSION/SUMMARY
[Safety Recommendations] : The patient was advised in regards to the risk of seizures and general seizure safety recommendations including not to be bathing alone, climbing to high places and operating heavy machinery. [Compliance with Medications] : The importance of compliance with medications was reinforced. [Medication Side Effects] : High frequency and serious potential medication adverse effects were reviewed with the patient, including but not exclusive to psychiatric effects.  Information sheets on medication side effects were made available to the patient in our clinic.  The patient or advocate agrees to notify us for any concerns. [Bone Health Education] : Patient was educated in regards to bone health and an increased risk of osteoporosis in patients with epilepsy. [Sleep Hygiene/Sleep Disruption Risks] : Sleep hygiene and the risks of sleep disruption were discussed. [FreeTextEntry1] : Jared is a 64 year old right handed male with PMH of HTN, DM, Anemia, Alcohol Abuse, TBI, anxiety, SAH and seizures presented for a follow up.  Plan: - continue same keppra dose 1250 mgs BID - check levels- Rx given, explained to rehab nurse blood work has to be done in the morning before taking meds - PT, increase activity - follow up in 6 months - INA  Case discussed with Dr. Benitez Harris, DNP, ACNP-BC

## 2024-07-15 NOTE — ED ADULT NURSE NOTE - CAS TRG GENERAL NORM CIRC DET
Please follow-up with dermatology for further care, if symptoms worsen please return to the emergency department    Use the cream twice a day for the next 7 days, keep the area clean and dry.  Use the antibiotics as directed, if symptoms worsen please return to the emergency department  
Strong peripheral pulses

## 2024-09-13 NOTE — ED ADULT TRIAGE NOTE - LAST KNOWN WELL DATE/TIME
POD:4. Patient is A&Ox4. RA.  Provena intact. General diet. Saline locked. Vibramycin given overnight. Aspirin & scds for dvt prophylaxis. Voiding via purewick. Ice gel therapy. ACHS. Scheduled tramadol. PRN Oxycodone for pain management. Up by lift. Call light within reach, frequent rounding. Safety Measures in place. Plan for [place] when medically clear.     Problem: Patient Centered Care  Goal: Patient preferences are identified and integrated in the patient's plan of care  Description: Interventions:  - What would you like us to know as we care for you  - Provide timely, complete, and accurate information to patient/family  - Incorporate patient and family knowledge, values, beliefs, and cultural backgrounds into the planning and delivery of care  - Encourage patient/family to participate in care and decision-making at the level they choose  - Honor patient and family perspectives and choices  Outcome: Progressing     Problem: Patient/Family Goals  Goal: Patient/Family Long Term Goal  Description: Patient's Long Term Goal: to be able to walk with out pain    Interventions:  - surgery, physical therapy  - See additional Care Plan goals for specific interventions  Outcome: Progressing  Goal: Patient/Family Short Term Goal  Description: Patient's Short Term Goal: to be able to go to rehab    Interventions:   - case management  - See additional Care Plan goals for specific interventions  Outcome: Progressing     Problem: PAIN - ADULT  Goal: Verbalizes/displays adequate comfort level or patient's stated pain goal  Description: INTERVENTIONS:  - Encourage pt to monitor pain and request assistance  - Assess pain using appropriate pain scale  - Administer analgesics based on type and severity of pain and evaluate response  - Implement non-pharmacological measures as appropriate and evaluate response  - Consider cultural and social influences on pain and pain management  - Manage/alleviate anxiety  - Utilize  distraction and/or relaxation techniques  - Monitor for opioid side effects  - Notify MD/LIP if interventions unsuccessful or patient reports new pain  - Anticipate increased pain with activity and pre-medicate as appropriate  Outcome: Progressing     Problem: RISK FOR INFECTION - ADULT  Goal: Absence of fever/infection during anticipated neutropenic period  Description: INTERVENTIONS  - Monitor WBC  - Administer growth factors as ordered  - Implement neutropenic guidelines  Outcome: Progressing     Problem: SAFETY ADULT - FALL  Goal: Free from fall injury  Description: INTERVENTIONS:  - Assess pt frequently for physical needs  - Identify cognitive and physical deficits and behaviors that affect risk of falls.  - [place] fall precautions as indicated by assessment.  - Educate pt/family on patient safety including physical limitations  - Instruct pt to call for assistance with activity based on assessment  - Modify environment to reduce risk of injury  - Provide assistive devices as appropriate  - Consider OT/PT consult to assist with strengthening/mobility  - Encourage toileting schedule  Outcome: Progressing     Problem: DISCHARGE PLANNING  Goal: Discharge to home or other facility with appropriate resources  Description: INTERVENTIONS:  - Identify barriers to discharge w/pt and caregiver  - Include patient/family/discharge partner in discharge planning  - Arrange for needed discharge resources and transportation as appropriate  - Identify discharge learning needs (meds, wound care, etc)  - Arrange for interpreters to assist at discharge as needed  - Consider post-discharge preferences of patient/family/discharge partner  - Complete POLST form as appropriate  - Assess patient's ability to be responsible for managing their own health  - Refer to Case Management Department for coordinating discharge planning if the patient needs post-hospital services based on physician/LIP order or complex needs related to  functional status, cognitive ability or social support system  Outcome: Progressing      27-Feb-2023 07:00

## 2024-09-18 NOTE — DISCHARGE NOTE PROVIDER - NSD[facility]_MEDRECSTATUS_GEN_ALL_CORE
Ice elevate rest.  You may take Tylenol or Motrin for pain if needed.      Keep a log of your blood pressure reading for the next 2 weeks and present this to the primary care physician provided to you.  Take medication as prescribed.  Need close monitoring of your blood pressure readings.  Return to the ER with concerning or worsening symptoms   Admission Reconciliation is Completed  Discharge Reconciliation is Not Complete Admission Reconciliation is Completed  Discharge Reconciliation is Completed

## 2024-10-21 NOTE — OCCUPATIONAL THERAPY INITIAL EVALUATION ADULT - LEVEL OF INDEPENDENCE: DRESS LOWER BODY, OT EVAL
IR was consulted for vertebral augmentation of T3-T5 prior to XRT.   Patient was seen bedside. Initially, patient kept requesting no exam and was not willing to participate in the discussion. Son was bedside at time of conversation.   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   Please reach out to IR when patient is medically stable for vertebral augmentation. dependent (less than 25% patients effort)

## 2024-12-02 ENCOUNTER — APPOINTMENT (OUTPATIENT)
Dept: NEUROLOGY | Facility: CLINIC | Age: 64
End: 2024-12-02

## 2024-12-04 NOTE — CONSULT NOTE ADULT - REASON FOR ADMISSION
Goal Outcome Evaluation:      Patient resting in bed. VSS. Plan of care ongoing.                                       seizure, r/o CVA

## 2025-01-08 NOTE — ED PROVIDER NOTE - PMH
January 8, 2025      [facility]  [address] [place] [id number]  Phone: [phone]  Fax: [phone]       Patient: Christopher Davis   YOB: 2006  Date of Visit: 01/08/2025    To Whom It May Concern:    Shireen Davis  was at [facility] on 01/08/2025. The patient may return to work/school on 01/09/2025. If you have any questions or concerns, or if I can be of further assistance, please do not hesitate to contact me.    Sincerely,    FLIP Fraser     
Seizure